# Patient Record
Sex: FEMALE | Race: WHITE | Employment: FULL TIME | URBAN - NONMETROPOLITAN AREA
[De-identification: names, ages, dates, MRNs, and addresses within clinical notes are randomized per-mention and may not be internally consistent; named-entity substitution may affect disease eponyms.]

---

## 2017-02-08 DIAGNOSIS — E11.69 TYPE 2 DIABETES MELLITUS WITH OTHER SPECIFIED COMPLICATION (HCC): ICD-10-CM

## 2017-02-08 LAB
ALBUMIN SERPL-MCNC: 3.6 G/DL (ref 3.5–5.2)
ALP BLD-CCNC: 55 U/L (ref 35–104)
ALT SERPL-CCNC: 11 U/L (ref 5–33)
ANION GAP SERPL CALCULATED.3IONS-SCNC: 14 MMOL/L (ref 7–19)
AST SERPL-CCNC: 14 U/L (ref 5–32)
BILIRUB SERPL-MCNC: <0.2 MG/DL (ref 0.2–1.2)
BUN BLDV-MCNC: 9 MG/DL (ref 6–20)
CALCIUM SERPL-MCNC: 8.9 MG/DL (ref 8.6–10)
CHLORIDE BLD-SCNC: 101 MMOL/L (ref 98–111)
CHOLESTEROL, TOTAL: 118 MG/DL (ref 160–199)
CO2: 26 MMOL/L (ref 22–29)
CREAT SERPL-MCNC: 0.7 MG/DL (ref 0.5–0.9)
GFR NON-AFRICAN AMERICAN: >60
GLOBULIN: 3.2 G/DL
GLUCOSE BLD-MCNC: 111 MG/DL (ref 74–109)
HBA1C MFR BLD: 5.9 %
HCT VFR BLD CALC: 39.9 % (ref 37–47)
HDLC SERPL-MCNC: 45 MG/DL (ref 65–121)
HEMOGLOBIN: 12.6 G/DL (ref 12–16)
LDL CHOLESTEROL CALCULATED: 58 MG/DL
MCH RBC QN AUTO: 26.6 PG (ref 27–31)
MCHC RBC AUTO-ENTMCNC: 31.6 G/DL (ref 33–37)
MCV RBC AUTO: 84.2 FL (ref 81–99)
MICROALBUMIN UR-MCNC: <1.2 MG/DL (ref 0–19)
PDW BLD-RTO: 13.1 % (ref 11.5–14.5)
PLATELET # BLD: 324 K/UL (ref 130–400)
PMV BLD AUTO: 9.9 FL (ref 7.4–10.4)
POTASSIUM SERPL-SCNC: 4.4 MMOL/L (ref 3.5–5)
RBC # BLD: 4.74 M/UL (ref 4.2–5.4)
SODIUM BLD-SCNC: 141 MMOL/L (ref 136–145)
TOTAL PROTEIN: 6.8 G/DL (ref 6.6–8.7)
TRIGL SERPL-MCNC: 77 MG/DL (ref 150–199)
WBC # BLD: 6.7 K/UL (ref 4.8–10.8)

## 2017-02-21 ENCOUNTER — OFFICE VISIT (OUTPATIENT)
Dept: FAMILY MEDICINE CLINIC | Age: 52
End: 2017-02-21
Payer: COMMERCIAL

## 2017-02-21 VITALS
TEMPERATURE: 98.9 F | BODY MASS INDEX: 33.86 KG/M2 | WEIGHT: 184 LBS | HEART RATE: 78 BPM | SYSTOLIC BLOOD PRESSURE: 126 MMHG | RESPIRATION RATE: 16 BRPM | HEIGHT: 62 IN | DIASTOLIC BLOOD PRESSURE: 72 MMHG | OXYGEN SATURATION: 98 %

## 2017-02-21 DIAGNOSIS — E66.9 OBESITY (BMI 30.0-34.9): ICD-10-CM

## 2017-02-21 DIAGNOSIS — E11.69 TYPE 2 DIABETES MELLITUS WITH OTHER SPECIFIED COMPLICATION (HCC): Primary | ICD-10-CM

## 2017-02-21 DIAGNOSIS — Z12.11 COLON CANCER SCREENING: ICD-10-CM

## 2017-02-21 PROCEDURE — 99214 OFFICE O/P EST MOD 30 MIN: CPT | Performed by: FAMILY MEDICINE

## 2017-02-21 RX ORDER — PHENTERMINE HYDROCHLORIDE 37.5 MG/1
37.5 TABLET ORAL
Qty: 30 TABLET | Refills: 0 | Status: SHIPPED | OUTPATIENT
Start: 2017-02-21 | End: 2017-03-20 | Stop reason: SDUPTHER

## 2017-02-21 ASSESSMENT — ENCOUNTER SYMPTOMS
RESPIRATORY NEGATIVE: 1
ALLERGIC/IMMUNOLOGIC NEGATIVE: 1
EYES NEGATIVE: 1
GASTROINTESTINAL NEGATIVE: 1

## 2017-03-06 ENCOUNTER — OFFICE VISIT (OUTPATIENT)
Dept: URGENT CARE | Age: 52
End: 2017-03-06
Payer: COMMERCIAL

## 2017-03-06 VITALS
TEMPERATURE: 98.9 F | RESPIRATION RATE: 20 BRPM | OXYGEN SATURATION: 97 % | DIASTOLIC BLOOD PRESSURE: 76 MMHG | WEIGHT: 176 LBS | SYSTOLIC BLOOD PRESSURE: 115 MMHG | BODY MASS INDEX: 32.39 KG/M2 | HEIGHT: 62 IN | HEART RATE: 72 BPM

## 2017-03-06 DIAGNOSIS — J04.0 LARYNGITIS: ICD-10-CM

## 2017-03-06 DIAGNOSIS — J06.9 VIRAL UPPER RESPIRATORY TRACT INFECTION: Primary | ICD-10-CM

## 2017-03-06 DIAGNOSIS — J02.9 SORE THROAT: ICD-10-CM

## 2017-03-06 LAB — S PYO AG THROAT QL: NORMAL

## 2017-03-06 PROCEDURE — 99213 OFFICE O/P EST LOW 20 MIN: CPT | Performed by: NURSE PRACTITIONER

## 2017-03-06 PROCEDURE — 96372 THER/PROPH/DIAG INJ SC/IM: CPT | Performed by: NURSE PRACTITIONER

## 2017-03-06 PROCEDURE — 87880 STREP A ASSAY W/OPTIC: CPT | Performed by: NURSE PRACTITIONER

## 2017-03-06 RX ORDER — DEXAMETHASONE SODIUM PHOSPHATE 100 MG/10ML
10 INJECTION INTRAMUSCULAR; INTRAVENOUS ONCE
Status: COMPLETED | OUTPATIENT
Start: 2017-03-06 | End: 2017-03-06

## 2017-03-06 RX ORDER — DEXTROMETHORPHAN HYDROBROMIDE AND PROMETHAZINE HYDROCHLORIDE 15; 6.25 MG/5ML; MG/5ML
SYRUP ORAL
Qty: 120 ML | Refills: 0 | Status: SHIPPED | OUTPATIENT
Start: 2017-03-06 | End: 2017-03-13

## 2017-03-06 RX ORDER — BENZONATATE 100 MG/1
100 CAPSULE ORAL 3 TIMES DAILY PRN
Qty: 21 CAPSULE | Refills: 0 | Status: SHIPPED | OUTPATIENT
Start: 2017-03-06 | End: 2017-03-13

## 2017-03-06 RX ADMIN — DEXAMETHASONE SODIUM PHOSPHATE 10 MG: 100 INJECTION INTRAMUSCULAR; INTRAVENOUS at 11:58

## 2017-03-06 ASSESSMENT — ENCOUNTER SYMPTOMS
COUGH: 1
VOICE CHANGE: 1
SINUS PRESSURE: 1
SORE THROAT: 1
GASTROINTESTINAL NEGATIVE: 1

## 2017-03-10 DIAGNOSIS — Z12.11 COLON CANCER SCREENING: ICD-10-CM

## 2017-03-10 DIAGNOSIS — R19.5 POSITIVE FIT (FECAL IMMUNOCHEMICAL TEST): Primary | ICD-10-CM

## 2017-03-10 LAB
CONTROL: NORMAL
HEMOCCULT STL QL: ABNORMAL

## 2017-03-10 PROCEDURE — 82274 ASSAY TEST FOR BLOOD FECAL: CPT | Performed by: FAMILY MEDICINE

## 2017-03-20 ENCOUNTER — OFFICE VISIT (OUTPATIENT)
Dept: FAMILY MEDICINE CLINIC | Age: 52
End: 2017-03-20
Payer: COMMERCIAL

## 2017-03-20 VITALS
HEIGHT: 62 IN | DIASTOLIC BLOOD PRESSURE: 82 MMHG | SYSTOLIC BLOOD PRESSURE: 122 MMHG | BODY MASS INDEX: 32.39 KG/M2 | TEMPERATURE: 98 F | OXYGEN SATURATION: 97 % | HEART RATE: 72 BPM | WEIGHT: 176 LBS | RESPIRATION RATE: 16 BRPM

## 2017-03-20 DIAGNOSIS — E66.9 OBESITY (BMI 30.0-34.9): Primary | ICD-10-CM

## 2017-03-20 PROCEDURE — 99213 OFFICE O/P EST LOW 20 MIN: CPT | Performed by: FAMILY MEDICINE

## 2017-03-20 RX ORDER — PHENTERMINE HYDROCHLORIDE 37.5 MG/1
37.5 TABLET ORAL
Qty: 30 TABLET | Refills: 0 | Status: SHIPPED | OUTPATIENT
Start: 2017-03-20 | End: 2017-04-21 | Stop reason: SDUPTHER

## 2017-03-20 ASSESSMENT — ENCOUNTER SYMPTOMS
EYES NEGATIVE: 1
RESPIRATORY NEGATIVE: 1
ALLERGIC/IMMUNOLOGIC NEGATIVE: 1
GASTROINTESTINAL NEGATIVE: 1

## 2017-04-04 ENCOUNTER — OFFICE VISIT (OUTPATIENT)
Dept: GASTROENTEROLOGY | Age: 52
End: 2017-04-04
Payer: COMMERCIAL

## 2017-04-04 VITALS
SYSTOLIC BLOOD PRESSURE: 120 MMHG | BODY MASS INDEX: 32.17 KG/M2 | WEIGHT: 174.8 LBS | DIASTOLIC BLOOD PRESSURE: 84 MMHG | HEART RATE: 114 BPM | HEIGHT: 62 IN | OXYGEN SATURATION: 98 %

## 2017-04-04 DIAGNOSIS — Z83.71 FAMILY HISTORY OF POLYPS IN THE COLON: ICD-10-CM

## 2017-04-04 DIAGNOSIS — R19.5 HEME POSITIVE STOOL: Primary | ICD-10-CM

## 2017-04-04 PROBLEM — Z83.719 FAMILY HISTORY OF POLYPS IN THE COLON: Status: ACTIVE | Noted: 2017-04-04

## 2017-04-04 PROCEDURE — 99214 OFFICE O/P EST MOD 30 MIN: CPT | Performed by: NURSE PRACTITIONER

## 2017-04-04 ASSESSMENT — ENCOUNTER SYMPTOMS
ABDOMINAL DISTENTION: 0
NAUSEA: 0
PHOTOPHOBIA: 0
BACK PAIN: 0
COUGH: 0
CONSTIPATION: 0
SORE THROAT: 0
WHEEZING: 0
ANAL BLEEDING: 0
VOMITING: 0
DIARRHEA: 0
CHOKING: 0
RECTAL PAIN: 0
BLOOD IN STOOL: 0

## 2017-04-10 ENCOUNTER — ANESTHESIA EVENT (OUTPATIENT)
Dept: OPERATING ROOM | Age: 52
End: 2017-04-10

## 2017-04-17 ENCOUNTER — HOSPITAL ENCOUNTER (OUTPATIENT)
Age: 52
Setting detail: SPECIMEN
Discharge: HOME | End: 2017-04-17
Payer: COMMERCIAL

## 2017-04-17 ENCOUNTER — HOSPITAL ENCOUNTER (OUTPATIENT)
Age: 52
Setting detail: OUTPATIENT SURGERY
Discharge: HOME | End: 2017-04-17
Attending: INTERNAL MEDICINE | Admitting: INTERNAL MEDICINE
Payer: COMMERCIAL

## 2017-04-17 ENCOUNTER — ANESTHESIA (OUTPATIENT)
Dept: OPERATING ROOM | Age: 52
End: 2017-04-17
Payer: COMMERCIAL

## 2017-04-17 VITALS
TEMPERATURE: 98.1 F | SYSTOLIC BLOOD PRESSURE: 120 MMHG | WEIGHT: 172 LBS | DIASTOLIC BLOOD PRESSURE: 74 MMHG | RESPIRATION RATE: 18 BRPM | HEART RATE: 74 BPM | HEIGHT: 62 IN | BODY MASS INDEX: 31.65 KG/M2 | OXYGEN SATURATION: 96 %

## 2017-04-17 VITALS — DIASTOLIC BLOOD PRESSURE: 78 MMHG | SYSTOLIC BLOOD PRESSURE: 131 MMHG | OXYGEN SATURATION: 99 %

## 2017-04-17 PROCEDURE — 45380 COLONOSCOPY AND BIOPSY: CPT | Performed by: INTERNAL MEDICINE

## 2017-04-17 PROCEDURE — 00810 PR ANESTH,INTESTINE,SCOPE,LOW: CPT | Performed by: NURSE ANESTHETIST, CERTIFIED REGISTERED

## 2017-04-17 PROCEDURE — G8907 PT DOC NO EVENTS ON DISCHARG: HCPCS

## 2017-04-17 PROCEDURE — G8918 PT W/O PREOP ORDER IV AB PRO: HCPCS

## 2017-04-17 PROCEDURE — 88305 TISSUE EXAM BY PATHOLOGIST: CPT

## 2017-04-17 PROCEDURE — 45380 COLONOSCOPY AND BIOPSY: CPT

## 2017-04-17 RX ORDER — HYDRALAZINE HYDROCHLORIDE 20 MG/ML
5 INJECTION INTRAMUSCULAR; INTRAVENOUS EVERY 10 MIN PRN
Status: DISCONTINUED | OUTPATIENT
Start: 2017-04-17 | End: 2017-04-17 | Stop reason: HOSPADM

## 2017-04-17 RX ORDER — SODIUM CHLORIDE 9 MG/ML
INJECTION, SOLUTION INTRAVENOUS CONTINUOUS
Status: DISCONTINUED | OUTPATIENT
Start: 2017-04-17 | End: 2017-04-17 | Stop reason: HOSPADM

## 2017-04-17 RX ORDER — PROPOFOL 10 MG/ML
INJECTION, EMULSION INTRAVENOUS PRN
Status: DISCONTINUED | OUTPATIENT
Start: 2017-04-17 | End: 2017-04-17 | Stop reason: SDUPTHER

## 2017-04-17 RX ORDER — PROMETHAZINE HYDROCHLORIDE 25 MG/ML
12.5 INJECTION, SOLUTION INTRAMUSCULAR; INTRAVENOUS
Status: DISCONTINUED | OUTPATIENT
Start: 2017-04-17 | End: 2017-04-17 | Stop reason: HOSPADM

## 2017-04-17 RX ORDER — MEPERIDINE HYDROCHLORIDE 25 MG/ML
12.5 INJECTION INTRAMUSCULAR; INTRAVENOUS; SUBCUTANEOUS EVERY 5 MIN PRN
Status: DISCONTINUED | OUTPATIENT
Start: 2017-04-17 | End: 2017-04-17 | Stop reason: HOSPADM

## 2017-04-17 RX ORDER — ONDANSETRON 2 MG/ML
4 INJECTION INTRAMUSCULAR; INTRAVENOUS
Status: DISCONTINUED | OUTPATIENT
Start: 2017-04-17 | End: 2017-04-17 | Stop reason: HOSPADM

## 2017-04-17 RX ORDER — DIPHENHYDRAMINE HYDROCHLORIDE 50 MG/ML
12.5 INJECTION INTRAMUSCULAR; INTRAVENOUS
Status: DISCONTINUED | OUTPATIENT
Start: 2017-04-17 | End: 2017-04-17 | Stop reason: HOSPADM

## 2017-04-17 RX ADMIN — PROPOFOL 200 MG: 10 INJECTION, EMULSION INTRAVENOUS at 11:29

## 2017-04-17 RX ADMIN — SODIUM CHLORIDE: 9 INJECTION, SOLUTION INTRAVENOUS at 10:49

## 2017-04-21 ENCOUNTER — OFFICE VISIT (OUTPATIENT)
Dept: FAMILY MEDICINE CLINIC | Age: 52
End: 2017-04-21
Payer: COMMERCIAL

## 2017-04-21 VITALS
BODY MASS INDEX: 31.09 KG/M2 | TEMPERATURE: 98 F | DIASTOLIC BLOOD PRESSURE: 82 MMHG | HEART RATE: 88 BPM | SYSTOLIC BLOOD PRESSURE: 122 MMHG | OXYGEN SATURATION: 98 % | WEIGHT: 170 LBS

## 2017-04-21 DIAGNOSIS — E66.9 OBESITY (BMI 30.0-34.9): ICD-10-CM

## 2017-04-21 PROCEDURE — 99213 OFFICE O/P EST LOW 20 MIN: CPT | Performed by: FAMILY MEDICINE

## 2017-04-21 RX ORDER — PHENTERMINE HYDROCHLORIDE 37.5 MG/1
37.5 TABLET ORAL
Qty: 30 TABLET | Refills: 0 | Status: SHIPPED | OUTPATIENT
Start: 2017-04-21 | End: 2017-05-31 | Stop reason: SDUPTHER

## 2017-04-21 ASSESSMENT — ENCOUNTER SYMPTOMS
RESPIRATORY NEGATIVE: 1
NAUSEA: 0
ALLERGIC/IMMUNOLOGIC NEGATIVE: 1
GASTROINTESTINAL NEGATIVE: 1
VOMITING: 0
EYES NEGATIVE: 1

## 2017-05-31 ENCOUNTER — OFFICE VISIT (OUTPATIENT)
Dept: FAMILY MEDICINE CLINIC | Age: 52
End: 2017-05-31
Payer: COMMERCIAL

## 2017-05-31 VITALS
WEIGHT: 162 LBS | DIASTOLIC BLOOD PRESSURE: 78 MMHG | OXYGEN SATURATION: 98 % | BODY MASS INDEX: 29.63 KG/M2 | SYSTOLIC BLOOD PRESSURE: 118 MMHG | TEMPERATURE: 98.5 F | HEART RATE: 77 BPM

## 2017-05-31 DIAGNOSIS — E66.9 OBESITY (BMI 30.0-34.9): ICD-10-CM

## 2017-05-31 PROCEDURE — 99213 OFFICE O/P EST LOW 20 MIN: CPT | Performed by: FAMILY MEDICINE

## 2017-05-31 RX ORDER — PHENTERMINE HYDROCHLORIDE 37.5 MG/1
37.5 TABLET ORAL
Qty: 30 TABLET | Refills: 0 | Status: SHIPPED | OUTPATIENT
Start: 2017-05-31 | End: 2017-06-30 | Stop reason: SDUPTHER

## 2017-05-31 ASSESSMENT — ENCOUNTER SYMPTOMS
EYES NEGATIVE: 1
RESPIRATORY NEGATIVE: 1
GASTROINTESTINAL NEGATIVE: 1
VOMITING: 0
NAUSEA: 0
ALLERGIC/IMMUNOLOGIC NEGATIVE: 1

## 2017-06-30 ENCOUNTER — OFFICE VISIT (OUTPATIENT)
Dept: FAMILY MEDICINE CLINIC | Age: 52
End: 2017-06-30
Payer: COMMERCIAL

## 2017-06-30 VITALS
SYSTOLIC BLOOD PRESSURE: 122 MMHG | TEMPERATURE: 97 F | HEART RATE: 76 BPM | WEIGHT: 158 LBS | DIASTOLIC BLOOD PRESSURE: 80 MMHG | BODY MASS INDEX: 28.9 KG/M2 | OXYGEN SATURATION: 93 %

## 2017-06-30 DIAGNOSIS — E66.09 OBESITY DUE TO EXCESS CALORIES, UNSPECIFIED OBESITY SEVERITY: Primary | ICD-10-CM

## 2017-06-30 PROCEDURE — 99213 OFFICE O/P EST LOW 20 MIN: CPT | Performed by: FAMILY MEDICINE

## 2017-06-30 RX ORDER — PHENTERMINE HYDROCHLORIDE 37.5 MG/1
37.5 TABLET ORAL
Qty: 30 TABLET | Refills: 0 | Status: SHIPPED | OUTPATIENT
Start: 2017-06-30 | End: 2017-07-30

## 2017-06-30 ASSESSMENT — ENCOUNTER SYMPTOMS
ABDOMINAL PAIN: 0
NAUSEA: 0
SHORTNESS OF BREATH: 0
ALLERGIC/IMMUNOLOGIC NEGATIVE: 1
RESPIRATORY NEGATIVE: 1
GASTROINTESTINAL NEGATIVE: 1
VOMITING: 0
EYES NEGATIVE: 1

## 2017-10-23 DIAGNOSIS — E78.5 HYPERLIPIDEMIA, UNSPECIFIED HYPERLIPIDEMIA TYPE: Primary | ICD-10-CM

## 2017-10-23 DIAGNOSIS — I10 ESSENTIAL HYPERTENSION: ICD-10-CM

## 2017-10-23 DIAGNOSIS — E03.9 HYPOTHYROIDISM, UNSPECIFIED TYPE: ICD-10-CM

## 2017-10-23 DIAGNOSIS — E11.69 TYPE 2 DIABETES MELLITUS WITH OTHER SPECIFIED COMPLICATION, WITHOUT LONG-TERM CURRENT USE OF INSULIN (HCC): ICD-10-CM

## 2017-10-23 DIAGNOSIS — E78.5 HYPERLIPIDEMIA, UNSPECIFIED HYPERLIPIDEMIA TYPE: ICD-10-CM

## 2017-10-23 LAB
ALBUMIN SERPL-MCNC: 4.1 G/DL (ref 3.5–5.2)
ALP BLD-CCNC: 51 U/L (ref 35–104)
ALT SERPL-CCNC: 25 U/L (ref 5–33)
ANION GAP SERPL CALCULATED.3IONS-SCNC: 14 MMOL/L (ref 7–19)
AST SERPL-CCNC: 22 U/L (ref 5–32)
BILIRUB SERPL-MCNC: 0.4 MG/DL (ref 0.2–1.2)
BILIRUBIN URINE: NEGATIVE
BLOOD, URINE: NEGATIVE
BUN BLDV-MCNC: 15 MG/DL (ref 6–20)
CALCIUM SERPL-MCNC: 9.2 MG/DL (ref 8.6–10)
CHLORIDE BLD-SCNC: 99 MMOL/L (ref 98–111)
CHOLESTEROL, TOTAL: 166 MG/DL (ref 160–199)
CLARITY: CLEAR
CO2: 30 MMOL/L (ref 22–29)
COLOR: NORMAL
CREAT SERPL-MCNC: 0.9 MG/DL (ref 0.5–0.9)
GFR NON-AFRICAN AMERICAN: >60
GLUCOSE BLD-MCNC: 93 MG/DL (ref 74–109)
GLUCOSE URINE: NEGATIVE MG/DL
HBA1C MFR BLD: 5.6 %
HCT VFR BLD CALC: 42.9 % (ref 37–47)
HDLC SERPL-MCNC: 47 MG/DL (ref 65–121)
HEMOGLOBIN: 13.3 G/DL (ref 12–16)
KETONES, URINE: NEGATIVE MG/DL
LDL CHOLESTEROL CALCULATED: 100 MG/DL
LEUKOCYTE ESTERASE, URINE: NEGATIVE
MCH RBC QN AUTO: 26.8 PG (ref 27–31)
MCHC RBC AUTO-ENTMCNC: 31 G/DL (ref 33–37)
MCV RBC AUTO: 86.3 FL (ref 81–99)
NITRITE, URINE: NEGATIVE
PDW BLD-RTO: 12.5 % (ref 11.5–14.5)
PH UA: 6
PLATELET # BLD: 255 K/UL (ref 130–400)
PMV BLD AUTO: 10 FL (ref 9.4–12.3)
POTASSIUM SERPL-SCNC: 3.9 MMOL/L (ref 3.5–5)
PROTEIN UA: NEGATIVE MG/DL
RBC # BLD: 4.97 M/UL (ref 4.2–5.4)
SODIUM BLD-SCNC: 143 MMOL/L (ref 136–145)
SPECIFIC GRAVITY UA: 1.01
TOTAL PROTEIN: 7.5 G/DL (ref 6.6–8.7)
TRIGL SERPL-MCNC: 94 MG/DL (ref 0–149)
TSH SERPL DL<=0.05 MIU/L-ACNC: 9.02 UIU/ML (ref 0.27–4.2)
UROBILINOGEN, URINE: 0.2 E.U./DL
WBC # BLD: 5.7 K/UL (ref 4.8–10.8)

## 2017-10-24 ENCOUNTER — TELEPHONE (OUTPATIENT)
Dept: FAMILY MEDICINE CLINIC | Age: 52
End: 2017-10-24

## 2017-10-24 LAB
CREATININE URINE: 38.6 MG/DL (ref 4.2–622)
MICROALBUMIN UR-MCNC: <1.2 MG/DL (ref 0–19)
MICROALBUMIN/CREAT UR-RTO: NORMAL MG/G

## 2017-10-24 RX ORDER — LEVOTHYROXINE SODIUM 0.12 MG/1
125 TABLET ORAL DAILY
Qty: 30 TABLET | Refills: 3 | Status: SHIPPED | OUTPATIENT
Start: 2017-10-24 | End: 2017-10-31 | Stop reason: SDUPTHER

## 2017-10-31 ENCOUNTER — OFFICE VISIT (OUTPATIENT)
Dept: FAMILY MEDICINE CLINIC | Age: 52
End: 2017-10-31
Payer: COMMERCIAL

## 2017-10-31 VITALS
TEMPERATURE: 98.2 F | SYSTOLIC BLOOD PRESSURE: 118 MMHG | OXYGEN SATURATION: 98 % | BODY MASS INDEX: 29.63 KG/M2 | HEIGHT: 62 IN | HEART RATE: 88 BPM | RESPIRATION RATE: 16 BRPM | DIASTOLIC BLOOD PRESSURE: 82 MMHG | WEIGHT: 161 LBS

## 2017-10-31 DIAGNOSIS — E78.5 HYPERLIPIDEMIA, UNSPECIFIED HYPERLIPIDEMIA TYPE: ICD-10-CM

## 2017-10-31 DIAGNOSIS — E03.9 HYPOTHYROIDISM, UNSPECIFIED TYPE: ICD-10-CM

## 2017-10-31 DIAGNOSIS — I10 ESSENTIAL HYPERTENSION: ICD-10-CM

## 2017-10-31 DIAGNOSIS — E11.69 TYPE 2 DIABETES MELLITUS WITH OTHER SPECIFIED COMPLICATION, WITHOUT LONG-TERM CURRENT USE OF INSULIN (HCC): Primary | ICD-10-CM

## 2017-10-31 PROCEDURE — 99214 OFFICE O/P EST MOD 30 MIN: CPT | Performed by: FAMILY MEDICINE

## 2017-10-31 RX ORDER — ATORVASTATIN CALCIUM 20 MG/1
TABLET, FILM COATED ORAL
Qty: 90 TABLET | Refills: 3 | Status: SHIPPED | OUTPATIENT
Start: 2017-10-31 | End: 2018-08-07 | Stop reason: SDUPTHER

## 2017-10-31 RX ORDER — LOSARTAN POTASSIUM AND HYDROCHLOROTHIAZIDE 12.5; 5 MG/1; MG/1
TABLET ORAL
Qty: 90 TABLET | Refills: 3 | Status: SHIPPED | OUTPATIENT
Start: 2017-10-31 | End: 2018-08-07 | Stop reason: SDUPTHER

## 2017-10-31 RX ORDER — LEVOTHYROXINE SODIUM 0.12 MG/1
125 TABLET ORAL DAILY
Qty: 90 TABLET | Refills: 3 | Status: SHIPPED | OUTPATIENT
Start: 2017-10-31 | End: 2018-08-07 | Stop reason: SDUPTHER

## 2017-10-31 ASSESSMENT — ENCOUNTER SYMPTOMS
ALLERGIC/IMMUNOLOGIC NEGATIVE: 1
EYES NEGATIVE: 1
RESPIRATORY NEGATIVE: 1
GASTROINTESTINAL NEGATIVE: 1

## 2017-10-31 NOTE — PROGRESS NOTES
Mouth/Throat: Oropharynx is clear and moist.   Eyes: Conjunctivae and EOM are normal. Pupils are equal, round, and reactive to light. Neck: Normal range of motion. Neck supple. Cardiovascular: Normal rate, regular rhythm, normal heart sounds and intact distal pulses. Pulmonary/Chest: Effort normal and breath sounds normal.   Abdominal: Soft. Bowel sounds are normal.   Musculoskeletal: Normal range of motion. Neurological: She is alert and oriented to person, place, and time. She has normal reflexes. Skin: Skin is warm and dry. Psychiatric: She has a normal mood and affect. Her behavior is normal. Judgment and thought content normal.   Vitals reviewed. Assessment:       1. Type 2 diabetes mellitus with other specified complication, without long-term current use of insulin (HCC) Well control  SITagliptin (JANUVIA) 100 MG tablet    CBC    Comprehensive Metabolic Panel    Lipid Panel    Hemoglobin A1C    MICROALBUMIN, RANDOM URINE (W/O CREATININE)    Urinalysis   2. Essential hypertension Well control  losartan-hydrochlorothiazide (HYZAAR) 50-12.5 MG per tablet    metoprolol tartrate (LOPRESSOR) 25 MG tablet   3. Hyperlipidemia, unspecified hyperlipidemia type Well control  atorvastatin (LIPITOR) 20 MG tablet   4. Hypothyroidism, unspecified type Not control  levothyroxine (SYNTHROID) 125 MCG tablet             Plan: 1. Continue medication. Blood work in 6 months. Encouraged diet and exercise and medication compliant. 2. Continue medication. 3. Continue medication. 4. Increase Synthroid to next dose. We'll do blood work next time she comes.  Follow-up 6 months

## 2018-08-03 DIAGNOSIS — E11.69 TYPE 2 DIABETES MELLITUS WITH OTHER SPECIFIED COMPLICATION, WITHOUT LONG-TERM CURRENT USE OF INSULIN (HCC): ICD-10-CM

## 2018-08-03 LAB
ALBUMIN SERPL-MCNC: 4.3 G/DL (ref 3.5–5.2)
ALP BLD-CCNC: 50 U/L (ref 35–104)
ALT SERPL-CCNC: 27 U/L (ref 5–33)
ANION GAP SERPL CALCULATED.3IONS-SCNC: 12 MMOL/L (ref 7–19)
AST SERPL-CCNC: 23 U/L (ref 5–32)
BILIRUB SERPL-MCNC: 0.6 MG/DL (ref 0.2–1.2)
BILIRUBIN URINE: NEGATIVE
BLOOD, URINE: NEGATIVE
BUN BLDV-MCNC: 9 MG/DL (ref 6–20)
CALCIUM SERPL-MCNC: 9.4 MG/DL (ref 8.6–10)
CHLORIDE BLD-SCNC: 98 MMOL/L (ref 98–111)
CHOLESTEROL, TOTAL: 129 MG/DL (ref 160–199)
CLARITY: CLEAR
CO2: 29 MMOL/L (ref 22–29)
COLOR: YELLOW
CREAT SERPL-MCNC: 0.7 MG/DL (ref 0.5–0.9)
GFR NON-AFRICAN AMERICAN: >60
GLUCOSE BLD-MCNC: 98 MG/DL (ref 74–109)
GLUCOSE URINE: NEGATIVE MG/DL
HBA1C MFR BLD: 5.9 % (ref 4–6)
HCT VFR BLD CALC: 40.3 % (ref 37–47)
HDLC SERPL-MCNC: 54 MG/DL (ref 65–121)
HEMOGLOBIN: 12.7 G/DL (ref 12–16)
KETONES, URINE: NEGATIVE MG/DL
LDL CHOLESTEROL CALCULATED: 67 MG/DL
LEUKOCYTE ESTERASE, URINE: NEGATIVE
MCH RBC QN AUTO: 26.9 PG (ref 27–31)
MCHC RBC AUTO-ENTMCNC: 31.5 G/DL (ref 33–37)
MCV RBC AUTO: 85.4 FL (ref 81–99)
MICROALBUMIN UR-MCNC: <1.2 MG/DL (ref 0–19)
NITRITE, URINE: NEGATIVE
PDW BLD-RTO: 12.6 % (ref 11.5–14.5)
PH UA: 7.5
PLATELET # BLD: 243 K/UL (ref 130–400)
PMV BLD AUTO: 10.1 FL (ref 9.4–12.3)
POTASSIUM SERPL-SCNC: 3.9 MMOL/L (ref 3.5–5)
PROTEIN UA: NEGATIVE MG/DL
RBC # BLD: 4.72 M/UL (ref 4.2–5.4)
SODIUM BLD-SCNC: 139 MMOL/L (ref 136–145)
SPECIFIC GRAVITY UA: 1.02
TOTAL PROTEIN: 7.6 G/DL (ref 6.6–8.7)
TRIGL SERPL-MCNC: 42 MG/DL (ref 0–149)
UROBILINOGEN, URINE: 0.2 E.U./DL
WBC # BLD: 5.8 K/UL (ref 4.8–10.8)

## 2018-08-07 ENCOUNTER — OFFICE VISIT (OUTPATIENT)
Dept: FAMILY MEDICINE CLINIC | Age: 53
End: 2018-08-07
Payer: COMMERCIAL

## 2018-08-07 VITALS
HEART RATE: 92 BPM | WEIGHT: 178 LBS | OXYGEN SATURATION: 96 % | DIASTOLIC BLOOD PRESSURE: 84 MMHG | BODY MASS INDEX: 32.76 KG/M2 | TEMPERATURE: 98.2 F | SYSTOLIC BLOOD PRESSURE: 138 MMHG | HEIGHT: 62 IN | RESPIRATION RATE: 16 BRPM

## 2018-08-07 DIAGNOSIS — E78.5 HYPERLIPIDEMIA, UNSPECIFIED HYPERLIPIDEMIA TYPE: ICD-10-CM

## 2018-08-07 DIAGNOSIS — Z12.31 SCREENING MAMMOGRAM, ENCOUNTER FOR: ICD-10-CM

## 2018-08-07 DIAGNOSIS — R42 VERTIGO: ICD-10-CM

## 2018-08-07 DIAGNOSIS — I10 ESSENTIAL HYPERTENSION: ICD-10-CM

## 2018-08-07 DIAGNOSIS — E03.9 HYPOTHYROIDISM, UNSPECIFIED TYPE: ICD-10-CM

## 2018-08-07 DIAGNOSIS — E11.69 TYPE 2 DIABETES MELLITUS WITH OTHER SPECIFIED COMPLICATION, WITHOUT LONG-TERM CURRENT USE OF INSULIN (HCC): Primary | ICD-10-CM

## 2018-08-07 PROCEDURE — 99214 OFFICE O/P EST MOD 30 MIN: CPT | Performed by: FAMILY MEDICINE

## 2018-08-07 RX ORDER — ATORVASTATIN CALCIUM 20 MG/1
TABLET, FILM COATED ORAL
Qty: 90 TABLET | Refills: 3 | Status: SHIPPED | OUTPATIENT
Start: 2018-08-07 | End: 2019-06-05 | Stop reason: SDUPTHER

## 2018-08-07 RX ORDER — LEVOTHYROXINE SODIUM 0.12 MG/1
125 TABLET ORAL DAILY
Qty: 90 TABLET | Refills: 3 | Status: SHIPPED | OUTPATIENT
Start: 2018-08-07 | End: 2019-05-30

## 2018-08-07 RX ORDER — LOSARTAN POTASSIUM AND HYDROCHLOROTHIAZIDE 12.5; 5 MG/1; MG/1
TABLET ORAL
Qty: 90 TABLET | Refills: 3 | Status: SHIPPED | OUTPATIENT
Start: 2018-08-07 | End: 2019-06-05 | Stop reason: SDUPTHER

## 2018-08-07 RX ORDER — MECLIZINE HYDROCHLORIDE 25 MG/1
25 TABLET ORAL 3 TIMES DAILY PRN
Qty: 20 TABLET | Refills: 1 | Status: SHIPPED | OUTPATIENT
Start: 2018-08-07 | End: 2018-08-17

## 2018-08-07 ASSESSMENT — PATIENT HEALTH QUESTIONNAIRE - PHQ9
2. FEELING DOWN, DEPRESSED OR HOPELESS: 1
SUM OF ALL RESPONSES TO PHQ QUESTIONS 1-9: 1
SUM OF ALL RESPONSES TO PHQ9 QUESTIONS 1 & 2: 1
1. LITTLE INTEREST OR PLEASURE IN DOING THINGS: 0
SUM OF ALL RESPONSES TO PHQ QUESTIONS 1-9: 1

## 2018-08-07 ASSESSMENT — ENCOUNTER SYMPTOMS
GASTROINTESTINAL NEGATIVE: 1
RESPIRATORY NEGATIVE: 1
EYES NEGATIVE: 1
ALLERGIC/IMMUNOLOGIC NEGATIVE: 1

## 2018-08-07 NOTE — PROGRESS NOTES
SUBJECTIVE:    Patient ID: Ramiro Mccullough is a 45 y.o. female. HPI:   Patient here for follow-up of multiple medical problems. Diabetes  Patient is diabetic. She takes Januvia for this problem. She had gained some weight back. Her A1c is well-controlled. Denies hypoglycemia. She is compliant with medication. She's been under more stress as she had gained some weight. She tries to stay active. Hypertension  Patient take hypertension medications. Her blood pressure is well-controlled. Denies medication side effects. She is compliant with medication. She stays active. She had gained some weight. Hyperlipidemia  Patient have hyperlipidemia with diabetes and hypertension. She take statin drugs. Her cholesterol is well-controlled. Denies medication side effects. She is compliant with medicine. Cholesterol is well control. Hypothyroidism  Patient takes Synthroid. She is compliant with medication. She is due for blood work. Denies symptoms of hypothyroidism. Requests a refill of medications today. Vertigo  Patient have history of recurrent vertigo. She takes Dramamine over-the-counter for this problem. She have a layered this problem couple days ago. She been having these on off for the last 10+ years.   Health maintenance  Patient is due for mammogram.         Past Medical History:   Diagnosis Date    Benign essential tremor     Diabetes mellitus type II     HTN (hypertension)     Hyperlipidemia     Hypothyroidism     Syncope     Type II or unspecified type diabetes mellitus without mention of complication, not stated as uncontrolled       Current Outpatient Prescriptions   Medication Sig Dispense Refill    levothyroxine (SYNTHROID) 125 MCG tablet Take 1 tablet by mouth Daily 90 tablet 3    losartan-hydrochlorothiazide (HYZAAR) 50-12.5 MG per tablet TAKE 1 TABLET DAILY 90 tablet 3    atorvastatin (LIPITOR) 20 MG tablet TAKE 1 TABLET DAILY 90 tablet 3    metoprolol tartrate (LOPRESSOR) 25 MG tablet TAKE 1 TABLET TWICE A  tablet 3    SITagliptin (JANUVIA) 100 MG tablet Take 1 tablet by mouth daily 90 tablet 3    meclizine (ANTIVERT) 25 MG tablet Take 1 tablet by mouth 3 times daily as needed for Dizziness 20 tablet 1    promethazine (PHENERGAN) 12.5 MG tablet Take 1 tablet by mouth every 4 hours as needed for Nausea 60 tablet 0    Ginkgo Biloba (GNP GINGKO BILOBA EXTRACT PO) Take 120 mg by mouth daily.  vitamin B-12 (CYANOCOBALAMIN) 500 MCG tablet Take 500 mcg by mouth daily.  aspirin 81 MG EC tablet Take 81 mg by mouth daily. No current facility-administered medications for this visit. Allergies   Allergen Reactions    Codeine     Darvocet [Propoxyphene N-Acetaminophen]     Inderal [Propranolol Hcl]     Morphine Rash       Review of Systems   Constitutional: Negative. HENT: Negative. Eyes: Negative. Respiratory: Negative. Cardiovascular: Negative. Gastrointestinal: Negative. Endocrine: Negative. Genitourinary: Negative. Musculoskeletal: Negative. Skin: Negative. Allergic/Immunologic: Negative. Neurological: Positive for dizziness. Hematological: Negative. Psychiatric/Behavioral: Negative. OBJECTIVE:    Physical Exam   Constitutional: She is oriented to person, place, and time. She appears well-developed and well-nourished. HENT:   Head: Normocephalic and atraumatic. Right Ear: External ear normal.   Left Ear: External ear normal.   Nose: Nose normal.   Mouth/Throat: Oropharynx is clear and moist.   Eyes: Conjunctivae and EOM are normal. Pupils are equal, round, and reactive to light. Neck: Normal range of motion. Neck supple. Cardiovascular: Normal rate, regular rhythm, normal heart sounds and intact distal pulses. Pulmonary/Chest: Effort normal and breath sounds normal.   Abdominal: Soft. Bowel sounds are normal.   Musculoskeletal: Normal range of motion.    Neurological: She is alert and oriented to person, place, and

## 2018-08-07 NOTE — PATIENT INSTRUCTIONS
Patient Education        Epley Maneuver at Home for Vertigo: Exercises  Your Care Instructions  Vertigo is a spinning or whirling sensation when you move your head. Your doctor may have moved you in different positions to help your vertigo get better faster. This is called the Epley maneuver. Your doctor also may have asked you to do these exercises at home. Do the exercises as often as your doctor recommends. If your vertigo is getting worse, your doctor may have you change the exercise or stop it. Step 1  Step 1    1. Sit on the edge of a bed or sofa. Step 2    1. Turn your head 45 degrees in the direction your doctor told you to. This should be toward the ear that causes the most vertigo for you. In this picture, the woman is turning toward her left ear. Step 3    1. Tilt yourself backward until you are lying on your back. Your head should still be at a 45-degree turn. Your head should be about midway between looking straight ahead and looking out to your side. Hold for 30 seconds. If you have vertigo, stay in this position until it stops. Step 4    1. Turn your head 90 degrees toward the ear that has the least vertigo. In this picture, the woman is turning to the right because she has vertigo on her left side. The point of your chin should be raised and over your shoulder. Hold for 30 seconds. Step 5    1. Roll onto the side with the least vertigo. You should now be looking at the floor. Hold for 30 seconds. Follow-up care is a key part of your treatment and safety. Be sure to make and go to all appointments, and call your doctor if you are having problems. It's also a good idea to know your test results and keep a list of the medicines you take. Where can you learn more? Go to https://chpejobeb.[email or web address] org and sign in to your [employer] account. Enter Y937 in the Roambi box to learn more about \"Epley Maneuver at Home for Vertigo: Exercises. \"     If you do not have an you are looking to your right, you will roll onto your right side.) The side that causes the worst symptoms should be facing up. You'll stay in this position for another 30 seconds or until your symptoms stop. Step 4    2. The doctor or physical therapist will then help you to sit back up. Your legs will hang off the table on the same side that you were facing. Follow-up care is a key part of your treatment and safety. Be sure to make and go to all appointments, and call your doctor if you are having problems. It's also a good idea to know your test results and keep a list of the medicines you take. Where can you learn more? Go to https://TripbodpeContixeweb.[email or web address] org and sign in to your [employer] account. Enter V467 in the [employer] box to learn more about \"Epley Maneuver for Vertigo: Exercises. \"     If you do not have an account, please click on the \"Sign Up Now\" link. Current as of: May 12, 2017  Content Version: 11.6  © 9360-4281 [employer]. Care instructions adapted under license by [facility] ([facility]). If you have questions about a medical condition or this instruction, always ask your healthcare professional. [place] [address] any warranty or liability for your use of this information. Patient Education        [place] Exercises for Vertigo: Care Instructions  Your Care Instructions  Simple exercises can help you regain your balance when you have vertigo. If you have Ménière's disease, benign paroxysmal positional vertigo (BPPV), or another inner ear problem, you may have vertigo off and on. Do these exercises first thing in the morning and before you go to bed. You might get dizzy when you first start them. If this happens, try to do them for at least 5 minutes. Do a group of exercises at a time, starting at the top of the list. It may take several weeks before you can do all the exercises without feeling dizzy.   Follow-up care is a key part of your treatment and safety. Be sure to make and go to all appointments, and call your doctor if you are having problems. It's also a good idea to know your test results and keep a list of the medicines you take. How can you care for yourself at home? Exercise 1  While sitting on the side of the bed and holding your head still:  · Look up as far as you can. · Look down as far as you can. · Look from side to side as far as you can. · Stretch your arm straight out in front of you. Focus on your index finger. Continue to focus on your finger while you bring it to your nose. Exercise 2  While sitting on the side of the bed:  · Bring your head as far back as you can. · Bring your head forward to touch your chin to your chest.  · Turn your head from side to side. · Do these exercises first with your eyes open. Then try with your eyes closed. Exercise 3  While sitting on the side of the bed:  · Shrug your shoulders straight upward, then relax them. · Bend over and try to touch the ground with your fingers. Then go back to a sitting position. · Toss a small ball from one hand to the other. Throw the ball higher than your eyes so you have to look up. Exercise 4  While standing (with someone close by if you feel uncomfortable):  · Repeat Exercise 1.  · Repeat Exercise 2.  · Pass a ball between your legs and above your head. · Sit down and then stand up. Repeat. Turn around in a [place] a different way each time you stand. · With someone close by to help you, try the above exercises with your eyes closed. Exercise 5  In a room that is cleared of obstacles:  · Walk to a corner of the room, turn to your right, and walk back to the starting point. Now, repeat and turn left. · Walk up and down a slope. Now try stairs. · While holding on to someone's arm, try these exercises with your eyes closed. When should you call for help?   Watch closely for changes in your health, and be sure to contact your doctor if:    · You do

## 2018-08-07 NOTE — LETTER
[address] [id number]  Phone: [phone]  Fax: [phone]    Zafar Deutsch MD        August 7, 2018     Patient: Harry Olivares   YOB: 1965   Date of Visit: 8/7/2018       To Whom it May Concern:    Abdiel Edmonds was seen in my clinic on 8/7/2018. She may return to work on 8/8/18. Please excuse her for 8/6/18 and 8/7/18. .    If you have any questions or concerns, please don't hesitate to call.     Sincerely,         Zafar Deutsch MD

## 2018-08-13 DIAGNOSIS — Z12.31 BREAST CANCER SCREENING BY MAMMOGRAM: Primary | ICD-10-CM

## 2018-10-24 ENCOUNTER — OFFICE VISIT (OUTPATIENT)
Dept: FAMILY MEDICINE CLINIC | Age: 53
End: 2018-10-24
Payer: COMMERCIAL

## 2018-10-24 VITALS
BODY MASS INDEX: 34.2 KG/M2 | TEMPERATURE: 98.5 F | DIASTOLIC BLOOD PRESSURE: 70 MMHG | SYSTOLIC BLOOD PRESSURE: 122 MMHG | HEART RATE: 70 BPM | WEIGHT: 187 LBS | OXYGEN SATURATION: 98 %

## 2018-10-24 DIAGNOSIS — F41.8 DEPRESSION WITH ANXIETY: Primary | ICD-10-CM

## 2018-10-24 DIAGNOSIS — Z23 NEED FOR INFLUENZA VACCINATION: ICD-10-CM

## 2018-10-24 PROCEDURE — 99213 OFFICE O/P EST LOW 20 MIN: CPT | Performed by: FAMILY MEDICINE

## 2018-10-24 PROCEDURE — 90682 RIV4 VACC RECOMBINANT DNA IM: CPT | Performed by: FAMILY MEDICINE

## 2018-10-24 PROCEDURE — 90471 IMMUNIZATION ADMIN: CPT | Performed by: FAMILY MEDICINE

## 2018-10-24 RX ORDER — ESCITALOPRAM OXALATE 5 MG/1
5 TABLET ORAL DAILY
Qty: 30 TABLET | Refills: 5 | Status: SHIPPED | OUTPATIENT
Start: 2018-10-24 | End: 2019-02-06 | Stop reason: SDUPTHER

## 2018-10-24 ASSESSMENT — ENCOUNTER SYMPTOMS
EYES NEGATIVE: 1
RESPIRATORY NEGATIVE: 1
GASTROINTESTINAL NEGATIVE: 1
ALLERGIC/IMMUNOLOGIC NEGATIVE: 1

## 2019-02-06 DIAGNOSIS — F41.8 DEPRESSION WITH ANXIETY: ICD-10-CM

## 2019-02-06 RX ORDER — ESCITALOPRAM OXALATE 5 MG/1
5 TABLET ORAL DAILY
Qty: 90 TABLET | Refills: 1 | Status: SHIPPED | OUTPATIENT
Start: 2019-02-06 | End: 2019-06-05 | Stop reason: SDUPTHER

## 2019-05-23 DIAGNOSIS — E03.9 HYPOTHYROIDISM, UNSPECIFIED TYPE: ICD-10-CM

## 2019-05-23 DIAGNOSIS — E11.69 TYPE 2 DIABETES MELLITUS WITH OTHER SPECIFIED COMPLICATION, WITHOUT LONG-TERM CURRENT USE OF INSULIN (HCC): ICD-10-CM

## 2019-05-23 LAB
ALBUMIN SERPL-MCNC: 4.2 G/DL (ref 3.5–5.2)
ALP BLD-CCNC: 55 U/L (ref 35–104)
ALT SERPL-CCNC: 21 U/L (ref 5–33)
ANION GAP SERPL CALCULATED.3IONS-SCNC: 16 MMOL/L (ref 7–19)
AST SERPL-CCNC: 22 U/L (ref 5–32)
BACTERIA: NEGATIVE /HPF
BILIRUB SERPL-MCNC: 0.3 MG/DL (ref 0.2–1.2)
BILIRUBIN URINE: NEGATIVE
BLOOD, URINE: NEGATIVE
BUN BLDV-MCNC: 13 MG/DL (ref 6–20)
CALCIUM SERPL-MCNC: 9.3 MG/DL (ref 8.6–10)
CHLORIDE BLD-SCNC: 104 MMOL/L (ref 98–111)
CHOLESTEROL, TOTAL: 121 MG/DL (ref 160–199)
CLARITY: ABNORMAL
CO2: 26 MMOL/L (ref 22–29)
COLOR: YELLOW
CREAT SERPL-MCNC: 0.9 MG/DL (ref 0.5–0.9)
EPITHELIAL CELLS, UA: 3 /HPF (ref 0–5)
GFR NON-AFRICAN AMERICAN: >60
GLUCOSE BLD-MCNC: 102 MG/DL (ref 74–109)
GLUCOSE URINE: NEGATIVE MG/DL
HBA1C MFR BLD: 6 % (ref 4–6)
HCT VFR BLD CALC: 42.9 % (ref 37–47)
HDLC SERPL-MCNC: 38 MG/DL (ref 65–121)
HEMOGLOBIN: 13.1 G/DL (ref 12–16)
HYALINE CASTS: 4 /HPF (ref 0–8)
KETONES, URINE: NEGATIVE MG/DL
LDL CHOLESTEROL CALCULATED: 69 MG/DL
LEUKOCYTE ESTERASE, URINE: ABNORMAL
MCH RBC QN AUTO: 26.1 PG (ref 27–31)
MCHC RBC AUTO-ENTMCNC: 30.5 G/DL (ref 33–37)
MCV RBC AUTO: 85.5 FL (ref 81–99)
MICROALBUMIN UR-MCNC: 1.8 MG/DL (ref 0–19)
NITRITE, URINE: NEGATIVE
PDW BLD-RTO: 12.9 % (ref 11.5–14.5)
PH UA: 5.5 (ref 5–8)
PLATELET # BLD: 222 K/UL (ref 130–400)
PMV BLD AUTO: 10.8 FL (ref 9.4–12.3)
POTASSIUM SERPL-SCNC: 4 MMOL/L (ref 3.5–5)
PROTEIN UA: ABNORMAL MG/DL
RBC # BLD: 5.02 M/UL (ref 4.2–5.4)
RBC UA: 3 /HPF (ref 0–4)
SODIUM BLD-SCNC: 146 MMOL/L (ref 136–145)
SPECIFIC GRAVITY UA: 1.03 (ref 1–1.03)
TOTAL PROTEIN: 7.3 G/DL (ref 6.6–8.7)
TRIGL SERPL-MCNC: 71 MG/DL (ref 0–149)
TSH SERPL DL<=0.05 MIU/L-ACNC: 4.52 UIU/ML (ref 0.27–4.2)
UROBILINOGEN, URINE: 0.2 E.U./DL
WBC # BLD: 5.6 K/UL (ref 4.8–10.8)
WBC UA: 15 /HPF (ref 0–5)

## 2019-05-30 DIAGNOSIS — E03.9 HYPOTHYROIDISM, UNSPECIFIED TYPE: ICD-10-CM

## 2019-05-30 RX ORDER — LEVOTHYROXINE SODIUM 137 UG/1
137 TABLET ORAL DAILY
Qty: 90 TABLET | Refills: 1 | Status: SHIPPED | OUTPATIENT
Start: 2019-05-30 | End: 2019-06-05 | Stop reason: SDUPTHER

## 2019-06-05 ENCOUNTER — OFFICE VISIT (OUTPATIENT)
Dept: FAMILY MEDICINE CLINIC | Age: 54
End: 2019-06-05
Payer: COMMERCIAL

## 2019-06-05 VITALS
WEIGHT: 184 LBS | DIASTOLIC BLOOD PRESSURE: 72 MMHG | HEART RATE: 65 BPM | SYSTOLIC BLOOD PRESSURE: 128 MMHG | OXYGEN SATURATION: 98 % | BODY MASS INDEX: 33.65 KG/M2 | TEMPERATURE: 97.2 F

## 2019-06-05 DIAGNOSIS — I10 ESSENTIAL HYPERTENSION: ICD-10-CM

## 2019-06-05 DIAGNOSIS — E78.5 HYPERLIPIDEMIA, UNSPECIFIED HYPERLIPIDEMIA TYPE: ICD-10-CM

## 2019-06-05 DIAGNOSIS — E03.9 HYPOTHYROIDISM, UNSPECIFIED TYPE: ICD-10-CM

## 2019-06-05 DIAGNOSIS — E11.69 TYPE 2 DIABETES MELLITUS WITH OTHER SPECIFIED COMPLICATION, WITHOUT LONG-TERM CURRENT USE OF INSULIN (HCC): Primary | ICD-10-CM

## 2019-06-05 DIAGNOSIS — F41.8 DEPRESSION WITH ANXIETY: ICD-10-CM

## 2019-06-05 PROCEDURE — 99214 OFFICE O/P EST MOD 30 MIN: CPT | Performed by: FAMILY MEDICINE

## 2019-06-05 RX ORDER — LOSARTAN POTASSIUM AND HYDROCHLOROTHIAZIDE 12.5; 5 MG/1; MG/1
TABLET ORAL
Qty: 90 TABLET | Refills: 3 | Status: SHIPPED | OUTPATIENT
Start: 2019-06-05 | End: 2020-04-06 | Stop reason: SDUPTHER

## 2019-06-05 RX ORDER — ATORVASTATIN CALCIUM 20 MG/1
TABLET, FILM COATED ORAL
Qty: 90 TABLET | Refills: 3 | Status: SHIPPED | OUTPATIENT
Start: 2019-06-05 | End: 2020-08-13 | Stop reason: SDUPTHER

## 2019-06-05 RX ORDER — LEVOTHYROXINE SODIUM 137 UG/1
137 TABLET ORAL DAILY
Qty: 90 TABLET | Refills: 3 | Status: SHIPPED
Start: 2019-06-05 | End: 2020-09-01 | Stop reason: DRUGHIGH

## 2019-06-05 RX ORDER — ESCITALOPRAM OXALATE 5 MG/1
5 TABLET ORAL DAILY
Qty: 90 TABLET | Refills: 3 | Status: SHIPPED | OUTPATIENT
Start: 2019-06-05 | End: 2020-08-13 | Stop reason: SDUPTHER

## 2019-06-05 ASSESSMENT — PATIENT HEALTH QUESTIONNAIRE - PHQ9
2. FEELING DOWN, DEPRESSED OR HOPELESS: 0
SUM OF ALL RESPONSES TO PHQ9 QUESTIONS 1 & 2: 0
SUM OF ALL RESPONSES TO PHQ QUESTIONS 1-9: 0
SUM OF ALL RESPONSES TO PHQ QUESTIONS 1-9: 0
1. LITTLE INTEREST OR PLEASURE IN DOING THINGS: 0

## 2019-06-05 ASSESSMENT — ENCOUNTER SYMPTOMS
ALLERGIC/IMMUNOLOGIC NEGATIVE: 1
EYES NEGATIVE: 1
GASTROINTESTINAL NEGATIVE: 1
RESPIRATORY NEGATIVE: 1

## 2019-06-05 NOTE — PROGRESS NOTES
SUBJECTIVE:    Patient ID: Avinash Stinson is a 52 y. o.female. HPI:   Patient here for follow-up of multiple medical problems  Patient is a 24-year-old white female. She is diabetic. She's on oral medications. She is compliant with medication. Diabetes is well control. Denies hypoglycemia. She is compliant with medication and diet. A1c is well-controlled. She also have hypertension takes blood pressure medicine. Blood pressure is well-controlled. Denies medication side effects. She have hyperlipidemia in combination with hypertension and diabetes. She take statin therapy. LDL less than 70. Denies medication side effects. She have depression history and takes antidepressants. She is doing well. He's able to keep same level of function. She requests all her medications to be sent to mail order. She also have hypothyroidism. Her Synthroid was recently adjusted. Denies symptoms of hyperthyroidism. Past Medical History:   Diagnosis Date    Benign essential tremor     Diabetes mellitus type II     HTN (hypertension)     Hyperlipidemia     Hypothyroidism     Syncope     Type II or unspecified type diabetes mellitus without mention of complication, not stated as uncontrolled       Current Outpatient Medications   Medication Sig Dispense Refill    levothyroxine (SYNTHROID) 137 MCG tablet Take 1 tablet by mouth Daily 90 tablet 3    escitalopram (LEXAPRO) 5 MG tablet Take 1 tablet by mouth daily 90 tablet 3    losartan-hydrochlorothiazide (HYZAAR) 50-12.5 MG per tablet TAKE 1 TABLET DAILY 90 tablet 3    atorvastatin (LIPITOR) 20 MG tablet TAKE 1 TABLET DAILY 90 tablet 3    metoprolol tartrate (LOPRESSOR) 25 MG tablet TAKE 1 TABLET TWICE A  tablet 3    SITagliptin (JANUVIA) 100 MG tablet Take 1 tablet by mouth daily 90 tablet 3    vitamin B-12 (CYANOCOBALAMIN) 500 MCG tablet Take 500 mcg by mouth daily.  aspirin 81 MG EC tablet Take 81 mg by mouth daily.          No current facility-administered medications for this visit. Allergies   Allergen Reactions    Codeine     Darvocet [Propoxyphene N-Acetaminophen]     Inderal [Propranolol Hcl]     Morphine Rash       Review of Systems   Constitutional: Negative. HENT: Negative. Eyes: Negative. Respiratory: Negative. Cardiovascular: Negative. Gastrointestinal: Negative. Endocrine: Negative. Genitourinary: Negative. Musculoskeletal: Negative. Skin: Negative. Allergic/Immunologic: Negative. Neurological: Negative. Hematological: Negative. Psychiatric/Behavioral: Negative. OBJECTIVE:    Physical Exam   Constitutional: She is oriented to person, place, and time. Vital signs are normal. She appears well-developed and well-nourished. She is active. HENT:   Head: Normocephalic and atraumatic. Eyes: Pupils are equal, round, and reactive to light. Conjunctivae and EOM are normal.   Neck: Normal range of motion and full passive range of motion without pain. Neck supple. Cardiovascular: Normal rate, regular rhythm, S1 normal, S2 normal, normal heart sounds and normal pulses. Pulmonary/Chest: Effort normal and breath sounds normal.   Abdominal: Soft. Normal appearance and bowel sounds are normal.   Musculoskeletal: Normal range of motion. Neurological: She is alert and oriented to person, place, and time. She has normal strength and normal reflexes. Skin: Skin is warm, dry and intact. Psychiatric: She has a normal mood and affect. Her speech is normal and behavior is normal. Judgment and thought content normal. Cognition and memory are normal.      /72 (Site: Left Upper Arm, Position: Sitting, Cuff Size: Medium Adult)   Pulse 65   Temp 97.2 °F (36.2 °C) (Temporal)   Wt 184 lb (83.5 kg)   SpO2 98%   BMI 33.65 kg/m²      ASSESSMENT:     Diagnosis Orders   1.  Type 2 diabetes mellitus with other specified complication, without long-term current use of insulin (HCC) -well control  CBC    Comprehensive Metabolic Panel    Hemoglobin A1C    Lipid Panel    MICROALBUMIN, RANDOM URINE (W/O CREATININE)    Urinalysis    SITagliptin (JANUVIA) 100 MG tablet   2. Essential hypertension -well control  losartan-hydrochlorothiazide (HYZAAR) 50-12.5 MG per tablet    metoprolol tartrate (LOPRESSOR) 25 MG tablet   3. Hyperlipidemia, unspecified hyperlipidemia type -well control  atorvastatin (LIPITOR) 20 MG tablet   4. Hypothyroidism, unspecified type -well control  TSH without Reflex    levothyroxine (SYNTHROID) 137 MCG tablet   5. Depression with anxiety -stable  escitalopram (LEXAPRO) 5 MG tablet        PLAN:    1. Blood work discussed with patient. Encouraged compliant with medication and diet. Continue same regimen. 2. Continue blood pressure medication  3. Continue statin therapy. 4. Continue medications. Recheck TSH in 6 months. 5. Continue medication.   Follow-up 6 months with fasting blood work 1st.

## 2020-01-21 ENCOUNTER — TRANSCRIBE ORDERS (OUTPATIENT)
Dept: ADMINISTRATIVE | Facility: HOSPITAL | Age: 55
End: 2020-01-21

## 2020-01-21 DIAGNOSIS — M96.0 PSEUDARTHROSIS AFTER JOINT FUSION: Primary | ICD-10-CM

## 2020-01-29 ENCOUNTER — HOSPITAL ENCOUNTER (OUTPATIENT)
Dept: CT IMAGING | Facility: HOSPITAL | Age: 55
Discharge: HOME | End: 2020-01-29
Admitting: NURSE PRACTITIONER

## 2020-01-29 DIAGNOSIS — M96.0 PSEUDARTHROSIS AFTER JOINT FUSION: ICD-10-CM

## 2020-01-29 PROCEDURE — 73700 CT LOWER EXTREMITY W/O DYE: CPT

## 2020-04-06 RX ORDER — LOSARTAN POTASSIUM AND HYDROCHLOROTHIAZIDE 12.5; 5 MG/1; MG/1
TABLET ORAL
Qty: 90 TABLET | Refills: 0 | Status: SHIPPED | OUTPATIENT
Start: 2020-04-06 | End: 2020-07-14

## 2020-07-14 RX ORDER — LOSARTAN POTASSIUM AND HYDROCHLOROTHIAZIDE 12.5; 5 MG/1; MG/1
TABLET ORAL
Qty: 30 TABLET | Refills: 5 | Status: SHIPPED | OUTPATIENT
Start: 2020-07-14 | End: 2020-08-13 | Stop reason: SDUPTHER

## 2020-07-29 DIAGNOSIS — E03.9 HYPOTHYROIDISM, UNSPECIFIED TYPE: ICD-10-CM

## 2020-07-29 DIAGNOSIS — E78.5 HYPERLIPIDEMIA, UNSPECIFIED HYPERLIPIDEMIA TYPE: ICD-10-CM

## 2020-07-29 DIAGNOSIS — I10 ESSENTIAL HYPERTENSION: ICD-10-CM

## 2020-07-29 DIAGNOSIS — E11.69 TYPE 2 DIABETES MELLITUS WITH OTHER SPECIFIED COMPLICATION, WITHOUT LONG-TERM CURRENT USE OF INSULIN (HCC): ICD-10-CM

## 2020-07-29 LAB
ALBUMIN SERPL-MCNC: 4.2 G/DL (ref 3.5–5.2)
ALP BLD-CCNC: 63 U/L (ref 35–104)
ALT SERPL-CCNC: 26 U/L (ref 5–33)
ANION GAP SERPL CALCULATED.3IONS-SCNC: 14 MMOL/L (ref 7–19)
AST SERPL-CCNC: 23 U/L (ref 5–32)
BILIRUB SERPL-MCNC: 0.6 MG/DL (ref 0.2–1.2)
BUN BLDV-MCNC: 7 MG/DL (ref 6–20)
CALCIUM SERPL-MCNC: 9.5 MG/DL (ref 8.6–10)
CHLORIDE BLD-SCNC: 102 MMOL/L (ref 98–111)
CHOLESTEROL, TOTAL: 125 MG/DL (ref 160–199)
CO2: 27 MMOL/L (ref 22–29)
CREAT SERPL-MCNC: 0.9 MG/DL (ref 0.5–0.9)
CREATININE URINE: 190.9 MG/DL (ref 4.2–622)
GFR AFRICAN AMERICAN: >59
GFR NON-AFRICAN AMERICAN: >60
GLUCOSE BLD-MCNC: 90 MG/DL (ref 74–109)
HBA1C MFR BLD: 6.3 % (ref 4–6)
HCT VFR BLD CALC: 43.9 % (ref 37–47)
HDLC SERPL-MCNC: 40 MG/DL (ref 65–121)
HEMOGLOBIN: 13.5 G/DL (ref 12–16)
LDL CHOLESTEROL CALCULATED: 63 MG/DL
MCH RBC QN AUTO: 25.8 PG (ref 27–31)
MCHC RBC AUTO-ENTMCNC: 30.8 G/DL (ref 33–37)
MCV RBC AUTO: 83.9 FL (ref 81–99)
MICROALBUMIN UR-MCNC: 2.7 MG/DL (ref 0–19)
MICROALBUMIN/CREAT UR-RTO: 14.1 MG/G
PDW BLD-RTO: 12.9 % (ref 11.5–14.5)
PLATELET # BLD: 304 K/UL (ref 130–400)
PMV BLD AUTO: 10.3 FL (ref 9.4–12.3)
POTASSIUM SERPL-SCNC: 4.2 MMOL/L (ref 3.5–5)
RBC # BLD: 5.23 M/UL (ref 4.2–5.4)
SODIUM BLD-SCNC: 143 MMOL/L (ref 136–145)
TOTAL PROTEIN: 7.3 G/DL (ref 6.6–8.7)
TRIGL SERPL-MCNC: 112 MG/DL (ref 0–149)
TSH SERPL DL<=0.05 MIU/L-ACNC: 0.56 UIU/ML (ref 0.27–4.2)
WBC # BLD: 6.6 K/UL (ref 4.8–10.8)

## 2020-08-13 ENCOUNTER — OFFICE VISIT (OUTPATIENT)
Dept: FAMILY MEDICINE CLINIC | Age: 55
End: 2020-08-13
Payer: COMMERCIAL

## 2020-08-13 VITALS
OXYGEN SATURATION: 98 % | TEMPERATURE: 97.2 F | DIASTOLIC BLOOD PRESSURE: 74 MMHG | HEIGHT: 62 IN | WEIGHT: 189.6 LBS | BODY MASS INDEX: 34.89 KG/M2 | SYSTOLIC BLOOD PRESSURE: 130 MMHG | HEART RATE: 106 BPM

## 2020-08-13 PROCEDURE — 99214 OFFICE O/P EST MOD 30 MIN: CPT | Performed by: FAMILY MEDICINE

## 2020-08-13 RX ORDER — ATORVASTATIN CALCIUM 20 MG/1
TABLET, FILM COATED ORAL
Qty: 90 TABLET | Refills: 3 | Status: SHIPPED | OUTPATIENT
Start: 2020-08-13 | End: 2021-08-16

## 2020-08-13 RX ORDER — ESCITALOPRAM OXALATE 5 MG/1
5 TABLET ORAL DAILY
Qty: 90 TABLET | Refills: 3 | Status: SHIPPED | OUTPATIENT
Start: 2020-08-13 | End: 2021-08-16

## 2020-08-13 RX ORDER — LEVOTHYROXINE SODIUM 0.15 MG/1
150 TABLET ORAL DAILY
Qty: 90 TABLET | Refills: 3 | Status: SHIPPED | OUTPATIENT
Start: 2020-08-13 | End: 2021-02-10 | Stop reason: ALTCHOICE

## 2020-08-13 RX ORDER — LEVOTHYROXINE SODIUM 137 UG/1
137 TABLET ORAL DAILY
Qty: 90 TABLET | Refills: 3 | Status: CANCELLED | OUTPATIENT
Start: 2020-08-13

## 2020-08-13 RX ORDER — LOSARTAN POTASSIUM AND HYDROCHLOROTHIAZIDE 12.5; 5 MG/1; MG/1
TABLET ORAL
Qty: 30 TABLET | Refills: 5 | Status: SHIPPED | OUTPATIENT
Start: 2020-08-13 | End: 2021-08-16

## 2020-08-13 ASSESSMENT — ENCOUNTER SYMPTOMS
EYES NEGATIVE: 1
ALLERGIC/IMMUNOLOGIC NEGATIVE: 1
RESPIRATORY NEGATIVE: 1
GASTROINTESTINAL NEGATIVE: 1

## 2020-08-13 NOTE — PROGRESS NOTES
SUBJECTIVE:    Patient ID: Dianne Edouard is a 62 y. o.female. HPI:   Patient here for follow-up of multiple medical problems. Patient is a 78-year-old white female. She is diabetic. She is compliant with medication. A1c is well controlled. With been stable. No complications. She also have hypertension takes blood pressure medicine. Blood pressures well controlled. No proteinuria. She have hyperlipidemia and takes Lipitor. LDL less than 70. Denies medication side effect. She have hypothyroidism. Synthroid was changed to 150. She has been seen ENT. Request a refill on Synthroid. She also due for shingles vaccine. She have depression and takes Lexapro. Medication is effective. Denies medication side effects symptoms are well controlled. Past Medical History:   Diagnosis Date    Benign essential tremor     Diabetes mellitus type II     HTN (hypertension)     Hyperlipidemia     Hypothyroidism     Syncope     Type II or unspecified type diabetes mellitus without mention of complication, not stated as uncontrolled       Current Outpatient Medications   Medication Sig Dispense Refill    losartan-hydroCHLOROthiazide (HYZAAR) 50-12.5 MG per tablet TAKE 1 TABLET BY MOUTH EVERY DAY 30 tablet 5    escitalopram (LEXAPRO) 5 MG tablet Take 1 tablet by mouth daily 90 tablet 3    atorvastatin (LIPITOR) 20 MG tablet TAKE 1 TABLET DAILY 90 tablet 3    metoprolol tartrate (LOPRESSOR) 25 MG tablet TAKE 1 TABLET TWICE A  tablet 3    SITagliptin (JANUVIA) 100 MG tablet Take 1 tablet by mouth daily 90 tablet 3    levothyroxine (SYNTHROID) 150 MCG tablet Take 1 tablet by mouth Daily 90 tablet 3    levothyroxine (SYNTHROID) 137 MCG tablet Take 1 tablet by mouth Daily 90 tablet 3    vitamin B-12 (CYANOCOBALAMIN) 500 MCG tablet Take 500 mcg by mouth daily.  aspirin 81 MG EC tablet Take 81 mg by mouth daily. No current facility-administered medications for this visit.        Allergies normal. No murmur. Pulmonary:      Effort: Pulmonary effort is normal.      Breath sounds: Normal breath sounds. Abdominal:      General: Bowel sounds are normal. There is no distension or abdominal bruit. Palpations: Abdomen is soft. There is no mass. Hernia: No hernia is present. Musculoskeletal: Normal range of motion. Right lower leg: No edema. Left lower leg: No edema. Lymphadenopathy:      Cervical: No cervical adenopathy. Right cervical: No superficial cervical adenopathy. Left cervical: No superficial cervical adenopathy. Upper Body:      Right upper body: No supraclavicular adenopathy. Left upper body: No supraclavicular adenopathy. Skin:     General: Skin is warm and dry. Coloration: Skin is not pale. Findings: No lesion or rash. Nails: There is no clubbing. Neurological:      Mental Status: She is alert and oriented to person, place, and time. Motor: No weakness or tremor. Coordination: Coordination normal.      Deep Tendon Reflexes: Reflexes are normal and symmetric. Psychiatric:         Attention and Perception: Attention normal.         Mood and Affect: Mood normal.         Speech: Speech normal.         Behavior: Behavior normal. Behavior is cooperative. Thought Content: Thought content normal.         Cognition and Memory: Cognition and memory normal.         Judgment: Judgment normal.        /74 (Site: Right Upper Arm, Position: Sitting, Cuff Size: Medium Adult)   Pulse 106   Temp 97.2 °F (36.2 °C) (Temporal)   Ht 5' 2\" (1.575 m)   Wt 189 lb 9.6 oz (86 kg)   SpO2 98%   BMI 34.68 kg/m²      ASSESSMENT:     Diagnosis Orders   1.  Type 2 diabetes mellitus with other specified complication, without long-term current use of insulin ([facility])-well-controlled SITagliptin (JANUVIA) 100 MG tablet    CBC    Comprehensive Metabolic Panel    Hemoglobin A1C    Urinalysis    MICROALBUMIN, RANDOM URINE (W/O CREATININE) Lipid Panel   2. Essential hypertension-well-controlled losartan-hydroCHLOROthiazide (HYZAAR) 50-12.5 MG per tablet    metoprolol tartrate (LOPRESSOR) 25 MG tablet   3. Hyperlipidemia, unspecified hyperlipidemia type-on therapy atorvastatin (LIPITOR) 20 MG tablet   4. Hypothyroidism, unspecified type-well-controlled levothyroxine (SYNTHROID) 150 MCG tablet    TSH without Reflex   5. Depression with anxiety-well-controlled escitalopram (LEXAPRO) 5 MG tablet   6. Need for shingles vaccine  [facility])        PLAN:    1. Monofilament test.  Blood were discussed with patient. Repeat blood work in 3 months. Refill medications. Encourage diet and exercise. 2.  Continue medication  3. Continue medication  4. Continue medications  5. Continue medications  6.   Shingles vaccine  Follow-up 3 months with fasting blood work

## 2020-08-14 PROCEDURE — 90750 HZV VACC RECOMBINANT IM: CPT | Performed by: FAMILY MEDICINE

## 2020-08-14 PROCEDURE — 90471 IMMUNIZATION ADMIN: CPT | Performed by: FAMILY MEDICINE

## 2020-09-01 ENCOUNTER — OFFICE VISIT (OUTPATIENT)
Dept: FAMILY MEDICINE CLINIC | Age: 55
End: 2020-09-01
Payer: COMMERCIAL

## 2020-09-01 VITALS
RESPIRATION RATE: 18 BRPM | HEART RATE: 101 BPM | BODY MASS INDEX: 32.01 KG/M2 | OXYGEN SATURATION: 98 % | WEIGHT: 175 LBS | TEMPERATURE: 97.3 F

## 2020-09-01 LAB — S PYO AG THROAT QL: NORMAL

## 2020-09-01 PROCEDURE — 99213 OFFICE O/P EST LOW 20 MIN: CPT | Performed by: FAMILY MEDICINE

## 2020-09-01 PROCEDURE — 87880 STREP A ASSAY W/OPTIC: CPT | Performed by: FAMILY MEDICINE

## 2020-09-01 RX ORDER — ONDANSETRON 4 MG/1
4 TABLET, FILM COATED ORAL 3 TIMES DAILY PRN
Qty: 30 TABLET | Refills: 0 | Status: SHIPPED | OUTPATIENT
Start: 2020-09-01

## 2020-09-01 ASSESSMENT — ENCOUNTER SYMPTOMS
SORE THROAT: 1
CONSTIPATION: 0
EYE PAIN: 0
NAUSEA: 1
EYE DISCHARGE: 0
RHINORRHEA: 0
VOMITING: 0
DIARRHEA: 1
COUGH: 0
ABDOMINAL PAIN: 0
SHORTNESS OF BREATH: 0

## 2020-09-01 NOTE — LETTER
[facility]  [address] [address]  Phone: [phone]  Fax: [phone]    Elba Varela MD        September 1, 2020     Patient: Melisa Del Angel   YOB: 1965   Date of Visit: 9/1/2020       To Whom it May Concern:    Law Chacko was seen in my clinic on 9/1/2020. She may return to work on 9/15/20 or symptom improvement for 72 hours with negative COVID-19 screen. If you have any questions or concerns, please don't hesitate to call.     Sincerely,         Elba Varela MD

## 2020-09-01 NOTE — PROGRESS NOTES
Brendon Downing is a 62 y.o. female who presents today for   Chief Complaint   Patient presents with    Diarrhea     Patient presents c/o diarrhea that started this morning    Nausea    Chills    Headache     started last night    Fatigue    Letter for School/Work     Patient works at [address] [employer] and needs a work note.  Other     Patient lives alone; boyfriend visits but hasn't been there in 1 week. Chief Complaint: Sick    HPI  Patient reports last night she started having a headache and developed some nausea and diarrhea and has been having a lot of fatigue. She states that she has not had a recorded fever but last night had chills and was cold but did not check her temperature. She states that she has had a sore throat but has not had any other specific symptoms at this time. She denies any aggravating or relieving factors for symptoms though did get a little bit of relief with use of Advil. She denies any specific known sick contacts. Review of Systems   Constitutional: Positive for activity change, chills and fatigue. Negative for appetite change and fever. HENT: Positive for sore throat. Negative for congestion and rhinorrhea. Eyes: Negative for pain and discharge. Respiratory: Negative for cough and shortness of breath. Cardiovascular: Negative for chest pain and palpitations. Gastrointestinal: Positive for diarrhea and nausea. Negative for abdominal pain, constipation and vomiting. Endocrine: Negative for cold intolerance and heat intolerance. Genitourinary: Negative for dysuria and hematuria. Musculoskeletal: Positive for myalgias. Negative for gait problem and neck pain. Skin: Negative for rash and wound. Neurological: Positive for headaches.        Past Medical History:   Diagnosis Date    Benign essential tremor     Diabetes mellitus type II     HTN (hypertension)     Hyperlipidemia     Hypothyroidism     Syncope     Type II or unspecified type diabetes mellitus without mention of complication, not stated as uncontrolled        Current Outpatient Medications   Medication Sig Dispense Refill    ondansetron (ZOFRAN) 4 MG tablet Take 1 tablet by mouth 3 times daily as needed for Nausea or Vomiting 30 tablet 0    losartan-hydroCHLOROthiazide (HYZAAR) 50-12.5 MG per tablet TAKE 1 TABLET BY MOUTH EVERY DAY 30 tablet 5    escitalopram (LEXAPRO) 5 MG tablet Take 1 tablet by mouth daily 90 tablet 3    atorvastatin (LIPITOR) 20 MG tablet TAKE 1 TABLET DAILY 90 tablet 3    metoprolol tartrate (LOPRESSOR) 25 MG tablet TAKE 1 TABLET TWICE A  tablet 3    SITagliptin (JANUVIA) 100 MG tablet Take 1 tablet by mouth daily 90 tablet 3    levothyroxine (SYNTHROID) 150 MCG tablet Take 1 tablet by mouth Daily 90 tablet 3    vitamin B-12 (CYANOCOBALAMIN) 500 MCG tablet Take 500 mcg by mouth daily.  aspirin 81 MG EC tablet Take 81 mg by mouth daily. No current facility-administered medications for this visit.            Allergies   Allergen Reactions    Codeine     Darvocet [Propoxyphene N-Acetaminophen]     Inderal [Propranolol Hcl]     Morphine Rash       Past Surgical History:   Procedure Laterality Date    ARTHRODESIS Left 12/5/2016    LAPIDUS TYPE HALLUX VALGUS REPAIR VERSUS 1ST MTP ARTHRODESIS performed by Kenneth Rodríguez DPM at [facility] OR    FINE NEEDLE ASPIRATION N/A     Thyroid koelch     FOOT SURGERY Right 09/07/2015    Ganglion cyst removal    HAMMER TOE SURGERY Left 12/5/2016    TOE HAMMER REPAIR WITH PINNING 2ND DIGIT LEFT FOOT  performed [facility] at [facility]    HYSTERECTOMY  2005    Total    IN COLSC FLX W/RMVL OF TUMOR POLYP LESION SNARE TQ N/A 4/17/2017    Dr Lezama-diverticulosis, tubular AP (-) dysplasia--5 yr recall    THYROIDECTOMY, PARTIAL Right        Social History     Tobacco Use    Smoking status: Never Smoker    Smokeless tobacco: Never Used   Substance Use Topics    Alcohol use: No    Drug use: No       Family History   Problem Relation Age of Onset    Coronary Art Dis Mother         quadruple bypass    Heart Disease Mother     Heart Attack Father          of    Coronary Art Dis Father          of MI after having a couple of other ones    Esophageal Cancer Father     Diabetes Sister     High Cholesterol Sister     Other Sister         cerebrovascular dz    Cancer Sister         passed away from cancer that started out as breast  cancer and spread    Coronary Art Dis Brother         \"half of heart is dead\"    Diabetes Brother     High Cholesterol Brother     Colon Polyps Brother     Diabetes Maternal Grandmother     High Cholesterol Sister     High Cholesterol Brother     Colon Cancer Neg Hx     Liver Cancer Neg Hx     Liver Disease Neg Hx     Rectal Cancer Neg Hx     Stomach Cancer Neg Hx        Pulse 101   Temp 97.3 °F (36.3 °C) (Temporal)   Resp 18   Wt 175 lb (79.4 kg)   SpO2 98%   BMI 32.01 kg/m²     Physical Exam  Vitals signs and nursing note reviewed. Constitutional:       Appearance: She is well-developed. HENT:      Head: Normocephalic and atraumatic. Right Ear: Hearing, tympanic membrane, ear canal and external ear normal.      Left Ear: Hearing, tympanic membrane, ear canal and external ear normal.      Nose: Congestion and rhinorrhea present. Mouth/Throat:      Mouth: Mucous membranes are moist.      Pharynx: Posterior oropharyngeal erythema present. No oropharyngeal exudate. Eyes:      General: No scleral icterus. Right eye: No discharge. Left eye: No discharge. Conjunctiva/sclera: Conjunctivae normal.      Pupils: Pupils are equal, round, and reactive to light. Neck:      Musculoskeletal: Normal range of motion and neck supple. Trachea: No tracheal deviation. Cardiovascular:      Rate and Rhythm: Normal rate and regular rhythm. Heart sounds: Normal heart sounds. No murmur. No friction rub. No gallop. Pulmonary:      Effort: Pulmonary effort is normal. No respiratory distress. Breath sounds: Normal breath sounds. No wheezing or rales. Abdominal:      General: Bowel sounds are normal. There is no distension. Palpations: Abdomen is soft. Tenderness: There is no abdominal tenderness. Musculoskeletal: Normal range of motion. General: No deformity ( no gross deformities of upper or lower extremities bilaterally). Right lower leg: No edema. Left lower leg: No edema. Lymphadenopathy:      Cervical: No cervical adenopathy. Skin:     General: Skin is warm and dry. Findings: No erythema or rash. Neurological:      Mental Status: She is alert and oriented to person, place, and time. Cranial Nerves: No cranial nerve deficit. Motor: No abnormal muscle tone. Psychiatric:         Behavior: Behavior normal.         Thought Content: Thought content normal.         Assessment:       ICD-10-CM    1. Nausea  R11.0 ondansetron (ZOFRAN) 4 MG tablet     COVID-19 Ambulatory     COVID-19 Ambulatory   2. Sore throat  J02.9 COVID-19 Ambulatory     COVID-19 Ambulatory     POCT rapid strep A   3. Viral illness  B34.9 COVID-19 Ambulatory     COVID-19 Ambulatory   4. Diarrhea, unspecified type  R19.7 COVID-19 Ambulatory     COVID-19 Ambulatory   5. Nonintractable headache, unspecified chronicity pattern, unspecified headache type  R51 COVID-19 Ambulatory     COVID-19 Ambulatory       Plan:  Discussed suggestive diagnosis and with her strep being negative discussed the likelihood of a viral cause of her symptoms and discussed possibility of swabbing for COVID-19 with patient agreeing. Discussed expected course, and proper use of medication, including already available home medications and OTC medications. Discussed the need for home quarantine and duration with a negative and positive COVID-19 screen. Discussed signs and symptoms requiring medical attention.   All questions were answered and patient voiced understanding and agreement with plan as discussed. Orders Placed This Encounter   Procedures    COVID-19 Ambulatory     Standing Status:   Future     Number of Occurrences:   1     Standing Expiration Date:   9/1/2021     Scheduling Instructions:      Saline media preferred given current shortage of viral transport media but both acceptable     Order Specific Question:   Is this test for diagnosis or screening? Answer:   Diagnosis of ill patient     Order Specific Question:   Symptomatic for COVID-19 as defined by CDC? Answer:   Yes     Order Specific Question:   Date of Symptom Onset     Answer:   8/31/2020     Order Specific Question:   Hospitalized for COVID-19? Answer:   No     Order Specific Question:   Admitted to ICU for COVID-19? Answer:   No     Order Specific Question:   Employed in healthcare setting? Answer:   No     Order Specific Question:   Resident in a congregate (group) care setting? Answer:   No     Order Specific Question:   Pregnant? Answer:   No     Order Specific Question:   Previously tested for COVID-19? Answer:   No    POCT rapid strep A     Orders Placed This Encounter   Medications    ondansetron (ZOFRAN) 4 MG tablet     Sig: Take 1 tablet by mouth 3 times daily as needed for Nausea or Vomiting     Dispense:  30 tablet     Refill:  0     Medications Discontinued During This Encounter   Medication Reason    levothyroxine (SYNTHROID) 137 MCG tablet DOSE ADJUSTMENT     Patient Instructions   Patient given educational handouts and has had all questions answered. Patient voices understanding and agrees to plans along with risks and benefits of plan. Patient is instructed to continue prior meds,diet, and exercise plans as instructed. Patient agrees to follow up as instructed and sooner if needed. Patient agrees to go to ER if condition becomes emergent.       Return if symptoms worsen or fail to improve, for next scheduled

## 2020-09-01 NOTE — PATIENT INSTRUCTIONS
Patient Education        Coronavirus ([id number]): Care Instructions  Overview  The coronavirus disease (COVID-19) is caused by a virus. Symptoms may include a fever, a cough, and shortness of breath. It mainly spreads person-to-person through droplets from coughing and sneezing. The virus also can spread when people are in close contact with someone who is infected. Most people have mild symptoms and can take care of themselves at home. If their symptoms get worse, they may need care in a hospital. There is no medicine to fight the virus. It's important to not spread the virus to others. If you have COVID-19, wear a face cover anytime you are around other people. You need to isolate yourself while you are sick. Your doctor or local public health official will tell you when you no longer need to be isolated. Leave your home only if you need to get medical care. Follow-up care is a key part of your treatment and safety. Be sure to make and go to all appointments, and call your doctor if you are having problems. It's also a good idea to know your test results and keep a list of the medicines you take. How can you care for yourself at home? · Get extra rest. It can help you feel better. · Drink plenty of fluids. This helps replace fluids lost from fever. Fluids also help ease a scratchy throat. Water, soup, fruit juice, and hot tea with lemon are good choices. · Take acetaminophen (such as Tylenol) to reduce a fever. It may also help with muscle aches. Read and follow all instructions on the label. · Sponge your body with lukewarm water to help with fever. Don't use cold water or ice. · Use petroleum jelly on sore skin. This can help if the skin around your nose and lips becomes sore from rubbing a lot with tissues. Tips for isolation  · Wear a cloth face cover when you are around other people. It can help stop the spread of the virus when you cough or sneeze. · Limit contact with people in your home.  If possible, stay in a separate bedroom and use a separate bathroom. · If you have to leave home, avoid crowds and try to stay at least 6 feet away from other people. · Avoid contact with pets and other animals. · Cover your mouth and nose with a tissue when you cough or sneeze. Then throw it in the trash right away. · Wash your hands often, especially after you cough or sneeze. Use soap and water, and scrub for at least 20 seconds. If soap and water aren't available, use an alcohol-based hand . · Don't share personal household items. These include bedding, towels, cups and glasses, and eating utensils. · [address] in the warmest water allowed for the fabric type, and dry it completely. It's okay to wash other people's laundry with yours. · Clean and disinfect your home every day. Use household  and disinfectant wipes or sprays. Take special care to clean things that you grab with your hands. These include doorknobs, remote controls, phones, and handles on your refrigerator and microwave. And don't forget countertops, tabletops, bathrooms, and computer keyboards. When should you call for help? [id number] anytime you think you may need emergency care. For example, call if you have life-threatening symptoms, such as:  · You have severe trouble breathing. (You can't talk at all.)  · You have constant chest pain or pressure. · You are severely dizzy or lightheaded. · You are confused or can't think clearly. · Your face and lips have a blue color. · You pass out (lose consciousness) or are very hard to wake up. Call your doctor now or seek immediate medical care if:  · You have moderate trouble breathing. (You can't speak a full sentence.)  · You are coughing up blood (more than about 1 teaspoon). · You have signs of low blood pressure. These include feeling lightheaded; being too weak to stand; and having cold, pale, clammy skin.   Watch closely for changes in your health, and be sure to contact your doctor if:  · Your symptoms get worse. · You are not getting better as expected. Call before you go to the doctor's office. Follow their instructions. And wear a cloth face cover. Current as of: May 8, 2020               Content Version: 12.5  © 2006-2020 [employer]. Care instructions adapted under license by [facility] ([facility]). If you have questions about a medical condition or this instruction, always ask your healthcare professional. [address] any warranty or liability for your use of this information.

## 2020-09-04 LAB — SARS-COV-2, NAA: NOT DETECTED

## 2020-09-08 ENCOUNTER — TELEPHONE (OUTPATIENT)
Dept: FAMILY MEDICINE CLINIC | Age: 55
End: 2020-09-08

## 2020-09-08 NOTE — TELEPHONE ENCOUNTER
Pt called and request covid test results and return to work letter be faxed to her work at [phone] which was done.

## 2020-09-08 NOTE — LETTER
[facility] AT [facility]  [address] [address]  Phone: [phone]  Fax: [phone]    Carmen Zimmerman MD        September 8, 2020     Patient: Mary Reis   YOB: 1965   Date of Visit: 9/8/2020       To Whom it May Concern:    Steve Sexton was seen in my clinic on 09/01/2020. She may return to work on 09/08/2020. If you have any questions or concerns, please don't hesitate to call.     Sincerely,         Carmen Zimmerman MD

## 2021-02-05 DIAGNOSIS — E11.69 TYPE 2 DIABETES MELLITUS WITH OTHER SPECIFIED COMPLICATION, WITHOUT LONG-TERM CURRENT USE OF INSULIN (HCC): ICD-10-CM

## 2021-02-05 DIAGNOSIS — E03.9 HYPOTHYROIDISM, UNSPECIFIED TYPE: ICD-10-CM

## 2021-02-05 LAB
ALBUMIN SERPL-MCNC: 4 G/DL (ref 3.5–5.2)
ALP BLD-CCNC: 60 U/L (ref 35–104)
ALT SERPL-CCNC: 26 U/L (ref 5–33)
ANION GAP SERPL CALCULATED.3IONS-SCNC: 13 MMOL/L (ref 7–19)
AST SERPL-CCNC: 23 U/L (ref 5–32)
BACTERIA: NEGATIVE /HPF
BILIRUB SERPL-MCNC: 0.4 MG/DL (ref 0.2–1.2)
BILIRUBIN URINE: NEGATIVE
BLOOD, URINE: NEGATIVE
BUN BLDV-MCNC: 10 MG/DL (ref 6–20)
CALCIUM SERPL-MCNC: 8.8 MG/DL (ref 8.6–10)
CHLORIDE BLD-SCNC: 102 MMOL/L (ref 98–111)
CHOLESTEROL, TOTAL: 141 MG/DL (ref 160–199)
CLARITY: ABNORMAL
CO2: 25 MMOL/L (ref 22–29)
COLOR: YELLOW
CREAT SERPL-MCNC: 0.8 MG/DL (ref 0.5–0.9)
CRYSTALS, UA: ABNORMAL /HPF
EPITHELIAL CELLS, UA: 5 /HPF (ref 0–5)
GFR AFRICAN AMERICAN: >59
GFR NON-AFRICAN AMERICAN: >60
GLUCOSE BLD-MCNC: 139 MG/DL (ref 74–109)
GLUCOSE URINE: NEGATIVE MG/DL
HBA1C MFR BLD: 6.2 % (ref 4–6)
HCT VFR BLD CALC: 43.4 % (ref 37–47)
HDLC SERPL-MCNC: 43 MG/DL (ref 65–121)
HEMOGLOBIN: 13.2 G/DL (ref 12–16)
HYALINE CASTS: 1 /HPF (ref 0–8)
KETONES, URINE: NEGATIVE MG/DL
LDL CHOLESTEROL CALCULATED: 79 MG/DL
LEUKOCYTE ESTERASE, URINE: ABNORMAL
MCH RBC QN AUTO: 26.5 PG (ref 27–31)
MCHC RBC AUTO-ENTMCNC: 30.4 G/DL (ref 33–37)
MCV RBC AUTO: 87 FL (ref 81–99)
MICROALBUMIN UR-MCNC: 1.3 MG/DL (ref 0–19)
NITRITE, URINE: NEGATIVE
PDW BLD-RTO: 12.9 % (ref 11.5–14.5)
PH UA: 6 (ref 5–8)
PLATELET # BLD: 274 K/UL (ref 130–400)
PMV BLD AUTO: 10.2 FL (ref 9.4–12.3)
POTASSIUM SERPL-SCNC: 4.4 MMOL/L (ref 3.5–5)
PROTEIN UA: ABNORMAL MG/DL
RBC # BLD: 4.99 M/UL (ref 4.2–5.4)
RBC UA: 1 /HPF (ref 0–4)
SODIUM BLD-SCNC: 140 MMOL/L (ref 136–145)
SPECIFIC GRAVITY UA: 1.03 (ref 1–1.03)
TOTAL PROTEIN: 7.4 G/DL (ref 6.6–8.7)
TRIGL SERPL-MCNC: 94 MG/DL (ref 0–149)
TSH SERPL DL<=0.05 MIU/L-ACNC: 7.06 UIU/ML (ref 0.27–4.2)
UROBILINOGEN, URINE: 1 E.U./DL
WBC # BLD: 6.2 K/UL (ref 4.8–10.8)
WBC UA: 8 /HPF (ref 0–5)

## 2021-02-10 ENCOUNTER — OFFICE VISIT (OUTPATIENT)
Dept: FAMILY MEDICINE CLINIC | Age: 56
End: 2021-02-10
Payer: COMMERCIAL

## 2021-02-10 VITALS
WEIGHT: 200.6 LBS | SYSTOLIC BLOOD PRESSURE: 124 MMHG | BODY MASS INDEX: 36.69 KG/M2 | HEART RATE: 92 BPM | OXYGEN SATURATION: 98 % | DIASTOLIC BLOOD PRESSURE: 72 MMHG

## 2021-02-10 DIAGNOSIS — E03.9 HYPOTHYROIDISM, UNSPECIFIED TYPE: ICD-10-CM

## 2021-02-10 DIAGNOSIS — E78.5 HYPERLIPIDEMIA, UNSPECIFIED HYPERLIPIDEMIA TYPE: ICD-10-CM

## 2021-02-10 DIAGNOSIS — Z12.31 SCREENING MAMMOGRAM, ENCOUNTER FOR: ICD-10-CM

## 2021-02-10 DIAGNOSIS — E11.69 TYPE 2 DIABETES MELLITUS WITH OTHER SPECIFIED COMPLICATION, WITHOUT LONG-TERM CURRENT USE OF INSULIN (HCC): Primary | ICD-10-CM

## 2021-02-10 DIAGNOSIS — I10 ESSENTIAL HYPERTENSION: ICD-10-CM

## 2021-02-10 DIAGNOSIS — Z23 NEED FOR SHINGLES VACCINE: ICD-10-CM

## 2021-02-10 PROCEDURE — 99214 OFFICE O/P EST MOD 30 MIN: CPT | Performed by: FAMILY MEDICINE

## 2021-02-10 RX ORDER — LEVOTHYROXINE SODIUM 175 UG/1
175 TABLET ORAL DAILY
Qty: 90 TABLET | Refills: 3 | Status: SHIPPED | OUTPATIENT
Start: 2021-02-10 | End: 2021-10-20

## 2021-02-10 RX ORDER — PEN NEEDLE, DIABETIC 31 GX5/16"
1 NEEDLE, DISPOSABLE MISCELLANEOUS DAILY
Qty: 100 EACH | Refills: 3 | Status: SHIPPED | OUTPATIENT
Start: 2021-02-10

## 2021-02-10 RX ORDER — DULAGLUTIDE 0.75 MG/.5ML
0.75 INJECTION, SOLUTION SUBCUTANEOUS WEEKLY
Qty: 4 PEN | Refills: 0 | Status: SHIPPED | OUTPATIENT
Start: 2021-02-10 | End: 2021-03-08

## 2021-02-10 ASSESSMENT — PATIENT HEALTH QUESTIONNAIRE - PHQ9
SUM OF ALL RESPONSES TO PHQ QUESTIONS 1-9: 0
SUM OF ALL RESPONSES TO PHQ QUESTIONS 1-9: 0

## 2021-02-10 ASSESSMENT — ENCOUNTER SYMPTOMS
EYES NEGATIVE: 1
GASTROINTESTINAL NEGATIVE: 1
RESPIRATORY NEGATIVE: 1
ALLERGIC/IMMUNOLOGIC NEGATIVE: 1

## 2021-02-10 NOTE — PROGRESS NOTES
SUBJECTIVE:    Patient ID: Serina Lubin is a 60 y. o.female. HPI:   Patient here for follow-up of multiple medical problems. Patient is a 49-year-old white female. She has diabetes. She is on Januvia. A1c is well controlled but recent blood work had shown increased blood sugars. Blood sugars are running in the 200s to 300s. Denies any thing different. She cannot tolerate Metformin. She cannot tolerate any medication that make her urinate more. She have some weight gain. She also have hypertension. Takes blood pressure medicine. Blood pressures well controlled. She is on Lipitor for cholesterol management. Denies medication side effect. She also have hypothyroidism. TSH is elevated. Denies symptoms of hypothyroidism. She had an episode where her arms got really heavy for a couple of minutes. Episode was bilateral.  She has been under more stress lately. Denies any chest pains or shortness of breath.   Health maintenance  Patient is due for mammography and shingles vaccine       Past Medical History:   Diagnosis Date    Benign essential tremor     Diabetes mellitus type II     HTN (hypertension)     Hyperlipidemia     Hypothyroidism     Syncope     Type II or unspecified type diabetes mellitus without mention of complication, not stated as uncontrolled       Current Outpatient Medications   Medication Sig Dispense Refill    levothyroxine (SYNTHROID) 175 MCG tablet Take 1 tablet by mouth Daily 90 tablet 3    Dulaglutide (TRULICITY) 2.99 BT/0.5ZM SOPN Inject 0.75 mg into the skin once a week 4 pen 0    Dulaglutide 1.5 MG/0.5ML SOPN Inject 1.5 mg into the skin once a week 1 pen 5    Insulin Pen Needle (B-D UF III MINI PEN NEEDLES) 31G X 5 MM MISC 1 each by Does not apply route daily 100 each 3    ondansetron (ZOFRAN) 4 MG tablet Take 1 tablet by mouth 3 times daily as needed for Nausea or Vomiting 30 tablet 0  losartan-hydroCHLOROthiazide (HYZAAR) 50-12.5 MG per tablet TAKE 1 TABLET BY MOUTH EVERY DAY 30 tablet 5    escitalopram (LEXAPRO) 5 MG tablet Take 1 tablet by mouth daily 90 tablet 3    atorvastatin (LIPITOR) 20 MG tablet TAKE 1 TABLET DAILY 90 tablet 3    metoprolol tartrate (LOPRESSOR) 25 MG tablet TAKE 1 TABLET TWICE A  tablet 3    vitamin B-12 (CYANOCOBALAMIN) 500 MCG tablet Take 500 mcg by mouth daily.  aspirin 81 MG EC tablet Take 81 mg by mouth daily. No current facility-administered medications for this visit. Allergies   Allergen Reactions    Metformin And Related Nausea And Vomiting    Codeine     Darvocet [Propoxyphene N-Acetaminophen]     Inderal [Propranolol Hcl]     Morphine Rash       Review of Systems   Constitutional: Negative. HENT: Negative. Eyes: Negative. Respiratory: Negative. Cardiovascular: Negative. Gastrointestinal: Negative. Endocrine: Negative. Genitourinary: Negative. Musculoskeletal: Negative. Skin: Negative. Allergic/Immunologic: Negative. Neurological: Negative. Hematological: Negative. Psychiatric/Behavioral: Negative. OBJECTIVE:    Physical Exam  Constitutional:       Appearance: Normal appearance. She is well-developed and well-groomed. HENT:      Head: Normocephalic and atraumatic. Right Ear: Tympanic membrane, ear canal and external ear normal. There is no impacted cerumen. Left Ear: Tympanic membrane, ear canal and external ear normal. There is no impacted cerumen. Nose: Nose normal.      Mouth/Throat:      Lips: Pink. Mouth: Mucous membranes are moist.      Dentition: Normal dentition. Pharynx: Oropharynx is clear. Uvula midline. Eyes:      General: Lids are normal.         Right eye: No discharge. Left eye: No discharge. Extraocular Movements: Extraocular movements intact.       Conjunctiva/sclera: Conjunctivae normal. Right eye: Right conjunctiva is not injected. Left eye: Left conjunctiva is not injected. Pupils: Pupils are equal, round, and reactive to light. Neck:      Musculoskeletal: Full passive range of motion without pain, normal range of motion and neck supple. Thyroid: No thyromegaly. Vascular: No carotid bruit or JVD. Cardiovascular:      Rate and Rhythm: Normal rate and regular rhythm. Pulses: Normal pulses. Carotid pulses are 2+ on the right side and 2+ on the left side. Radial pulses are 2+ on the right side and 2+ on the left side. Heart sounds: Normal heart sounds, S1 normal and S2 normal. No murmur. Pulmonary:      Effort: Pulmonary effort is normal.      Breath sounds: Normal breath sounds. Abdominal:      General: Bowel sounds are normal. There is no distension or abdominal bruit. Palpations: Abdomen is soft. There is no mass. Hernia: No hernia is present. Musculoskeletal: Normal range of motion. Right lower leg: No edema. Left lower leg: No edema. Lymphadenopathy:      Cervical: No cervical adenopathy. Right cervical: No superficial cervical adenopathy. Left cervical: No superficial cervical adenopathy. Upper Body:      Right upper body: No supraclavicular adenopathy. Left upper body: No supraclavicular adenopathy. Skin:     General: Skin is warm and dry. Coloration: Skin is not pale. Findings: No lesion or rash. Nails: There is no clubbing. Neurological:      Mental Status: She is alert and oriented to person, place, and time. Motor: No weakness or tremor. Coordination: Coordination normal.      Deep Tendon Reflexes: Reflexes are normal and symmetric. Psychiatric:         Attention and Perception: Attention normal.         Mood and Affect: Mood normal.         Speech: Speech normal.         Behavior: Behavior normal. Behavior is cooperative. Thought Content: Thought content normal.         Cognition and Memory: Cognition and memory normal.         Judgment: Judgment normal.        /72 (Site: Left Upper Arm, Position: Sitting, Cuff Size: Medium Adult)   Pulse 92   Wt 200 lb 9.6 oz (91 kg)   SpO2 98%   BMI 36.69 kg/m²      ASSESSMENT:     Diagnosis Orders   1. Type 2 diabetes mellitus with other specified complication, without long-term current use of insulin (HCC)good control but appears to be having more problems with blood sugars lately. Dulaglutide (TRULICITY) 8.30 KU/5.4VT SOPN    Dulaglutide 1.5 MG/0.5ML SOPN    Insulin Pen Needle (B-D UF III MINI PEN NEEDLES) 31G X 5 MM MISC   2. Essential hypertensionwell controlled    3. Hyperlipidemia, unspecified hyperlipidemia typeon therapy    4. Hypothyroidism, unspecified typeno control levothyroxine (SYNTHROID) 175 MCG tablet   5. Screening mammogram, encounter for  [facility] DIGITAL SCREENING AUGMENTED BILATERAL   6. Need for shingles vaccine  Zoster recombinant [facility])        PLAN:    1. Discontinue Januvia. We will add a GLP-1 to see if help with weight loss. Recheck blood work in 3 months. Monofilament test.  2.  Continue medication  3. Continue medication  4. Increase Synthroid to next dose and recheck in 3 months. 5.  Mammography at [place]  6. Shingles vaccine.   Follow-up 3 months

## 2021-02-22 PROCEDURE — 90750 HZV VACC RECOMBINANT IM: CPT | Performed by: FAMILY MEDICINE

## 2021-02-22 PROCEDURE — 90471 IMMUNIZATION ADMIN: CPT | Performed by: FAMILY MEDICINE

## 2021-03-07 DIAGNOSIS — E11.69 TYPE 2 DIABETES MELLITUS WITH OTHER SPECIFIED COMPLICATION, WITHOUT LONG-TERM CURRENT USE OF INSULIN (HCC): ICD-10-CM

## 2021-03-08 RX ORDER — DULAGLUTIDE 0.75 MG/.5ML
0.75 INJECTION, SOLUTION SUBCUTANEOUS WEEKLY
Qty: 2 PEN | Refills: 5 | Status: SHIPPED | OUTPATIENT
Start: 2021-03-08 | End: 2021-05-12 | Stop reason: SDUPTHER

## 2021-03-19 ENCOUNTER — TELEPHONE (OUTPATIENT)
Dept: FAMILY MEDICINE CLINIC | Age: 56
End: 2021-03-19

## 2021-03-19 DIAGNOSIS — F41.8 DEPRESSION WITH ANXIETY: Primary | ICD-10-CM

## 2021-03-24 ENCOUNTER — TELEPHONE (OUTPATIENT)
Dept: FAMILY MEDICINE CLINIC | Age: 56
End: 2021-03-24

## 2021-03-24 NOTE — TELEPHONE ENCOUNTER
Anne, I see this has come back to me. Please see the notes on 3/19/21 and the referral that is entered.

## 2021-03-25 ENCOUNTER — OFFICE VISIT (OUTPATIENT)
Dept: FAMILY MEDICINE CLINIC | Age: 56
End: 2021-03-25
Payer: COMMERCIAL

## 2021-03-25 VITALS
WEIGHT: 190.4 LBS | HEART RATE: 114 BPM | OXYGEN SATURATION: 96 % | SYSTOLIC BLOOD PRESSURE: 124 MMHG | BODY MASS INDEX: 34.82 KG/M2 | DIASTOLIC BLOOD PRESSURE: 68 MMHG

## 2021-03-25 DIAGNOSIS — R42 VERTIGO: Primary | ICD-10-CM

## 2021-03-25 PROCEDURE — 99213 OFFICE O/P EST LOW 20 MIN: CPT | Performed by: FAMILY MEDICINE

## 2021-03-25 RX ORDER — MECLIZINE HYDROCHLORIDE 25 MG/1
25 TABLET ORAL 3 TIMES DAILY PRN
Qty: 30 TABLET | Refills: 0 | Status: SHIPPED | OUTPATIENT
Start: 2021-03-25 | End: 2021-04-04

## 2021-03-25 ASSESSMENT — ENCOUNTER SYMPTOMS
GASTROINTESTINAL NEGATIVE: 1
ALLERGIC/IMMUNOLOGIC NEGATIVE: 1
RESPIRATORY NEGATIVE: 1
EYES NEGATIVE: 1

## 2021-03-25 NOTE — PROGRESS NOTES
SUBJECTIVE:    Patient ID: Foreign Kaur is a 60 y. o.female. HPI:   Patient here for evaluation of vertigo  Patient exam 47-year-old white female. She have recurrent episodes of vertigo that are severe. Vertigo seems to be more frequent when she have increased stress. Her mother is in hospice now. She had missed a couple days of work secondary to vertigo and would like to have some [facility] papers filled out. She has been taken Dramamine. Dramamine is helpful but sedating. Past Medical History:   Diagnosis Date    Benign essential tremor     Diabetes mellitus type II     HTN (hypertension)     Hyperlipidemia     Hypothyroidism     Syncope     Type II or unspecified type diabetes mellitus without mention of complication, not stated as uncontrolled       Current Outpatient Medications   Medication Sig Dispense Refill    meclizine (ANTIVERT) 25 MG tablet Take 1 tablet by mouth 3 times daily as needed for Dizziness 30 tablet 0    TRULICITY 2.17 OF/4.3MZ SOPN INJECT 0.75 MG INTO THE SKIN ONCE A WEEK 2 pen 5    levothyroxine (SYNTHROID) 175 MCG tablet Take 1 tablet by mouth Daily 90 tablet 3    Dulaglutide 1.5 MG/0.5ML SOPN Inject 1.5 mg into the skin once a week 1 pen 5    Insulin Pen Needle (B-D UF III MINI PEN NEEDLES) 31G X 5 MM MISC 1 each by Does not apply route daily 100 each 3    ondansetron (ZOFRAN) 4 MG tablet Take 1 tablet by mouth 3 times daily as needed for Nausea or Vomiting 30 tablet 0    losartan-hydroCHLOROthiazide (HYZAAR) 50-12.5 MG per tablet TAKE 1 TABLET BY MOUTH EVERY DAY 30 tablet 5    escitalopram (LEXAPRO) 5 MG tablet Take 1 tablet by mouth daily 90 tablet 3    atorvastatin (LIPITOR) 20 MG tablet TAKE 1 TABLET DAILY 90 tablet 3    metoprolol tartrate (LOPRESSOR) 25 MG tablet TAKE 1 TABLET TWICE A  tablet 3    vitamin B-12 (CYANOCOBALAMIN) 500 MCG tablet Take 500 mcg by mouth daily.  aspirin 81 MG EC tablet Take 81 mg by mouth daily.          No current facility-administered medications for this visit. Allergies   Allergen Reactions    Metformin And Related Nausea And Vomiting    Codeine     Darvocet [Propoxyphene N-Acetaminophen]     Inderal [Propranolol Hcl]     Morphine Rash       Review of Systems   Constitutional: Negative. HENT: Negative. Eyes: Negative. Respiratory: Negative. Cardiovascular: Negative. Gastrointestinal: Negative. Endocrine: Negative. Genitourinary: Negative. Musculoskeletal: Negative. Skin: Negative. Allergic/Immunologic: Negative. Neurological: Positive for dizziness. Hematological: Negative. Psychiatric/Behavioral: Negative. OBJECTIVE:    Physical Exam  Constitutional:       Appearance: Normal appearance. She is well-developed and well-groomed. HENT:      Head: Normocephalic and atraumatic. Right Ear: Tympanic membrane, ear canal and external ear normal. There is no impacted cerumen. Left Ear: Tympanic membrane, ear canal and external ear normal. There is no impacted cerumen. Nose: Nose normal.      Mouth/Throat:      Lips: Pink. Mouth: Mucous membranes are moist.      Dentition: Normal dentition. Pharynx: Oropharynx is clear. Uvula midline. Eyes:      General: Lids are normal.         Right eye: No discharge. Left eye: No discharge. Extraocular Movements: Extraocular movements intact. Conjunctiva/sclera: Conjunctivae normal.      Right eye: Right conjunctiva is not injected. Left eye: Left conjunctiva is not injected. Pupils: Pupils are equal, round, and reactive to light. Neck:      Musculoskeletal: Full passive range of motion without pain, normal range of motion and neck supple. Thyroid: No thyromegaly. Vascular: No carotid bruit or JVD. Cardiovascular:      Rate and Rhythm: Normal rate and regular rhythm. Pulses: Normal pulses. Carotid pulses are 2+ on the right side and 2+ on the left side. Radial pulses are 2+ on the right side and 2+ on the left side. Heart sounds: Normal heart sounds, S1 normal and S2 normal. No murmur. Pulmonary:      Effort: Pulmonary effort is normal.      Breath sounds: Normal breath sounds. Abdominal:      General: Bowel sounds are normal. There is no distension or abdominal bruit. Palpations: Abdomen is soft. There is no mass. Hernia: No hernia is present. Musculoskeletal: Normal range of motion. Right lower leg: No edema. Left lower leg: No edema. Lymphadenopathy:      Cervical: No cervical adenopathy. Right cervical: No superficial cervical adenopathy. Left cervical: No superficial cervical adenopathy. Upper Body:      Right upper body: No supraclavicular adenopathy. Left upper body: No supraclavicular adenopathy. Skin:     General: Skin is warm and dry. Coloration: Skin is not pale. Findings: No lesion or rash. Nails: There is no clubbing. Neurological:      Mental Status: She is alert and oriented to person, place, and time. Motor: No weakness or tremor. Coordination: Coordination normal.      Deep Tendon Reflexes: Reflexes are normal and symmetric. Psychiatric:         Attention and Perception: Attention normal.         Mood and Affect: Mood normal.         Speech: Speech normal.         Behavior: Behavior normal. Behavior is cooperative. Thought Content: Thought content normal.         Cognition and Memory: Cognition and memory normal.         Judgment: Judgment normal.        /68 (Site: Left Upper Arm, Position: Sitting, Cuff Size: Medium Adult)   Pulse 114   Wt 190 lb 6.4 oz (86.4 kg)   SpO2 96%   BMI 34.82 kg/m²      ASSESSMENT:     Diagnosis Orders   1. Vertigorecurrent meclizine (ANTIVERT) 25 MG tablet        PLAN:    1. FMLA paper filled out today. We will prescribe meclizine to use as needed. Follow-up in 1 to 3 months.

## 2021-05-12 DIAGNOSIS — E11.69 TYPE 2 DIABETES MELLITUS WITH OTHER SPECIFIED COMPLICATION, WITHOUT LONG-TERM CURRENT USE OF INSULIN (HCC): ICD-10-CM

## 2021-05-12 RX ORDER — DULAGLUTIDE 0.75 MG/.5ML
0.75 INJECTION, SOLUTION SUBCUTANEOUS WEEKLY
Qty: 12 PEN | Refills: 3 | Status: SHIPPED | OUTPATIENT
Start: 2021-05-12 | End: 2021-05-18 | Stop reason: DRUGHIGH

## 2021-05-18 DIAGNOSIS — E11.69 TYPE 2 DIABETES MELLITUS WITH OTHER SPECIFIED COMPLICATION, WITHOUT LONG-TERM CURRENT USE OF INSULIN (HCC): ICD-10-CM

## 2021-07-14 ENCOUNTER — OFFICE VISIT (OUTPATIENT)
Dept: PSYCHIATRY | Age: 56
End: 2021-07-14
Payer: COMMERCIAL

## 2021-07-14 DIAGNOSIS — F41.9 ANXIETY: Primary | ICD-10-CM

## 2021-07-14 DIAGNOSIS — F32.A DEPRESSIVE DISORDER: ICD-10-CM

## 2021-07-14 DIAGNOSIS — F43.21 COMPLICATED GRIEF: ICD-10-CM

## 2021-07-14 PROCEDURE — 90791 PSYCH DIAGNOSTIC EVALUATION: CPT | Performed by: SOCIAL WORKER

## 2021-07-14 NOTE — PROGRESS NOTES
Behavioral Health Consultation  Medardo Jessica, PEPE   2021  10:30      Time spent with Patient: 60 minutes  This is patient's first  [facility] appointment. Reason for Consult:    Chief Complaint   Patient presents with    Stress    Other    Anxiety    Depression     Referring Provider: ROMIE Mckenzie,  [address]      Feedback given to PCP. S:  Loss/Grief - 6 people .  first, then brother 2 years later. Mother , other siblings. Relationship - of 4 years - ghosted      Patient presents with concerns about Stress, grief, sleep issues. Sx have been present for 6 years ago since  passed away  (weeks, months, years). Sx are aggravated by work [employer] overwhelmed sometimes . Patient finds relief from \"nothing really\" . Goals for treatment include \"to be able to function\"  Not be overwhelmed. Patient works as a maintenance . Daily routine is work related schedule - varies dependent upon how many hours on a day pt is working. Patient describes poor sleep pattern. \"mind so wound up\"     Sleep issues are partially due to her work schedule pt says When working \"7 12 hour shifts scared I wouldn't wake up\" so pt would stay awake and go to work with little to no sleep.    Still working at this time - that's all I do       O:  MSE:    Appearance   alert, cooperative, smiling  Appetite normal  Sleep disturbance Yes  Fatigue Yes  Loss of pleasure Yes  Impulsive behavior No  Speech    spontaneous, normal rate and normal volume  Mood    Euthymic in presentation   Affect    normal affect  Thought Content    intrusive thoughts  Thought Process    coherent and circumstantial  Associations    logical connections  Insight    Good  Judgment    Intact  Orientation    oriented to person, place, time, and general circumstances  Memory    recent and remote memory intact  Attention/Concentration    intact  Morbid ideation No  Suicide Assessment    no suicidal ideation      History:    Medications:   Current Outpatient Medications   Medication Sig Dispense Refill    Dulaglutide 1.5 MG/0.5ML SOPN Inject 1.5 mg into the skin once a week 12 pen 3    levothyroxine (SYNTHROID) 175 MCG tablet Take 1 tablet by mouth Daily 90 tablet 3    Insulin Pen Needle (B-D UF III MINI PEN NEEDLES) 31G X 5 MM MISC 1 each by Does not apply route daily 100 each 3    ondansetron (ZOFRAN) 4 MG tablet Take 1 tablet by mouth 3 times daily as needed for Nausea or Vomiting 30 tablet 0    losartan-hydroCHLOROthiazide (HYZAAR) 50-12.5 MG per tablet TAKE 1 TABLET BY MOUTH EVERY DAY 30 tablet 5    escitalopram (LEXAPRO) 5 MG tablet Take 1 tablet by mouth daily 90 tablet 3    atorvastatin (LIPITOR) 20 MG tablet TAKE 1 TABLET DAILY 90 tablet 3    metoprolol tartrate (LOPRESSOR) 25 MG tablet TAKE 1 TABLET TWICE A  tablet 3    vitamin B-12 (CYANOCOBALAMIN) 500 MCG tablet Take 500 mcg by mouth daily.  aspirin 81 MG EC tablet Take 81 mg by mouth daily. No current facility-administered medications for this visit. Social History:   Social History     Socioeconomic History    Marital status:      Spouse name: Not on file    Number of children: Not on file    Years of education: Not on file    Highest education level: Not on file   Occupational History    Not on file   Tobacco Use    Smoking status: Never Smoker    Smokeless tobacco: Never Used   Substance and Sexual Activity    Alcohol use: No    Drug use: No    Sexual activity: Not on file   Other Topics Concern    Not on file   Social History Narrative    Not on file     Social Determinants of Health     Financial Resource Strain:     Difficulty of Paying Living Expenses:    Food Insecurity:     Worried About Running Out of Food in the Last Year:     [address] in the Last Year:    Transportation Needs:     Lack of Transportation (Medical):      Lack of Transportation (Non-Medical):    Physical Activity:     Days of Exercise per Week:     Minutes of Exercise per Session:    Stress:     Feeling of Stress :    Social Connections:     Frequency of Communication with Friends and Family:     Frequency of Social Gatherings with Friends and Family:     Attends Judaism Services:     Active Member of Clubs or Organizations:     Attends Club or Organization Meetings:     Marital Status:    Intimate Partner Violence:     Fear of Current or Ex-Partner:     Emotionally Abused:     Physically Abused:     Sexually Abused:        TOBACCO:   reports that she has never smoked. She has never used smokeless tobacco.  ETOH:   reports no history of alcohol use. Family History:   Family History   Problem Relation Age of Onset    Coronary Art Dis Mother         quadruple bypass    Heart Disease Mother     Heart Attack Father          of   Marcin Rudder Coronary Art Dis Father          of MI after having a couple of other ones    Esophageal Cancer Father     Diabetes Sister     High Cholesterol Sister     Other Sister         cerebrovascular dz    Cancer Sister         passed away from cancer that started out as breast  cancer and spread    Coronary Art Dis Brother         \"half of heart is dead\"    Diabetes Brother     High Cholesterol Brother     Colon Polyps Brother     Diabetes Maternal Grandmother     High Cholesterol Sister     High Cholesterol Brother     Colon Cancer Neg Hx     Liver Cancer Neg Hx     Liver Disease Neg Hx     Rectal Cancer Neg Hx     Stomach Cancer Neg Hx          A:  Pt appears to be experiencing complicated grief, with depressed and anxious mood. She has experienced significant death losses of many people in her family including her .   [facility] provided pt with psycho education on the benefits of utilizing [facility] services to address presenting problems in order to work toward improvements in patient's overall health and wellness as well as her quality of life. Sleep problems are pretty significant, so is anxious distress, grief and depression. [facility] provided pt with some psycho education on sleep hygiene today, stress management and ways to reduce negative thoughts about self and self concept. We are going to discuss sleep issues further at next appointment if they are continuing to be a problem. Pt is not at risk of harm to herself or others. Depression is linked to grief. We may identify that pt would benefit from being seen by specialty outpatient counseling for all identified problems discussed in session if pt is open to this. Diagnosis:    Depressive disorder Not Otherwise Specified  Generalized anxiety disorder  Additional conditions which may be the focus of clinical attention:  Bereavement, Complicated Grief       Diagnosis Date    Benign essential tremor     Diabetes mellitus type II     HTN (hypertension)     Hyperlipidemia     Hypothyroidism     Syncope     Type II or unspecified type diabetes mellitus without mention of complication, not stated as uncontrolled      Problems with primary support group, Problems related to the social environment, Occupational problems and Other psychosocial and environmental problems      Plan:  Pt interventions:  Established rapport, Conducted functional assessment, [place]-setting to identify pt's primary goals for [facility] visit / overall health, Supportive techniques, Emphasized self-care as important for managing overall health and Provided Psychoeducation re: Sleep Hygiene/Insomnia problems in session and will discuss further at next appointment.     Stress management psycho education and Optimism strategies     Pt Behavioral Change Plan:  See pt instructions

## 2021-07-14 NOTE — PATIENT INSTRUCTIONS
STRESS MANAGEMENT STRATEGIES    1. Recognize Stress:  Learning to recognize when your body is reacting to stress and identifying our stressors are the first steps in managing stress. 2. Take a Break:  A change of pace, no matter how short, gives us a new outlook on old problems. Take a vacation 20 minutes a day  enjoy a change from the daily routine. 3. Learn to Relax:  Under stress, the muscles in our bodies stay tight. One of the most effective ways to combat tensions is deep muscle relaxation. Other techniques that produce muscle and mental relaxation are yoga, prayer, and deep breathing. 4. Be Nutritionally Aware:  Good nutrition is vital to optimum health, and is especially critical when we are under unusual stress, or going through a major life change. 5. Exercise Regularly:  Just like nutrition, exercise is imperative for maintaining good fitness. Whatever you enjoy  swimming, walking, jogging, aerobic exercise  will help you let off steam and work out stress. 6. Plan your Work:  Tension and anxiety really build up when our work seems endless. Plan your work to use time and energy more efficiently. Take one thing at a time. 7. Talk it Over: This may be the most important thing you can do for yourself if you cant get a handle on things. Find a good listener. Just as a pressure relief valve allows steam to flow out of a pressure cooker and keeps it from blowing up, so talking allows stress to flow out of the body and keeps us from blowing up. 8. Accept What You Cannot Change:  If the problem is beyond your control at this time, try your best to accept it until you can change it. It beats spinning your wheels and getting nowhere. 9. Evaluate Your Perceptions:  What we think is sometimes what we feel. If we constantly think unrealistic or alarming thoughts about ourselves or other folks, then our stress level is increased.      10. Relax Unrealistic Standards:  When we set unrealistic standards for ourselves, we usually can never reach them. If we do, we burn out quickly. Set reasonable goals and standards. 11. Reward Yourself:  Find ways to reward yourself when youve completed a minor or major task. We cannot always depend on others to recognize us, so we must develop our own reward system. 12. Become Assertive: Take steps to solve problems instead of feeling helpless. Distinguishing assertiveness (respecting others rights and your rights) from aggressiveness and passivity can do much to resolve internal stress. 13. Rediscover Humor:  Learn to laugh at yourself and your situation! 14. Increase Pleasurable Activities:  Take time to participate in fun, pleasurable, activities on a regular basis. The Best Possible Self  The Best Possible Self (BPS) exercise can be used to increase optimism. The BPS requires people to envision themselves in an imaginary future in which everything has turned out in the most optimal way. Over the past years, writing about and imagining a BPS has repeatedly been demonstrated to increase peoples mood and well-being Katharine Almieda, 2001; Debbi Hinojosa al., 2010; Bozena Fitch, 2006). Messi Chong et al. (2010) provided evidence that writing about and imagining a BPS can also increase optimism in terms of expecting favorable outcomes. This effect was independent of the effect on mood that was simultaneously increased by the manipulation. Goal  The BPS exercise can be used to increase optimism in terms of expecting favorable outcomes (see for instance Jackson et al., 2011). Advice  While in most cases the exercise is used in a written form, it is also possible to ask clients to make drawings of their best possible self. The most powerful way to use the exercise is by instructing clients to visualize their best possible self on a daily basis. Tool Description  1.  Set a timer or stopwatch for 10 minutes, during this time you are to think about your best possible future self and to write it down on paper. 2. Imagine your life the way you always imagined it would be like, your best possible self. Picture that you have performed to the best of your abilities and you had achieved the things you wanted to in life. 3. While writing, dont worry about grammar or punctuation just focus on writing all your thoughts and emotions in an expressive way. You may want to have several sheets of paper for this exercise. 4. Reflection: after completing the initial exercise, you must reflect on your feelings and answer. Think about the following questions: What effects did this exercise have?  Does this exercise affect you more emotionally or does it affect your current self-image?  Did it motivate or inspire you?  Does it make you want to make changes?  How did this exercise affect you overall?

## 2021-07-28 ENCOUNTER — OFFICE VISIT (OUTPATIENT)
Dept: PSYCHIATRY | Age: 56
End: 2021-07-28
Payer: COMMERCIAL

## 2021-07-28 DIAGNOSIS — F43.21 COMPLICATED GRIEF: ICD-10-CM

## 2021-07-28 DIAGNOSIS — F32.A DEPRESSIVE DISORDER: ICD-10-CM

## 2021-07-28 DIAGNOSIS — F41.9 ANXIETY: Primary | ICD-10-CM

## 2021-07-28 PROCEDURE — 90837 PSYTX W PT 60 MINUTES: CPT | Performed by: SOCIAL WORKER

## 2021-07-28 NOTE — PROGRESS NOTES
Behavioral Health Consultation  Halle Peng, LCSW   07/28/2021  13:58      Time spent with Patient: 67 minutes  This is patient's second  [facility] appointment. Reason for Consult:    Chief Complaint   Patient presents with    Anxiety    Stress    Other     Referring Provider: Sawyer Reyna MD  [address], [place] [id number]      Feedback given to PCP. S:  Discussed occurrences since last session. Reassured pt   Pt shared that her SO texted her recently. \"I didn't know what to do. \"  Discussed at length. Discussed what she wants in a relationship right now in life. Described some concerns related to stress/worry  Wakes up dizzy -hx of dizzyness. Arms unable to use when in [place] due to getting panicky.      O:  MSE:    Appearance    alert, cooperative, smiling  Appetite normal  Sleep disturbance Yes  Fatigue No  Loss of pleasure No  Impulsive behavior No  Speech    spontaneous, normal rate and normal volume  Mood    Euthymic in presentation  Affect    normal affect  Thought Content    intact  Thought Process    coherent and circumstantial  Associations    logical connections  Insight    Good  Judgment    Intact  Orientation    oriented to person, place, time, and general circumstances  Memory    recent and remote memory intact  Attention/Concentration    intact  Morbid ideation No  Suicide Assessment    no suicidal ideation      History:    Medications:   Current Outpatient Medications   Medication Sig Dispense Refill    Dulaglutide 1.5 MG/0.5ML SOPN Inject 1.5 mg into the skin once a week 12 pen 3    levothyroxine (SYNTHROID) 175 MCG tablet Take 1 tablet by mouth Daily 90 tablet 3    Insulin Pen Needle (B-D UF III MINI PEN NEEDLES) 31G X 5 MM MISC 1 each by Does not apply route daily 100 each 3    ondansetron (ZOFRAN) 4 MG tablet Take 1 tablet by mouth 3 times daily as needed for Nausea or Vomiting 30 tablet 0    losartan-hydroCHLOROthiazide (HYZAAR) 50-12.5 MG per tablet TAKE 1 TABLET BY MOUTH EVERY DAY 30 tablet 5    escitalopram (LEXAPRO) 5 MG tablet Take 1 tablet by mouth daily 90 tablet 3    atorvastatin (LIPITOR) 20 MG tablet TAKE 1 TABLET DAILY 90 tablet 3    metoprolol tartrate (LOPRESSOR) 25 MG tablet TAKE 1 TABLET TWICE A  tablet 3    vitamin B-12 (CYANOCOBALAMIN) 500 MCG tablet Take 500 mcg by mouth daily.  aspirin 81 MG EC tablet Take 81 mg by mouth daily. No current facility-administered medications for this visit. Social History:   Social History     Socioeconomic History    Marital status:      Spouse name: Not on file    Number of children: Not on file    Years of education: Not on file    Highest education level: Not on file   Occupational History    Not on file   Tobacco Use    Smoking status: Never Smoker    Smokeless tobacco: Never Used   Substance and Sexual Activity    Alcohol use: No    Drug use: No    Sexual activity: Not on file   Other Topics Concern    Not on file   Social History Narrative    Not on file     Social Determinants of Health     Financial Resource Strain:     Difficulty of Paying Living Expenses:    Food Insecurity:     Worried About Running Out of Food in the Last Year:     [address] in the Last Year:    Transportation Needs:     Lack of Transportation (Medical):      Lack of Transportation (Non-Medical):    Physical Activity:     Days of Exercise per Week:     Minutes of Exercise per Session:    Stress:     Feeling of Stress :    Social Connections:     Frequency of Communication with Friends and Family:     Frequency of Social Gatherings with Friends and Family:     Attends Evangelical Services:     Active Member of Clubs or Organizations:     Attends Club or Organization Meetings:     Marital Status:    Intimate Partner Violence:     Fear of Current or Ex-Partner:     Emotionally Abused:     Physically Abused:     Sexually Abused:        TOBACCO:   reports that she has never smoked. She has never used smokeless tobacco.  ETOH:   reports no history of alcohol use. Family History:   Family History   Problem Relation Age of Onset    Coronary Art Dis Mother         quadruple bypass    Heart Disease Mother     Heart Attack Father          of   Jannell Outhouse Coronary Art Dis Father          of MI after having a couple of other ones    Esophageal Cancer Father     Diabetes Sister     High Cholesterol Sister     Other Sister         cerebrovascular dz    Cancer Sister         passed away from cancer that started out as breast  cancer and spread    Coronary Art Dis Brother         \"half of heart is dead\"    Diabetes Brother     High Cholesterol Brother     Colon Polyps Brother     Diabetes Maternal Grandmother     High Cholesterol Sister     High Cholesterol Brother     Colon Cancer Neg Hx     Liver Cancer Neg Hx     Liver Disease Neg Hx     Rectal Cancer Neg Hx     Stomach Cancer Neg Hx          A:  Pt presents with mild distress intolerance with stress and anxious distress that is possibly reactive to her relationship with her SO who disappeared and recently without discussing with her. She has recently been told by his mother that he said he was going to have a mental break down if he didn't leave. Pt described hx of SO's presentation with her included a considerable amount of alcohol consumption, and he has been  several times and blames problems on each of the women he was  to. Pt processed this in session with [facility]. She would like to be in a healthy relationship. She works a lot of hours, and has a supportive family. She is not at risk of harm to herself or others. [facility] provided pt with psycho education on quality of life, stress management, and Self Care. She is not at risk of harm to herself or others. Provided psycho education on  healthy relationships and healthy communication skills as well as grief tips.   Pt believes she continues to grieve the losses of all of the family members and  who  \"I don't think I've had time to really process it all\".      Diagnosis:    Depressive disorder Not Otherwise Specified  Generalized anxiety disorder  Additional conditions which may be the focus of clinical attention:  Complicated grief      Diagnosis Date    Benign essential tremor     Diabetes mellitus type II     HTN (hypertension)     Hyperlipidemia     Hypothyroidism     Syncope     Type II or unspecified type diabetes mellitus without mention of complication, not stated as uncontrolled      Problems with primary support group and Other psychosocial and environmental problems      Plan:  Pt interventions:  Provided handout on  Grief Tips, Provided education, Discussed self-care (sleep, nutrition, rewarding activities, social support, exercise), Established rapport and Supportive techniques      Pt Behavioral Change Plan:  See pt instructions

## 2021-07-28 NOTE — PATIENT INSTRUCTIONS
1.  Return in 2 Weeks as scheduled. 2.  Call Mathwe Albert if you need to come in earlier or reschedule at [phone]       Grief Tips  Help for Those Who Mourn    The following are many ideas to help people who are mourning a loved ones death. Different kinds of losses dictate different responses, so not all of these ideas will suit everyone. Likewise, no two people grieve alike  what works for one may not work for another. Treat this list for what it is; a gathering of assorted suggestions that various people have tried with success. Perhaps what helped them will help you. The emphasis here is on specific, practical ideas. 1. Talk regularly with a friend. Talking with another about what you think and feel is one of the best things you can do for yourself. It helps relieve some of the pressure you may feel, it can give you a sense of perspective, and it keeps you in touch with others. Look for someone whos a good listener and who has a caring soul. Then speak whats on your mind and in your heart. If this feels one-sided let that be okay for this period of your life. Chances are the other person will find meaning in what theyre doing, and time will come when youll have the chance to be a good listener for someone else. Youll be a better listener then, if youre a good talker now. 2. Walk. Go for walks outside every day if you can. Dont overdo it, but walk briskly enough that it feels invigorating. Sometimes try walking slowly enough so you can look carefully at what you see. Observe what nature has to offer you, what it can teach you. Enjoy as much as you are able to of the sights and sounds that come your way. If you like, walk with another person. 3. Carry or wear a linking object. Carry something in your pocket or purse that reminds you of the one who   a keepsake they gave you perhaps, or small object they once carried or used or a memento you select for just this purpose. You might wear a piece of their jewelry in the same way. Whenever you want, reach for gaze upon this object and remember what it signifies. 4. Visit the grave. Not all people prefer to do this. But if it feels right to you, then do so. Dont let others convince you this is a morbid thing to do. Spend whatever time feels right there. Stand or sit in the quietness and do what comes naturally: be silent or talk, breathe deeply or cry, recollect or pray. You may wish to add your distinctive touch to the gravesite  straighten it a bit, or add little signs of your love. 5. Create a memory book. Compile photographs, which document your loved ones life. Arrange them into some sort of order so they tell a story. Add other elements if you want: diplomas, newspaper clippings, awards, accomplishments, and reminders of significant events. Put all this in a special binder and keep it for other people to look at if they wish. Go through it on your own if you desire. Reminisce as you do so. 6. Recall your dreams. Your dreams often have important things to say about your feelings and about your relationship with the one who . Your dreams may be scary or sad, especially early on. They may seem weird or crazy to you. You may find that your loved one appears in your dreams. Accept your dreams for what they are and see what you can learn from them. No one knows that better than you. 7. Tell people what helps you and what doesnt. People around you may not understand what you need. So tell them. If hearing your loved ones name spoken aloud by others feels good, say so. If you need more time alone, or assistance with chores youre unable to complete, or an occasional hug, be honest.  People cant read your mind, so youll have to speak it. 8. Write things down.   Most people who are grieving become more forgetful than usual.  So help yourself remember what you want by keeping track of it on paper or with whatever system works best for you. This may include writing down things you want to preserve about the person who has . 9. Ask for a copy of the [facility]. If the  liturgy or memorial service holds special meaning for you because of what was spoken or read, ask for the words. Whoever participated in that ritual will feel gratified that what they prepared was appreciated. Turn to these words whenever you want. Some people find these thoughts provide even more help weeks and months after the service. 10. Remember the serenity prayer. This prayer is attributed to theologian Shari Dyson, but its actually an ancient  [facility] [address] It has brought comfort and support to many that have suffered various kinds of afflictions. 11. Create a memory area at home. In a space that feels appropriate, arrange a small table that honors the person: a framed photograph or two, perhaps a prized possession or award or something they created or something they loved. This might be placed on a small table, a mantel or a desk. Some people like to use a grouping of candles, representing not just the person who  but others who have  as well. In that case a variety of candles can be arranged each representing a unique life. 12. Drink water. Grieving people can easily become dehydrated. Crying can naturally lead to that. And with your normal routines turned upside down, you may simply not drink as much or as regularly as you did before this death. Make this a way you care for yourself. 13. Use your hands. Sometimes theres value in doing repetitive things with your hands, something you dont have to think about very much because it becomes second nature. Knitting and crocheting are like that. So are carving, woodworking, polishing, solving jigsaw puzzles, painting, braiding, shoveling, washing and countless other activities.     15. Give yourself respites from your grief.  Just because youre grieving doesnt mean you must always be feeling sad or     forlorn. Theres value in sometimes consciously deciding that youll think about something else for awhile, or that youll do something youve always enjoyed doing. Sometimes this happens naturally and its only later you realize that your grief has taken a back seat. Let it, this is not an indication you love that person any less or that youre forgetting them. Its a sign that youre human and you need relief from the unrelenting pressure. It can also be a healthy sign youre healing. 15. See a grief counselor. If youre concerned about how youre feeling and how well youre adapting make an appointment   with a counselor who specializes in grief. Often youll learn what you need both about grief and about yourself as a griever in only a few sessions. Ask questions of the counselor before you sign on. What specific training does he or she have? What accreditation? A person who is a family therapist or a psychologist doesnt necessarily understand the unique issues of someone in grief. 12. Begin your day with your loved one. If your grief is young, youll probably wake up thinking of that person anyway. So why not decide that youll include her or him from the start? Focus this time in a positive way. Bring to your mind fulfilling memories. Recall lessons that this person taught you, gifts he or she gave you. Think about how you can spend your day in ways that would be keeping in with your loved ones best self and with your best self. Then carry that best self with you through your day. 16. Invite some one to be your telephone viola. If your grief and sadness hit you especially hard at times and you have no   one nearby to turn to, ask someone you trust to be your telephone viola. Ask their permission for you to call them whenever you feel youre at loose ends, day or night.   Then put their number beside your phone and call them if you need them. Dont abuse the privilege, of course. And covenant that some day it will be payback time  someday youll make yourself available to help someone else in the same way youve been helped. That will help you accept the care youre receiving. 18. Avoid certain people if you must.  No one likes to be unfriendly or cold. But if there are people in your life who make it  very difficult for you to do your grieving then do what you can to stay out of their way. Some people may lecture you or belittle you. 23. Donate their possessions meaningfully. Whether you give your loved ones personal possessions to someone you know   or to a stranger, find ways to pass these things along so that others might benefit from them. Family members of friends might like to receive keepsakes. They or others might deserve tools, utensils, books or sporting equipment. [employer] organizations can put clothes to good use. Some wish to do this quickly following the death, while others wish to wait awhile. 21. Donate in the others name. Honor the [employer] and spirit by giving a gift or gifts to a cause the other would   appreciate. A favorite brenda? A local ? A building project? Extend that persons influence even further. 21. Create or commission a memory quilt. Sew or invite others to sew with you, or hire someone to sew for you. However you get it completed, put together a wall hanging or a bedroom quilt that remembers the important life events of the one who . Take your time doing this. Make it what it is, a labor of love. 22. Take a yoga class. People of almost any age can do yoga. More than conditioning your body, it helps you relax and focus your mind. It can be woven into a practice of mediation. Its a gentle art for that time in your life when you deserve gentleness all around you. 23. Connect on the Internet.   If youre computer savvy, search the Internet. Joselyn Baker find many resources for people in grief,   as well as the opportunity to chat with fellow grievers. You can link up with others without leaving your home. Joselyn Baker also find more to expand your horizons as a person who is beginning to grow. 24. Speak to a cleargyperson. If youre searching for answers to the larger questions about life and death, Catholic and spirituality, consider talking with a representative of your axel, or even anothers axel. Consider becoming a spiritual friend with another and making your time of grieving a time of personal exploring. Read of how others have responded to a loved ones death. You may feel that your own grief is all you can handle. But if youd like to look at the ways others have done it, try:  Beyond Grief:  A Guide for Recovering from the Death of a Loved One by Jerry 40 by Tang Walls and Ana Chadwick  The Grief Recovery Handbook: The Action Program for Moving Beyond Death, Divorce, & Other Losses by Concha Ba   A Grief Observed by Derrek Potter  by Devin Cox When Good-Bye Is Forever by Yoko Ramsey  Men and Grief by Connie Oropeza or [address] for a Son. There are many others. Check with a . 22. Learn about your loved one from others. Listen to the stories others have to tell about the one, who , stories youre familiar with and those youve never heard before. Spend time with their friends, schoolmates or colleagues. Invite them into your home. Solicit the writings of others. Preserve whatever you find out. Celebrate your time together. 26. Take a day off. When the mood is just right, take a one-day vacation. Do whatever you want, or dont do whatever you want. Travel somewhere or stay inside by yourself. Be very active or dont do anything at all.   Just make it your day, whatever that means for you.      27. Invite someone to give you feedback. Select someone you trust, preferably someone familiar with the working of grief, to give you his or her reaction when you ask for it. If you want to check out how clearly youre thinking, how accurately youre remembering, how effectively youre coping, go to that person. Pose your questions, then listen to their responses. What you choose to do with that information will be up to you. 28. Vent your anger rather than hold it in. You may feel awkward being angry when youre grieving, but anger is a common reaction. The expression holds true: anger is best out floating rather than in bloating. Even if you feel a bit ashamed as you do it, find ways to get it out of your system. Steele, even if its in an empty house. Cry. Hit something soft. Throw eggs at something hard. Vacuum up a storm. Resist the temptation to be proper. 29. Give thanks every day. Whatever has happened to you, you still have things to be thankful for. Perhaps its your memories, your remaining family, your support, your work; you own health  all sorts of things. Draw your attention to those parts of life that are worth appreciating, then appreciate them. 30. Monitor signs of dependency. While its normal to become more dependent upon others for awhile immediately after a death, it will not be helpful to continue in that role long-term. Watch for signs that youre prolonging your need for assistance. Congratulate yourself when you do things for yourself.

## 2021-08-16 DIAGNOSIS — F41.8 DEPRESSION WITH ANXIETY: ICD-10-CM

## 2021-08-16 DIAGNOSIS — E78.5 HYPERLIPIDEMIA, UNSPECIFIED HYPERLIPIDEMIA TYPE: ICD-10-CM

## 2021-08-16 DIAGNOSIS — I10 ESSENTIAL HYPERTENSION: ICD-10-CM

## 2021-08-16 RX ORDER — ATORVASTATIN CALCIUM 20 MG/1
TABLET, FILM COATED ORAL
Qty: 90 TABLET | Refills: 3 | Status: SHIPPED | OUTPATIENT
Start: 2021-08-16

## 2021-08-16 RX ORDER — LOSARTAN POTASSIUM AND HYDROCHLOROTHIAZIDE 12.5; 5 MG/1; MG/1
TABLET ORAL
Qty: 90 TABLET | Refills: 3 | Status: SHIPPED | OUTPATIENT
Start: 2021-08-16

## 2021-08-16 RX ORDER — ESCITALOPRAM OXALATE 5 MG/1
TABLET ORAL
Qty: 90 TABLET | Refills: 3 | Status: SHIPPED | OUTPATIENT
Start: 2021-08-16

## 2021-10-15 ENCOUNTER — OFFICE VISIT (OUTPATIENT)
Dept: FAMILY MEDICINE CLINIC | Age: 56
End: 2021-10-15
Payer: COMMERCIAL

## 2021-10-15 VITALS
SYSTOLIC BLOOD PRESSURE: 128 MMHG | WEIGHT: 201 LBS | OXYGEN SATURATION: 98 % | TEMPERATURE: 97.1 F | HEART RATE: 100 BPM | DIASTOLIC BLOOD PRESSURE: 70 MMHG | HEIGHT: 62 IN | BODY MASS INDEX: 36.99 KG/M2

## 2021-10-15 DIAGNOSIS — Z11.4 ENCOUNTER FOR SCREENING FOR HIV: ICD-10-CM

## 2021-10-15 DIAGNOSIS — Z11.59 NEED FOR HEPATITIS C SCREENING TEST: ICD-10-CM

## 2021-10-15 DIAGNOSIS — E03.9 HYPOTHYROIDISM, UNSPECIFIED TYPE: ICD-10-CM

## 2021-10-15 DIAGNOSIS — E11.69 TYPE 2 DIABETES MELLITUS WITH OTHER SPECIFIED COMPLICATION, WITHOUT LONG-TERM CURRENT USE OF INSULIN (HCC): ICD-10-CM

## 2021-10-15 DIAGNOSIS — M25.511 ACUTE PAIN OF RIGHT SHOULDER: Primary | ICD-10-CM

## 2021-10-15 DIAGNOSIS — I10 ESSENTIAL HYPERTENSION: ICD-10-CM

## 2021-10-15 LAB
ALBUMIN SERPL-MCNC: 4.3 G/DL (ref 3.5–5.2)
ALP BLD-CCNC: 63 U/L (ref 35–104)
ALT SERPL-CCNC: 31 U/L (ref 5–33)
ANION GAP SERPL CALCULATED.3IONS-SCNC: 10 MMOL/L (ref 7–19)
AST SERPL-CCNC: 25 U/L (ref 5–32)
BILIRUB SERPL-MCNC: 0.4 MG/DL (ref 0.2–1.2)
BILIRUBIN URINE: NEGATIVE
BLOOD, URINE: NEGATIVE
BUN BLDV-MCNC: 13 MG/DL (ref 6–20)
CALCIUM SERPL-MCNC: 9.3 MG/DL (ref 8.6–10)
CHLORIDE BLD-SCNC: 100 MMOL/L (ref 98–111)
CHOLESTEROL, TOTAL: 163 MG/DL (ref 160–199)
CLARITY: ABNORMAL
CO2: 28 MMOL/L (ref 22–29)
COLOR: YELLOW
CREAT SERPL-MCNC: 0.8 MG/DL (ref 0.5–0.9)
GFR AFRICAN AMERICAN: >59
GFR NON-AFRICAN AMERICAN: >60
GLUCOSE BLD-MCNC: 121 MG/DL (ref 74–109)
GLUCOSE URINE: NEGATIVE MG/DL
HBA1C MFR BLD: 6.2 % (ref 4–6)
HCT VFR BLD CALC: 43.2 % (ref 37–47)
HDLC SERPL-MCNC: 41 MG/DL (ref 65–121)
HEMOGLOBIN: 13 G/DL (ref 12–16)
HEPATITIS C ANTIBODY INTERPRETATION: NORMAL
HIV-1 P24 AG: NORMAL
KETONES, URINE: ABNORMAL MG/DL
LDL CHOLESTEROL CALCULATED: 95 MG/DL
LEUKOCYTE ESTERASE, URINE: NEGATIVE
MCH RBC QN AUTO: 26.6 PG (ref 27–31)
MCHC RBC AUTO-ENTMCNC: 30.1 G/DL (ref 33–37)
MCV RBC AUTO: 88.3 FL (ref 81–99)
MICROALBUMIN UR-MCNC: 1.4 MG/DL (ref 0–19)
NITRITE, URINE: NEGATIVE
PDW BLD-RTO: 14 % (ref 11.5–14.5)
PH UA: 6.5 (ref 5–8)
PLATELET # BLD: 274 K/UL (ref 130–400)
PMV BLD AUTO: 10.3 FL (ref 9.4–12.3)
POTASSIUM SERPL-SCNC: 4.1 MMOL/L (ref 3.5–5)
PROTEIN UA: ABNORMAL MG/DL
RAPID HIV 1&2: NORMAL
RBC # BLD: 4.89 M/UL (ref 4.2–5.4)
SODIUM BLD-SCNC: 138 MMOL/L (ref 136–145)
SPECIFIC GRAVITY UA: 1.03 (ref 1–1.03)
TOTAL PROTEIN: 7.6 G/DL (ref 6.6–8.7)
TRIGL SERPL-MCNC: 134 MG/DL (ref 0–149)
TSH SERPL DL<=0.05 MIU/L-ACNC: 24.04 UIU/ML (ref 0.27–4.2)
UROBILINOGEN, URINE: 1 E.U./DL
WBC # BLD: 5.8 K/UL (ref 4.8–10.8)

## 2021-10-15 PROCEDURE — 99214 OFFICE O/P EST MOD 30 MIN: CPT | Performed by: FAMILY MEDICINE

## 2021-10-15 ASSESSMENT — ENCOUNTER SYMPTOMS
GASTROINTESTINAL NEGATIVE: 1
ALLERGIC/IMMUNOLOGIC NEGATIVE: 1
EYES NEGATIVE: 1
RESPIRATORY NEGATIVE: 1

## 2021-10-15 NOTE — PROGRESS NOTES
SUBJECTIVE:    Patient ID: Nahomi Huggins is a 60 y. o.female. HPI:   Patient here for evaluation of shoulder pain  Patient is a 61-year-old white female. She complains of right shoulder pain. Shoulder pain is an anterior part. Seems to be worse with above head activity. She was shoveling gravel. This been going on for a couple of weeks now. She also have diabetes. She is on Trulicity. She is compliant with therapy. She is due for blood work. She also have hypertension take blood pressure medicine. Blood pressures well controlled. She have hyperlipidemia and takes high potency statin therapy. Health maintenance  Patient is due for Covid vaccine but refused. She wants to get influenza vaccine at work. Past Medical History:   Diagnosis Date    Benign essential tremor     Diabetes mellitus type II     HTN (hypertension)     Hyperlipidemia     Hypothyroidism     Syncope     Type II or unspecified type diabetes mellitus without mention of complication, not stated as uncontrolled       Current Outpatient Medications   Medication Sig Dispense Refill    metoprolol tartrate (LOPRESSOR) 25 MG tablet TAKE 1 TABLET TWICE A  tablet 3    atorvastatin (LIPITOR) 20 MG tablet TAKE 1 TABLET DAILY 90 tablet 3    losartan-hydroCHLOROthiazide (HYZAAR) 50-12.5 MG per tablet TAKE 1 TABLET DAILY 90 tablet 3    escitalopram (LEXAPRO) 5 MG tablet TAKE 1 TABLET DAILY 90 tablet 3    Dulaglutide 1.5 MG/0.5ML SOPN Inject 1.5 mg into the skin once a week 12 pen 3    levothyroxine (SYNTHROID) 175 MCG tablet Take 1 tablet by mouth Daily 90 tablet 3    Insulin Pen Needle (B-D UF III MINI PEN NEEDLES) 31G X 5 MM MISC 1 each by Does not apply route daily 100 each 3    ondansetron (ZOFRAN) 4 MG tablet Take 1 tablet by mouth 3 times daily as needed for Nausea or Vomiting 30 tablet 0    vitamin B-12 (CYANOCOBALAMIN) 500 MCG tablet Take 500 mcg by mouth daily.       aspirin 81 MG EC tablet Take 81 mg by mouth daily. No current facility-administered medications for this visit. Allergies   Allergen Reactions    Metformin And Related Nausea And Vomiting    Codeine     Darvocet [Propoxyphene N-Acetaminophen]     Inderal [Propranolol Hcl]     Morphine Rash       Review of Systems   Constitutional: Negative. HENT: Negative. Eyes: Negative. Respiratory: Negative. Cardiovascular: Negative. Gastrointestinal: Negative. Endocrine: Negative. Genitourinary: Negative. Musculoskeletal: Negative. Skin: Negative. Allergic/Immunologic: Negative. Neurological: Negative. Hematological: Negative. Psychiatric/Behavioral: Negative. OBJECTIVE:    Physical Exam  Constitutional:       Appearance: Normal appearance. She is well-developed and well-groomed. HENT:      Head: Normocephalic and atraumatic. Right Ear: Tympanic membrane, ear canal and external ear normal. There is no impacted cerumen. Left Ear: Tympanic membrane, ear canal and external ear normal. There is no impacted cerumen. Nose: Nose normal.      Mouth/Throat:      Lips: Pink. Mouth: Mucous membranes are moist.      Dentition: Normal dentition. Pharynx: Oropharynx is clear. Uvula midline. Eyes:      General: Lids are normal.         Right eye: No discharge. Left eye: No discharge. Extraocular Movements: Extraocular movements intact. Conjunctiva/sclera: Conjunctivae normal.      Right eye: Right conjunctiva is not injected. Left eye: Left conjunctiva is not injected. Pupils: Pupils are equal, round, and reactive to light. Neck:      Thyroid: No thyromegaly. Vascular: No carotid bruit or JVD. Cardiovascular:      Rate and Rhythm: Normal rate and regular rhythm. Pulses: Normal pulses. Carotid pulses are 2+ on the right side and 2+ on the left side. Radial pulses are 2+ on the right side and 2+ on the left side.       Heart sounds: Normal heart sounds, S1 normal and S2 normal. No murmur heard. Pulmonary:      Effort: Pulmonary effort is normal.      Breath sounds: Normal breath sounds. Abdominal:      General: Bowel sounds are normal. There is no distension or abdominal bruit. Palpations: Abdomen is soft. There is no mass. Hernia: No hernia is present. Musculoskeletal:         General: Normal range of motion. Cervical back: Full passive range of motion without pain, normal range of motion and neck supple. Right lower leg: No edema. Left lower leg: No edema. Lymphadenopathy:      Cervical: No cervical adenopathy. Right cervical: No superficial cervical adenopathy. Left cervical: No superficial cervical adenopathy. Upper Body:      Right upper body: No supraclavicular adenopathy. Left upper body: No supraclavicular adenopathy. Skin:     General: Skin is warm and dry. Coloration: Skin is not pale. Findings: No lesion or rash. Nails: There is no clubbing. Neurological:      Mental Status: She is alert and oriented to person, place, and time. Motor: No weakness or tremor. Coordination: Coordination normal.      Deep Tendon Reflexes: Reflexes are normal and symmetric. Psychiatric:         Attention and Perception: Attention normal.         Mood and Affect: Mood normal.         Speech: Speech normal.         Behavior: Behavior normal. Behavior is cooperative. Thought Content: Thought content normal.         Cognition and Memory: Cognition and memory normal.         Judgment: Judgment normal.        /70 (Site: Right Upper Arm, Position: Sitting, Cuff Size: Medium Adult)   Pulse 100   Temp 97.1 °F (36.2 °C) (Temporal)   Ht 5' 2\" (1.575 m)   Wt 201 lb (91.2 kg)   SpO2 98%   BMI 36.76 kg/m²      ASSESSMENT:     Diagnosis Orders   1. Acute pain of right shoulder  Lonny Hodges MD, Orthopaedic Surgery, [place]   2.  Type 2 diabetes mellitus with other specified complication, without long-term current use of insulin (HCC)need blood work    3. Essential hypertensionwell controlled    4. Need for hepatitis C screening test  Hepatitis C Antibody   5. Encounter for screening for HIV  HIV Rapid 1&2        PLAN:    1. Refer to orthopedic surgery. 2.  Obtain blood work. Monofilament test performed today. Continue same treatment plan for now. 3.  Continue medication obtain blood work  4. Blood work  6.   Blood work  Follow-up 6 months or earlier as needed

## 2021-10-20 ENCOUNTER — PATIENT MESSAGE (OUTPATIENT)
Dept: FAMILY MEDICINE CLINIC | Age: 56
End: 2021-10-20

## 2021-10-20 DIAGNOSIS — E03.9 HYPOTHYROIDISM, UNSPECIFIED TYPE: ICD-10-CM

## 2021-10-20 RX ORDER — LEVOTHYROXINE SODIUM 0.2 MG/1
200 TABLET ORAL DAILY
Qty: 30 TABLET | Refills: 5 | Status: SHIPPED | OUTPATIENT
Start: 2021-10-20 | End: 2022-04-05

## 2021-10-20 NOTE — TELEPHONE ENCOUNTER
From: Colin Marroquin  Sent: 10/20/2021 12:21 PM CDT  To: [facility] Clinical Staff  Subject: RE: Lab results    [facility]    ----- Message -----  From: Jurgen Pérez  Sent: 10/20/21 10:51 AM  To: Colin Aakashmonica  Subject: Lab results    Cyndee President,    Your labs have been reviewed by Dr. Mark Sewell and are within normal limits / stable for you with the exception of your thyroid level. He is wanting to adjust your synthroid to the next higher dose which should be 200 mcg. I just need to know what pharmacy you want that sent to. We will recheck it in 6 weeks to make sure the dose is appropriate. Please let me know if you have any questions or concerns.     Judy Oliva

## 2022-04-05 DIAGNOSIS — E03.9 HYPOTHYROIDISM, UNSPECIFIED TYPE: ICD-10-CM

## 2022-04-05 RX ORDER — LEVOTHYROXINE SODIUM 0.2 MG/1
TABLET ORAL
Qty: 90 TABLET | Refills: 1 | Status: SHIPPED | OUTPATIENT
Start: 2022-04-05

## 2022-04-07 ENCOUNTER — OFFICE VISIT (OUTPATIENT)
Dept: INTERNAL MEDICINE | Facility: CLINIC | Age: 57
End: 2022-04-07

## 2022-04-07 VITALS
HEIGHT: 62 IN | HEART RATE: 80 BPM | BODY MASS INDEX: 36.8 KG/M2 | WEIGHT: 200 LBS | DIASTOLIC BLOOD PRESSURE: 80 MMHG | SYSTOLIC BLOOD PRESSURE: 133 MMHG | RESPIRATION RATE: 18 BRPM | OXYGEN SATURATION: 99 % | TEMPERATURE: 97.8 F

## 2022-04-07 DIAGNOSIS — E11.9 TYPE 2 DIABETES MELLITUS WITHOUT COMPLICATION, WITHOUT LONG-TERM CURRENT USE OF INSULIN: ICD-10-CM

## 2022-04-07 DIAGNOSIS — F41.8 DEPRESSION WITH ANXIETY: ICD-10-CM

## 2022-04-07 DIAGNOSIS — I10 ESSENTIAL HYPERTENSION: ICD-10-CM

## 2022-04-07 DIAGNOSIS — E03.9 ACQUIRED HYPOTHYROIDISM: Primary | ICD-10-CM

## 2022-04-07 DIAGNOSIS — E78.5 DYSLIPIDEMIA (HIGH LDL; LOW HDL): ICD-10-CM

## 2022-04-07 DIAGNOSIS — R55 VASOVAGAL SYNCOPE: ICD-10-CM

## 2022-04-07 PROBLEM — Z83.719 FAMILY HISTORY OF POLYPS IN THE COLON: Status: ACTIVE | Noted: 2017-04-04

## 2022-04-07 PROBLEM — R19.5 HEME POSITIVE STOOL: Status: ACTIVE | Noted: 2017-04-04

## 2022-04-07 PROBLEM — Z83.71 FAMILY HISTORY OF POLYPS IN THE COLON: Status: ACTIVE | Noted: 2017-04-04

## 2022-04-07 PROCEDURE — 99204 OFFICE O/P NEW MOD 45 MIN: CPT | Performed by: NURSE PRACTITIONER

## 2022-04-07 RX ORDER — ESCITALOPRAM OXALATE 5 MG/1
5 TABLET ORAL DAILY
Qty: 90 TABLET | Refills: 1 | Status: SHIPPED | OUTPATIENT
Start: 2022-04-07 | End: 2022-05-26 | Stop reason: SDUPTHER

## 2022-04-07 RX ORDER — ATORVASTATIN CALCIUM 20 MG/1
TABLET, FILM COATED ORAL
COMMUNITY
Start: 2019-06-05 | End: 2022-04-07 | Stop reason: SDUPTHER

## 2022-04-07 RX ORDER — ATORVASTATIN CALCIUM 20 MG/1
20 TABLET, FILM COATED ORAL DAILY
Qty: 90 TABLET | Refills: 1 | Status: SHIPPED | OUTPATIENT
Start: 2022-04-07 | End: 2022-10-26

## 2022-04-07 RX ORDER — LEVOTHYROXINE SODIUM 0.2 MG/1
200 TABLET ORAL DAILY
COMMUNITY
Start: 2022-01-03 | End: 2022-04-07 | Stop reason: SDUPTHER

## 2022-04-07 RX ORDER — ESCITALOPRAM OXALATE 5 MG/1
5 TABLET ORAL
COMMUNITY
Start: 2019-06-05 | End: 2022-04-07 | Stop reason: SDUPTHER

## 2022-04-07 RX ORDER — LOSARTAN POTASSIUM AND HYDROCHLOROTHIAZIDE 12.5; 5 MG/1; MG/1
TABLET ORAL
COMMUNITY
Start: 2019-06-05 | End: 2022-04-07 | Stop reason: SDUPTHER

## 2022-04-07 RX ORDER — DULAGLUTIDE 1.5 MG/.5ML
1.5 INJECTION, SOLUTION SUBCUTANEOUS WEEKLY
COMMUNITY
End: 2022-04-07 | Stop reason: SDUPTHER

## 2022-04-07 RX ORDER — LOSARTAN POTASSIUM AND HYDROCHLOROTHIAZIDE 12.5; 5 MG/1; MG/1
1 TABLET ORAL DAILY
Qty: 90 TABLET | Refills: 1 | Status: SHIPPED | OUTPATIENT
Start: 2022-04-07 | End: 2022-10-21

## 2022-04-07 RX ORDER — PYRIDOXINE HCL (VITAMIN B6) 50 MG
500 TABLET ORAL
COMMUNITY
End: 2022-06-24

## 2022-04-07 RX ORDER — DULAGLUTIDE 1.5 MG/.5ML
1.5 INJECTION, SOLUTION SUBCUTANEOUS WEEKLY
Qty: 1.5 ML | Refills: 1 | Status: SHIPPED | OUTPATIENT
Start: 2022-04-07 | End: 2022-06-13

## 2022-04-07 RX ORDER — LEVOTHYROXINE SODIUM 0.2 MG/1
200 TABLET ORAL DAILY
Qty: 90 TABLET | Refills: 1 | Status: SHIPPED | OUTPATIENT
Start: 2022-04-07 | End: 2022-11-14

## 2022-04-07 RX ORDER — LEVOTHYROXINE SODIUM 0.2 MG/1
1 TABLET ORAL DAILY
COMMUNITY
Start: 2022-04-05 | End: 2022-04-07 | Stop reason: SDUPTHER

## 2022-04-07 RX ORDER — ASPIRIN 81 MG/1
81 TABLET ORAL
COMMUNITY

## 2022-04-07 NOTE — PROGRESS NOTES
"        Subjective     Chief Complaint   Patient presents with   • Hypertension   • Hypothyroidism   • Diabetes       History of Present Illness  Patient presents today to establish care with our clinic. She is a prior patient of Dr. Ernandez. She does take her blood pressure at home 120/76. Good control with medication. Blood sugars are averaging about 126 morning. Chronic knee pain. This does inhibit her ability to walk comfortably.  She does complain of trouble sleeping since her  passed about 7 years ago. She also lost her mother last year. She has tried melatonin and other medications, reports this \"knocks me out completely\". She reports fear of fires and leaving alone. There is concern of possible sleep apnea. Prior to her 's death he would wake her up during sleep due to apneic spells. She does wake up with a dry mouth. She does wear dentures.   Additionally she has dizziness. She has tried meclizine and this did not help. Uses Dramamine. Has seen general surgery in [place].     PHQ-9 Depression Screening  Little interest or pleasure in doing things? 1-->several days   Feeling down, depressed, or hopeless? 1-->several days   Trouble falling or staying asleep, or sleeping too much? 3-->nearly every day   Feeling tired or having little energy? 3-->nearly every day   Poor appetite or overeating? 3-->nearly every day   Feeling bad about yourself - or that you are a failure or have let yourself or your family down? 3-->nearly every day   Trouble concentrating on things, such as reading the newspaper or watching television? 3-->nearly every day   Moving or speaking so slowly that other people could have noticed? Or the opposite - being so fidgety or restless that you have been moving around a lot more than usual? 0-->not at all   Thoughts that you would be better off dead, or of hurting yourself in some way? 0-->not at all   PHQ-9 Total Score 17   If you checked off any problems, how difficult have these " problems made it for you to do your work, take care of things at home, or get along with other people? very difficult     Over the last two weeks, how often have you been bothered by the following problems?  Feeling nervous, anxious or on edge: Nearly every day  Not being able to stop or control worrying: Nearly every day  Worrying too much about different things: Nearly every day  Trouble Relaxing: Nearly every day  Being so restless that it is hard to sit still: Nearly every day  Becoming easily annoyed or irritable: Nearly every day  Feeling afraid as if something awful might happen: Nearly every day  KRISTI 7 Total Score: 21  If you checked any problems, how difficult have these problems made it for you to do your work, take care of things at home, or get along with other people: Very difficult      Review of Systems   Respiratory: Negative for chest tightness and shortness of breath.    Cardiovascular: Positive for palpitations. Negative for chest pain.   Gastrointestinal: Negative for abdominal pain, blood in stool, constipation, nausea and vomiting.   Genitourinary: Negative for difficulty urinating and hematuria.   Musculoskeletal: Positive for arthralgias.   Skin: Negative for rash and wound.   Neurological: Positive for dizziness. Negative for syncope and light-headedness.   Psychiatric/Behavioral: Positive for sleep disturbance. The patient is nervous/anxious.    All other systems reviewed and are negative.       Otherwise complete ROS reviewed and negative except as mentioned in the HPI.    Past Medical History:   Past Medical History:   Diagnosis Date   • Diabetes mellitus (HCC)    • Hyperlipidemia    • Hypertension    • Hyperthyroidism    • Tremor    • Vasovagal syncope      Past Surgical History:  Past Surgical History:   Procedure Laterality Date   • THYROIDECTOMY, PARTIAL Right      Social History:  reports that she has never smoked. She has never used smokeless tobacco. She reports previous alcohol  "use.    Family History: family history is not on file.       Allergies:  Allergies   Allergen Reactions   • Metformin And Related Nausea And Vomiting   • Other Hives   • Propranolol Hcl GI Intolerance   • Codeine Rash   • Morphine Rash     Medications:  Prior to Admission medications    Medication Sig Start Date End Date Taking? Authorizing Provider   aspirin 81 MG EC tablet Take 81 mg by mouth.   Yes Paulina Shelby MD   atorvastatin (LIPITOR) 20 MG tablet TAKE 1 TABLET DAILY 6/5/19  Yes Paulina Shelby MD   cyanocobalamin (VITAMIN B-12) 50 MCG tablet tablet Take 500 mcg by mouth.   Yes Paulina Shelby MD   Dulaglutide (Trulicity) 1.5 MG/0.5ML solution pen-injector Inject 1.5 mg under the skin into the appropriate area as directed 1 (One) Time Per Week.   Yes Paulina Shelby MD   escitalopram (LEXAPRO) 5 MG tablet Take 5 mg by mouth. 6/5/19  Yes Paulina Shelby MD   levothyroxine (SYNTHROID, LEVOTHROID) 200 MCG tablet Take 200 mcg by mouth Daily. 1/3/22  Yes Paulina Shelby MD   levothyroxine (SYNTHROID, LEVOTHROID) 200 MCG tablet Take 1 tablet by mouth Daily. 4/5/22  Yes Paulina Shelby MD   losartan-hydrochlorothiazide (HYZAAR) 50-12.5 MG per tablet TAKE 1 TABLET DAILY 6/5/19  Yes Paulina Shelby MD   metoprolol tartrate (LOPRESSOR) 25 MG tablet TAKE 1 TABLET TWICE A DAY 6/5/19  Yes Paulina Shelby MD   SITagliptin (JANUVIA) 100 MG tablet Take 100 mg by mouth. 6/5/19   Paulina Shelby MD       Objective     Vital Signs: /80 (BP Location: Right arm, Patient Position: Sitting, Cuff Size: Adult)   Pulse 80   Temp 97.8 °F (36.6 °C)   Resp 18   Ht 157.5 cm (62\")   Wt 90.7 kg (200 lb)   SpO2 99%   BMI 36.58 kg/m²   Physical Exam  Vitals and nursing note reviewed.   Constitutional:       General: She is not in acute distress.     Appearance: She is well-developed. She is obese. She is not ill-appearing.   HENT:      Head: Normocephalic and " atraumatic.   Eyes:      Pupils: Pupils are equal, round, and reactive to light.   Neck:      Vascular: No JVD.   Cardiovascular:      Rate and Rhythm: Normal rate and regular rhythm.   Pulmonary:      Effort: Pulmonary effort is normal.   Abdominal:      General: Bowel sounds are normal.      Palpations: Abdomen is soft.   Musculoskeletal:         General: No deformity.      Cervical back: Normal range of motion and neck supple.   Lymphadenopathy:      Cervical: No cervical adenopathy.   Skin:     General: Skin is warm and dry.   Neurological:      Mental Status: She is alert and oriented to person, place, and time.   Psychiatric:         Behavior: Behavior normal.         Thought Content: Thought content normal.         Judgment: Judgment normal.         Patient's Body mass index is 36.58 kg/m². indicating that she is morbidly obese (BMI > 40 or > 35 with obesity - related health condition). Obesity-related health conditions include the following: hypertension, diabetes mellitus and dyslipidemias. Obesity is unchanged. BMI is is above average; BMI management plan is completed. We discussed portion control and increasing exercise..      Results Reviewed:  Glucose   Date Value Ref Range Status   10/15/2021 121 (H) 74 - 109 mg/dL Final     BUN   Date Value Ref Range Status   10/15/2021 13 6 - 20 mg/dL Final     Creatinine   Date Value Ref Range Status   10/15/2021 0.8 0.5 - 0.9 mg/dL Final     Sodium   Date Value Ref Range Status   10/15/2021 138 136 - 145 mmol/L Final     Potassium   Date Value Ref Range Status   10/15/2021 4.1 3.5 - 5.0 mmol/L Final     Chloride   Date Value Ref Range Status   10/15/2021 100 98 - 111 mmol/L Final     CO2   Date Value Ref Range Status   10/15/2021 28 22 - 29 mmol/L Final     Calcium   Date Value Ref Range Status   10/15/2021 9.3 8.6 - 10.0 mg/dL Final     ALT (SGPT)   Date Value Ref Range Status   10/15/2021 31 5 - 33 U/L Final     AST (SGOT)   Date Value Ref Range Status    10/15/2021 25 5 - 32 U/L Final     WBC   Date Value Ref Range Status   10/15/2021 5.8 4.8 - 10.8 K/uL Final     Hematocrit   Date Value Ref Range Status   10/15/2021 43.2 37.0 - 47.0 % Final     Platelets   Date Value Ref Range Status   10/15/2021 274 130 - 400 K/uL Final     Triglycerides   Date Value Ref Range Status   10/15/2021 134 0 - 149 mg/dL Final     HDL Cholesterol   Date Value Ref Range Status   10/15/2021 41 (L) 65 - 121 mg/dL Final     Comment:     VALUES>60 MG/DL ARE ASSOCIATED WITH A DECREASED RISK OF  ATHEROSCLEROTIC CARDIOVASCULAR DISEASE     LDL Cholesterol    Date Value Ref Range Status   10/15/2021 95 <100 mg/dL Final     Comment:     <100 MG/DL=OPITIMAL    100-129 MG/DL=DESIRABLE    130-159 MG/DL BORDERLINE=INCREASED RISK OF ATHEROSCLEROTIC  CARDIOVASCULAR DISEASE    > OR = 160 MG/DL=ASSOCIATED WITH AN INCREASE RISK OF  ATHEROSCLEROTIC CARDIOVASCULAR DISEASE     Hemoglobin A1C   Date Value Ref Range Status   10/15/2021 6.2 (H) 4.0 - 6.0 % Final     Comment:     HbA1c levels >6% are an indication of hyperglycemia during the preceding 2  to 3 months or longer.    HbA1c levels may reach 20% or higher in poorly controlled diabetes.  Therapeutic action is suggested at levels above 8%.    Diabetes patients with HbA1c levels below 7% meet the goal of the American  Diabetes Association.    HbA1c levels below the established reference range may indicate recent  episodes of hypoglycemia, the presence of Hb variants, or shortened lifetime  of erythrocytes.          Assessment / Plan     Assessment/Plan:  Diagnoses and all orders for this visit:    1. Acquired hypothyroidism (Primary)  -     TSH  -     T4, free  -     levothyroxine (SYNTHROID, LEVOTHROID) 200 MCG tablet; Take 1 tablet by mouth Daily.  Dispense: 90 tablet; Refill: 1    2. Type 2 diabetes mellitus without complication, without long-term current use of insulin (HCC)  -     Comprehensive Metabolic Panel  -     CBC & Differential  -      Dulaglutide (Trulicity) 1.5 MG/0.5ML solution pen-injector; Inject 1.5 mg under the skin into the appropriate area as directed 1 (One) Time Per Week.  Dispense: 1.5 mL; Refill: 1  -     Hemoglobin A1c    3. Depression with anxiety  -     escitalopram (LEXAPRO) 5 MG tablet; Take 1 tablet by mouth Daily.  Dispense: 90 tablet; Refill: 1    4. Dyslipidemia (high LDL; low HDL)  -     atorvastatin (LIPITOR) 20 MG tablet; Take 1 tablet by mouth Daily.  Dispense: 90 tablet; Refill: 1    5. Essential hypertension  -     losartan-hydrochlorothiazide (HYZAAR) 50-12.5 MG per tablet; Take 1 tablet by mouth Daily.  Dispense: 90 tablet; Refill: 1  -     metoprolol tartrate (LOPRESSOR) 25 MG tablet; Take 1 tablet by mouth 2 (Two) Times a Day.  Dispense: 60 tablet; Refill: 6    6. Vasovagal syncope  -     metoprolol tartrate (LOPRESSOR) 25 MG tablet; Take 1 tablet by mouth 2 (Two) Times a Day.  Dispense: 60 tablet; Refill: 6        [place]:   Sitting and Readin  Watching TV: 0  Sitting, inactive in a public place: 0  As a passenger in a car for an hour without a break: 0  Lying down to rest in the afternoon: 0  Sitting and talking to someone: 0  Sitting quietly after a lunch without alcohol: 0  In a car, while stopped for a few minutes in traffic: 0  Total: 0      [facility] support with depression and anxiety.     Return in about 4 weeks (around 2022). unless patient needs to be seen sooner or acute issues arise.    Code Status: Full    I have discussed the patient results/orders and and plan/recommendation with them at today's visit.      Mary Matson, JAKY   2022

## 2022-04-09 LAB
ALBUMIN SERPL-MCNC: 4.5 G/DL (ref 3.8–4.9)
ALBUMIN/GLOB SERPL: 1.5 {RATIO} (ref 1.2–2.2)
ALP SERPL-CCNC: 61 IU/L (ref 44–121)
ALT SERPL-CCNC: 44 IU/L (ref 0–32)
AST SERPL-CCNC: 36 IU/L (ref 0–40)
BASOPHILS # BLD AUTO: 0.1 X10E3/UL (ref 0–0.2)
BASOPHILS NFR BLD AUTO: 1 %
BILIRUB SERPL-MCNC: 0.5 MG/DL (ref 0–1.2)
BUN SERPL-MCNC: 11 MG/DL (ref 6–24)
BUN/CREAT SERPL: 13 (ref 9–23)
CALCIUM SERPL-MCNC: 9.4 MG/DL (ref 8.7–10.2)
CHLORIDE SERPL-SCNC: 98 MMOL/L (ref 96–106)
CO2 SERPL-SCNC: 22 MMOL/L (ref 20–29)
CREAT SERPL-MCNC: 0.84 MG/DL (ref 0.57–1)
EGFRCR SERPLBLD CKD-EPI 2021: 80 ML/MIN/1.73
EOSINOPHIL # BLD AUTO: 0.2 X10E3/UL (ref 0–0.4)
EOSINOPHIL NFR BLD AUTO: 4 %
ERYTHROCYTE [DISTWIDTH] IN BLOOD BY AUTOMATED COUNT: 13.9 % (ref 11.7–15.4)
GLOBULIN SER CALC-MCNC: 3.1 G/DL (ref 1.5–4.5)
GLUCOSE SERPL-MCNC: 81 MG/DL (ref 65–99)
HBA1C MFR BLD: 6.2 % (ref 4.8–5.6)
HCT VFR BLD AUTO: 43.4 % (ref 34–46.6)
HGB BLD-MCNC: 13.8 G/DL (ref 11.1–15.9)
IMM GRANULOCYTES # BLD AUTO: 0 X10E3/UL (ref 0–0.1)
IMM GRANULOCYTES NFR BLD AUTO: 0 %
LYMPHOCYTES # BLD AUTO: 2.4 X10E3/UL (ref 0.7–3.1)
LYMPHOCYTES NFR BLD AUTO: 39 %
MCH RBC QN AUTO: 26.4 PG (ref 26.6–33)
MCHC RBC AUTO-ENTMCNC: 31.8 G/DL (ref 31.5–35.7)
MCV RBC AUTO: 83 FL (ref 79–97)
MONOCYTES # BLD AUTO: 0.5 X10E3/UL (ref 0.1–0.9)
MONOCYTES NFR BLD AUTO: 9 %
NEUTROPHILS # BLD AUTO: 2.9 X10E3/UL (ref 1.4–7)
NEUTROPHILS NFR BLD AUTO: 47 %
PLATELET # BLD AUTO: 285 X10E3/UL (ref 150–450)
POTASSIUM SERPL-SCNC: 4.4 MMOL/L (ref 3.5–5.2)
PROT SERPL-MCNC: 7.6 G/DL (ref 6–8.5)
RBC # BLD AUTO: 5.22 X10E6/UL (ref 3.77–5.28)
SODIUM SERPL-SCNC: 140 MMOL/L (ref 134–144)
T4 FREE SERPL-MCNC: 2.86 NG/DL (ref 0.82–1.77)
TSH SERPL DL<=0.005 MIU/L-ACNC: 0.13 UIU/ML (ref 0.45–4.5)
WBC # BLD AUTO: 6.1 X10E3/UL (ref 3.4–10.8)

## 2022-04-11 RX ORDER — LEVOTHYROXINE SODIUM 175 UG/1
175 TABLET ORAL DAILY
Qty: 30 TABLET | Refills: 1 | Status: SHIPPED | OUTPATIENT
Start: 2022-04-11 | End: 2022-05-19

## 2022-04-12 ENCOUNTER — PRIOR AUTHORIZATION (OUTPATIENT)
Dept: INTERNAL MEDICINE | Facility: CLINIC | Age: 57
End: 2022-04-12

## 2022-04-12 NOTE — TELEPHONE ENCOUNTER
Approvedtoday  The request has been approved. The authorization is effective for a maximum of 12 fills from 04/12/2022 to 04/11/2023, as long as the member is enrolled in their current health plan. The request was reviewed and approved by a licensed clinical pharmacist. A written notification letter will follow with additional details.

## 2022-04-12 NOTE — TELEPHONE ENCOUNTER
Trulicity 1.5MG/0.5ML pen-injectors      Key: [id number] - PA Case ID: [id number] - Rx #: [id number]    Sent to plan

## 2022-05-16 ENCOUNTER — OFFICE VISIT (OUTPATIENT)
Dept: INTERNAL MEDICINE | Facility: CLINIC | Age: 57
End: 2022-05-16

## 2022-05-16 VITALS
SYSTOLIC BLOOD PRESSURE: 132 MMHG | WEIGHT: 202.4 LBS | TEMPERATURE: 97.8 F | OXYGEN SATURATION: 99 % | HEART RATE: 98 BPM | BODY MASS INDEX: 46.84 KG/M2 | DIASTOLIC BLOOD PRESSURE: 81 MMHG | HEIGHT: 55 IN

## 2022-05-16 DIAGNOSIS — E03.9 ACQUIRED HYPOTHYROIDISM: ICD-10-CM

## 2022-05-16 DIAGNOSIS — Z12.31 SCREENING MAMMOGRAM FOR BREAST CANCER: Primary | ICD-10-CM

## 2022-05-16 DIAGNOSIS — Z00.00 ROUTINE GENERAL MEDICAL EXAMINATION AT A HEALTH CARE FACILITY: ICD-10-CM

## 2022-05-16 DIAGNOSIS — G56.03 BILATERAL CARPAL TUNNEL SYNDROME: ICD-10-CM

## 2022-05-16 PROCEDURE — 99396 PREV VISIT EST AGE 40-64: CPT | Performed by: NURSE PRACTITIONER

## 2022-05-16 RX ORDER — BACLOFEN 10 MG/1
10 TABLET ORAL 2 TIMES DAILY
Qty: 20 TABLET | Refills: 0 | Status: SHIPPED | OUTPATIENT
Start: 2022-05-16 | End: 2022-11-14

## 2022-05-16 NOTE — PROGRESS NOTES
Subjective     Chief Complaint   Patient presents with   • Annual Exam   • Spasms     From right abd/ribcage area to back     • Hand Pain     Bilateral,  goes numb        History of Present Illness  Pt presents for her annual physical. She c/o gris hand pain that is worse on the right. It will go numb sometimes. The pain is worse at night. She does roger quite often and says she used to be able to do it for 3 hours at a time and can barely stand it for 5 minutes now. Pain radiates up the arm. Pain feels like an extreme ache. She had torn half a finger nail off and did not realize it until she saw blood. Denies similar sensation in legs.   She does not check her BP at home. She has not passed out recently.   BS was 126 fasting this AM and this is typical for her. No lows.   Reports a sensation of butterflies in her chest a couple of times a month. This seems to be connected with stress. She quit her job in November due to her health and has not found another one yet.   She gets dizzy spells when she wakes up in the morning. It feels like the room is spinning and she will vomit sometimes. This happens on average 4 times a month. She has tried Meclizine in the past but it did not work and made her sick. Dramamine seems to help. These spells have happened since she was 10 but have worsened over the years.   She is due for her mammogram. She had a colonoscopy at [facility] years ago.     Review of Systems   Constitutional: Positive for fatigue and unexpected weight change.   HENT: Negative.    Eyes: Negative.    Respiratory: Negative for chest tightness, shortness of breath and wheezing.    Cardiovascular: Positive for palpitations (occasionally, 2x monthly). Negative for chest pain.   Gastrointestinal: Negative for abdominal pain, blood in stool, diarrhea and nausea.   Endocrine: Positive for heat intolerance and polydipsia.   Genitourinary: Negative.  Negative for dysuria, frequency and urgency.    Musculoskeletal: Positive for arthralgias.   Skin: Negative.    Allergic/Immunologic: Negative.    Neurological: Positive for dizziness, syncope (vasovagal), weakness and numbness (gris hands). Negative for light-headedness and headaches.   Hematological: Negative.    Psychiatric/Behavioral: Positive for sleep disturbance. The patient is nervous/anxious.       Otherwise complete ROS reviewed and negative except as mentioned in the HPI.    Past Medical History:   Past Medical History:   Diagnosis Date   • Diabetes mellitus (HCC)    • Hyperlipidemia    • Hypertension    • Hyperthyroidism    • Tremor    • Vasovagal syncope      Past Surgical History:  Past Surgical History:   Procedure Laterality Date   • THYROIDECTOMY, PARTIAL Right      Social History:  reports that she has never smoked. She has never used smokeless tobacco. She reports previous alcohol use. She reports that she does not use drugs.    Family History: family history is not on file.      Allergies:  Allergies   Allergen Reactions   • Metformin And Related Nausea And Vomiting   • Other Hives   • Propranolol Hcl GI Intolerance   • Codeine Rash   • Morphine Rash     Medications:  Prior to Admission medications    Medication Sig Start Date End Date Taking? Authorizing Provider   aspirin 81 MG EC tablet Take 81 mg by mouth.   Yes ProviderPaulina MD   atorvastatin (LIPITOR) 20 MG tablet Take 1 tablet by mouth Daily. 4/7/22  Yes Mary Matson APRN   cyanocobalamin (VITAMIN B-12) 50 MCG tablet tablet Take 500 mcg by mouth.   Yes ProviderPaulina MD   Dulaglutide (Trulicity) 1.5 MG/0.5ML solution pen-injector Inject 1.5 mg under the skin into the appropriate area as directed 1 (One) Time Per Week. 4/7/22  Yes Mary Matson APRN   escitalopram (LEXAPRO) 5 MG tablet Take 1 tablet by mouth Daily. 4/7/22  Yes Mary Matson APRN   levothyroxine (Synthroid) 175 MCG tablet Take 1 tablet by mouth Daily. 4/11/22  Yes Dano  "JAKY Draper   levothyroxine (SYNTHROID, LEVOTHROID) 200 MCG tablet Take 1 tablet by mouth Daily. 4/7/22  Yes Mary Matson APRN   losartan-hydrochlorothiazide (HYZAAR) 50-12.5 MG per tablet Take 1 tablet by mouth Daily. 4/7/22  Yes Mary Matson APRN   metoprolol tartrate (LOPRESSOR) 25 MG tablet Take 1 tablet by mouth 2 (Two) Times a Day. 4/7/22  Yes Mary Matson APRN       Objective     Vital Signs: /81 (BP Location: Left arm, Patient Position: Sitting, Cuff Size: Large Adult)   Pulse 98   Temp 97.8 °F (36.6 °C) (Infrared)   Ht 62 cm (24.41\")   Wt 91.8 kg (202 lb 6.4 oz)   SpO2 99%   .84 kg/m²   Physical Exam  Vitals reviewed.   Constitutional:       General: She is not in acute distress.     Appearance: She is well-developed. She is obese.   HENT:      Head: Normocephalic and atraumatic.      Right Ear: Tympanic membrane, ear canal and external ear normal.      Left Ear: Tympanic membrane, ear canal and external ear normal.      Nose: Nose normal.      Mouth/Throat:      Mouth: Mucous membranes are moist.      Pharynx: Oropharynx is clear.   Eyes:      Conjunctiva/sclera: Conjunctivae normal.      Pupils: Pupils are equal, round, and reactive to light.   Neck:      Vascular: No JVD.   Cardiovascular:      Rate and Rhythm: Normal rate and regular rhythm.      Heart sounds: Normal heart sounds.   Pulmonary:      Effort: Pulmonary effort is normal.      Breath sounds: Normal breath sounds.   Abdominal:      General: Bowel sounds are normal.      Palpations: Abdomen is soft.   Musculoskeletal:         General: No swelling or deformity.      Cervical back: Normal range of motion and neck supple.      Right lower leg: No edema.      Left lower leg: No edema.   Lymphadenopathy:      Cervical: No cervical adenopathy.   Skin:     General: Skin is warm and dry.   Neurological:      Mental Status: She is alert and oriented to person, place, and time.      " Sensory: Sensory deficit (numbness to gris hands) present.      Motor: Weakness (right hand) present.   Psychiatric:         Behavior: Behavior normal.         Thought Content: Thought content normal.         Judgment: Judgment normal.         Results Reviewed:  Glucose   Date Value Ref Range Status   04/07/2022 81 65 - 99 mg/dL Final   10/15/2021 121 (H) 74 - 109 mg/dL Final     BUN   Date Value Ref Range Status   04/07/2022 11 6 - 24 mg/dL Final   10/15/2021 13 6 - 20 mg/dL Final     Creatinine   Date Value Ref Range Status   04/07/2022 0.84 0.57 - 1.00 mg/dL Final   10/15/2021 0.8 0.5 - 0.9 mg/dL Final     Sodium   Date Value Ref Range Status   04/07/2022 140 134 - 144 mmol/L Final   10/15/2021 138 136 - 145 mmol/L Final     Potassium   Date Value Ref Range Status   04/07/2022 4.4 3.5 - 5.2 mmol/L Final   10/15/2021 4.1 3.5 - 5.0 mmol/L Final     Chloride   Date Value Ref Range Status   04/07/2022 98 96 - 106 mmol/L Final   10/15/2021 100 98 - 111 mmol/L Final     CO2   Date Value Ref Range Status   10/15/2021 28 22 - 29 mmol/L Final     Total CO2   Date Value Ref Range Status   04/07/2022 22 20 - 29 mmol/L Final     Calcium   Date Value Ref Range Status   04/07/2022 9.4 8.7 - 10.2 mg/dL Final   10/15/2021 9.3 8.6 - 10.0 mg/dL Final     ALT (SGPT)   Date Value Ref Range Status   04/07/2022 44 (H) 0 - 32 IU/L Final   10/15/2021 31 5 - 33 U/L Final     AST (SGOT)   Date Value Ref Range Status   04/07/2022 36 0 - 40 IU/L Final   10/15/2021 25 5 - 32 U/L Final     WBC   Date Value Ref Range Status   04/07/2022 6.1 3.4 - 10.8 x10E3/uL Final   10/15/2021 5.8 4.8 - 10.8 K/uL Final     Hematocrit   Date Value Ref Range Status   04/07/2022 43.4 34.0 - 46.6 % Final   10/15/2021 43.2 37.0 - 47.0 % Final     Platelets   Date Value Ref Range Status   04/07/2022 285 150 - 450 x10E3/uL Final   10/15/2021 274 130 - 400 K/uL Final     Triglycerides   Date Value Ref Range Status   10/15/2021 134 0 - 149 mg/dL Final     HDL  Cholesterol   Date Value Ref Range Status   10/15/2021 41 (L) 65 - 121 mg/dL Final     Comment:     VALUES>60 MG/DL ARE ASSOCIATED WITH A DECREASED RISK OF  ATHEROSCLEROTIC CARDIOVASCULAR DISEASE     LDL Cholesterol    Date Value Ref Range Status   10/15/2021 95 <100 mg/dL Final     Comment:     <100 MG/DL=OPITIMAL    100-129 MG/DL=DESIRABLE    130-159 MG/DL BORDERLINE=INCREASED RISK OF ATHEROSCLEROTIC  CARDIOVASCULAR DISEASE    > OR = 160 MG/DL=ASSOCIATED WITH AN INCREASE RISK OF  ATHEROSCLEROTIC CARDIOVASCULAR DISEASE     Hemoglobin A1C   Date Value Ref Range Status   04/07/2022 6.2 (H) 4.8 - 5.6 % Final     Comment:              Prediabetes: 5.7 - 6.4           Diabetes: >6.4           Glycemic control for adults with diabetes: <7.0     10/15/2021 6.2 (H) 4.0 - 6.0 % Final     Comment:     HbA1c levels >6% are an indication of hyperglycemia during the preceding 2  to 3 months or longer.    HbA1c levels may reach 20% or higher in poorly controlled diabetes.  Therapeutic action is suggested at levels above 8%.    Diabetes patients with HbA1c levels below 7% meet the goal of the American  Diabetes Association.    HbA1c levels below the established reference range may indicate recent  episodes of hypoglycemia, the presence of Hb variants, or shortened lifetime  of erythrocytes.          Assessment / Plan     Assessment/Plan:  Diagnoses and all orders for this visit:    1. Screening mammogram for breast cancer (Primary)  -     Mammo Screening Digital Tomosynthesis Bilateral With CAD; Future    2. Routine general medical examination at a health care facility  -     CBC & Differential  -     Comprehensive Metabolic Panel  -     Lipid Panel    3. Acquired hypothyroidism  -     TSH  -     T4, free    4. Bilateral carpal tunnel syndrome  -     EMG & Nerve Conduction Test; Future      No follow-ups on file. unless patient needs to be seen sooner or acute issues arise.    Code Status: Full.     I have discussed the patient  results/orders and and plan/recommendation with them at today's visit.      Mary Matson, APRN   05/16/2022

## 2022-05-18 LAB
ALBUMIN SERPL-MCNC: 4.4 G/DL (ref 3.8–4.9)
ALBUMIN/GLOB SERPL: 1.6 {RATIO} (ref 1.2–2.2)
ALP SERPL-CCNC: 60 IU/L (ref 44–121)
ALT SERPL-CCNC: 32 IU/L (ref 0–32)
AST SERPL-CCNC: 27 IU/L (ref 0–40)
BASOPHILS # BLD AUTO: 0.1 X10E3/UL (ref 0–0.2)
BASOPHILS NFR BLD AUTO: 1 %
BILIRUB SERPL-MCNC: 0.3 MG/DL (ref 0–1.2)
BUN SERPL-MCNC: 15 MG/DL (ref 6–24)
BUN/CREAT SERPL: 17 (ref 9–23)
CALCIUM SERPL-MCNC: 9.4 MG/DL (ref 8.7–10.2)
CHLORIDE SERPL-SCNC: 103 MMOL/L (ref 96–106)
CHOLEST SERPL-MCNC: 184 MG/DL (ref 100–199)
CO2 SERPL-SCNC: 19 MMOL/L (ref 20–29)
CREAT SERPL-MCNC: 0.87 MG/DL (ref 0.57–1)
EGFRCR SERPLBLD CKD-EPI 2021: 77 ML/MIN/1.73
EOSINOPHIL # BLD AUTO: 0.2 X10E3/UL (ref 0–0.4)
EOSINOPHIL NFR BLD AUTO: 3 %
ERYTHROCYTE [DISTWIDTH] IN BLOOD BY AUTOMATED COUNT: 13.7 % (ref 11.7–15.4)
GLOBULIN SER CALC-MCNC: 2.8 G/DL (ref 1.5–4.5)
GLUCOSE SERPL-MCNC: 109 MG/DL (ref 65–99)
HCT VFR BLD AUTO: 45 % (ref 34–46.6)
HDLC SERPL-MCNC: 36 MG/DL
HGB BLD-MCNC: 13.9 G/DL (ref 11.1–15.9)
IMM GRANULOCYTES # BLD AUTO: 0 X10E3/UL (ref 0–0.1)
IMM GRANULOCYTES NFR BLD AUTO: 0 %
LDLC SERPL CALC-MCNC: 110 MG/DL (ref 0–99)
LYMPHOCYTES # BLD AUTO: 2.4 X10E3/UL (ref 0.7–3.1)
LYMPHOCYTES NFR BLD AUTO: 41 %
MCH RBC QN AUTO: 26 PG (ref 26.6–33)
MCHC RBC AUTO-ENTMCNC: 30.9 G/DL (ref 31.5–35.7)
MCV RBC AUTO: 84 FL (ref 79–97)
MONOCYTES # BLD AUTO: 0.4 X10E3/UL (ref 0.1–0.9)
MONOCYTES NFR BLD AUTO: 7 %
NEUTROPHILS # BLD AUTO: 2.8 X10E3/UL (ref 1.4–7)
NEUTROPHILS NFR BLD AUTO: 48 %
PLATELET # BLD AUTO: 292 X10E3/UL (ref 150–450)
POTASSIUM SERPL-SCNC: 4 MMOL/L (ref 3.5–5.2)
PROT SERPL-MCNC: 7.2 G/DL (ref 6–8.5)
RBC # BLD AUTO: 5.35 X10E6/UL (ref 3.77–5.28)
SODIUM SERPL-SCNC: 139 MMOL/L (ref 134–144)
T4 FREE SERPL-MCNC: 0.6 NG/DL (ref 0.82–1.77)
TRIGL SERPL-MCNC: 218 MG/DL (ref 0–149)
TSH SERPL DL<=0.005 MIU/L-ACNC: 30.3 UIU/ML (ref 0.45–4.5)
VLDLC SERPL CALC-MCNC: 38 MG/DL (ref 5–40)
WBC # BLD AUTO: 6 X10E3/UL (ref 3.4–10.8)

## 2022-05-19 RX ORDER — LEVOTHYROXINE SODIUM 0.2 MG/1
200 TABLET ORAL DAILY
Qty: 30 TABLET | Refills: 1 | Status: SHIPPED | OUTPATIENT
Start: 2022-05-19 | End: 2022-06-24

## 2022-05-26 ENCOUNTER — TELEPHONE (OUTPATIENT)
Dept: INTERNAL MEDICINE | Facility: CLINIC | Age: 57
End: 2022-05-26

## 2022-05-26 DIAGNOSIS — F41.8 DEPRESSION WITH ANXIETY: ICD-10-CM

## 2022-05-26 RX ORDER — ESCITALOPRAM OXALATE 10 MG/1
5 TABLET ORAL DAILY
Qty: 90 TABLET | Refills: 1 | Status: SHIPPED | OUTPATIENT
Start: 2022-05-26 | End: 2022-07-18

## 2022-05-26 NOTE — TELEPHONE ENCOUNTER
Caller: Heidi Dhaliwal    Relationship: Self    Best call back number [phone]    What medication are you requesting: ESCITALOPRAM ( LEXAPRO) 10 MG    What are your current symptoms: ANXIETY AND DEPRESSION    How long have you been experiencing symptoms: N/A    Have you had these symptoms before:    [x] Yes  [] No    Have you been treated for these symptoms before:   [x] Yes  [] No    If a prescription is needed, what is your preferred pharmacy and phone number: [facility] [address] [place] [address] [phone] [facility] [phone]      Additional notes: THE PATIENT STATES THAT  SHE UPPED THE LEXAPRO DOSE TO 10 MG FOR 10 DAYS PER JAKY TEJEDA INSTRUCTION AND FEELS MUCH BETTER. THE PATIENT STATES THAT SHE WOULD LIKE TO SEE IF A NEW PRESCRIPTION WITH A HIGHER DOSE CAN BE CALLED IN.

## 2022-06-08 ENCOUNTER — HOSPITAL ENCOUNTER (OUTPATIENT)
Dept: NEUROLOGY | Facility: HOSPITAL | Age: 57
Discharge: HOME | End: 2022-06-08
Admitting: NURSE PRACTITIONER

## 2022-06-08 DIAGNOSIS — G56.03 BILATERAL CARPAL TUNNEL SYNDROME: ICD-10-CM

## 2022-06-08 PROCEDURE — 95885 MUSC TST DONE W/NERV TST LIM: CPT

## 2022-06-08 PROCEDURE — 95885 MUSC TST DONE W/NERV TST LIM: CPT | Performed by: PSYCHIATRY & NEUROLOGY

## 2022-06-08 PROCEDURE — 95911 NRV CNDJ TEST 9-10 STUDIES: CPT | Performed by: PSYCHIATRY & NEUROLOGY

## 2022-06-08 PROCEDURE — 95911 NRV CNDJ TEST 9-10 STUDIES: CPT

## 2022-06-09 DIAGNOSIS — G56.03 CARPAL TUNNEL SYNDROME, BILATERAL: Primary | ICD-10-CM

## 2022-06-12 DIAGNOSIS — E11.9 TYPE 2 DIABETES MELLITUS WITHOUT COMPLICATION, WITHOUT LONG-TERM CURRENT USE OF INSULIN: ICD-10-CM

## 2022-06-13 RX ORDER — DULAGLUTIDE 1.5 MG/.5ML
INJECTION, SOLUTION SUBCUTANEOUS
Qty: 4 ML | Refills: 0 | Status: SHIPPED | OUTPATIENT
Start: 2022-06-13 | End: 2022-08-18 | Stop reason: SDUPTHER

## 2022-06-13 NOTE — TELEPHONE ENCOUNTER
Rx Refill Note  Requested Prescriptions     Pending Prescriptions Disp Refills   • Trulicity 1.5 MG/0.5ML solution pen-injector [Pharmacy Med Name: Trulicity 1.5 MG/0.5ML Subcutaneous Solution Pen-injector] 4 mL 0     Sig: INJECT 1.5 MG (ONE PEN) SUB Q AS DIRECTED ONCE WEEKLY.      Last office visit with prescribing clinician: 5/16/2022      Next office visit with prescribing clinician: 7/18/2022            Rubina Cash RN  06/13/22, 08:10 CDT

## 2022-06-22 ENCOUNTER — TRANSCRIBE ORDERS (OUTPATIENT)
Dept: LAB | Facility: HOSPITAL | Age: 57
End: 2022-06-22

## 2022-06-22 DIAGNOSIS — Z11.59 SCREENING FOR VIRAL DISEASE: Primary | ICD-10-CM

## 2022-06-22 DIAGNOSIS — Z12.31 SCREENING MAMMOGRAM FOR BREAST CANCER: ICD-10-CM

## 2022-06-23 NOTE — DISCHARGE INSTRUCTIONS
UPPER EXTREMITY POST-OP INSTRUCTIONS - DR. ASIF    IMPORTANT PHONE NUMBERS:   For emergencies, please call [phone]   You may reach Dr. Asif and clinical staff at [phone]- M-F 8:00 am-5:00 pm   After 5pm or on the weekends, please call [phone]   Call immediately if you have any of the following symptoms:     Elevated temperature above 101.5 degrees for more than 48 hours after surgery     Persistent drainage from wound     Severe pain around surgical site    Sling use: The sling is provided for your comfort and to ensure proper healing of your repair following surgery. Please place the abduction pillow with the curved side against your side and the sling on the side of the pillow. Your surgery requires that you wear the sling if noted below.  ____ For comfort. Remove sling 24 hours and begin range of motion exercises  ____ At all times except bathing, dressing, and therapy. Also wear the sling during sleep.  __x__ No sling required    Bathing:  ___No bandages, no restrictions!!  ___You may remove you dressing and shower on the 3rd day after surgery (Ex. Tues surgery, shower on Friday)  ** if you are told to it is ok to remove your dressing and shower, DO NOT SOAK your incisions in a tub.  __x_Keep splint clean, dry, and intact. DO NOT place foreign objects into your splint.      Dressings: Keep dressing/splint intact unless instructed otherwise below. SOME DRAINAGE IS NORMAL!     DO NOT touch or apply ointment to the incision.     DO NOT remove the steri-strips over the incisions (if you have steri-strips). They will         generally fall off on their own or can be removed 1 weeksafter surgery.     If you have yellow gauze and it comes off, do not worry about it. Leave them off.    Signs of infection that warrant a phone call to our clinical line:     o Excessive drainage or redness     o Red streaking coming away from the incision  o Increased pain  o Increased temperature above 101  degrees      Physical Therapy:        *  Your physical therapy status will be discussed with you postoperatively and at your first post-op appointment. Some injuries will not require physical therapy.      *  If you have a shoulder manipulation, please schedule therapy for the next day      Medications: You will be discharged with the appropriate medications following your surgery. Fill these at the pharmacy and take them as directed on the label. Not all of the medications below may be prescribed. Occasionally, other medications may be prescribed with specific instructions.    Percocet/Lortab (oxycodone/hydrocodone with tylenol) - Pain Medication, will cause drowsiness, possibly itchiness (this is NOT an allergy - use benadryl or an over the counter allergy medication such as Claritin or Zyrtec)     o Take 1-2 tablets every 4-6 hours. DO NOT EXCEED 4,000mg of Tylenol in 24 hours.  **DO NOT MIX WITH ALCOHOL, DRIVE WHILE TAKING, OR TAKE with extra TYLENOL**    Colace (Docusate) - stool softener, used for constipation. Take this only if you feel constipated.      Zofran (Ondansetron) or Phenergan - Anti-nausea medication, will cause drowsiness      *Starting January 2021, all narcotic medication must be prescribed electronically to your pharmacy.  Be sure to notify nursing of your preferred pharmacy.  If you are running low on pain medications, please notify us if you need a refill 24-48 hours prior to when you run out, so we can make arrangements to refill the prescription for you if we determine is necessary

## 2022-06-24 ENCOUNTER — LAB (OUTPATIENT)
Dept: LAB | Facility: HOSPITAL | Age: 57
End: 2022-06-24

## 2022-06-24 ENCOUNTER — PRE-ADMISSION TESTING (OUTPATIENT)
Dept: PREADMISSION TESTING | Facility: HOSPITAL | Age: 57
End: 2022-06-24

## 2022-06-24 VITALS
SYSTOLIC BLOOD PRESSURE: 137 MMHG | HEART RATE: 79 BPM | OXYGEN SATURATION: 94 % | BODY MASS INDEX: 34.4 KG/M2 | WEIGHT: 201.5 LBS | RESPIRATION RATE: 18 BRPM | DIASTOLIC BLOOD PRESSURE: 76 MMHG | HEIGHT: 64 IN

## 2022-06-24 PROBLEM — G56.01 RIGHT CARPAL TUNNEL SYNDROME: Status: ACTIVE | Noted: 2022-06-24

## 2022-06-24 LAB
ANION GAP SERPL CALCULATED.3IONS-SCNC: 6 MMOL/L (ref 5–15)
BUN SERPL-MCNC: 13 MG/DL (ref 6–20)
BUN/CREAT SERPL: 13 (ref 7–25)
CALCIUM SPEC-SCNC: 9.4 MG/DL (ref 8.6–10.5)
CHLORIDE SERPL-SCNC: 102 MMOL/L (ref 98–107)
CO2 SERPL-SCNC: 32 MMOL/L (ref 22–29)
CREAT SERPL-MCNC: 1 MG/DL (ref 0.57–1)
DEPRECATED RDW RBC AUTO: 39.8 FL (ref 37–54)
EGFRCR SERPLBLD CKD-EPI 2021: 65.4 ML/MIN/1.73
ERYTHROCYTE [DISTWIDTH] IN BLOOD BY AUTOMATED COUNT: 12.8 % (ref 12.3–15.4)
GLUCOSE SERPL-MCNC: 113 MG/DL (ref 65–99)
HCT VFR BLD AUTO: 43.3 % (ref 34–46.6)
HGB BLD-MCNC: 13.3 G/DL (ref 12–15.9)
MCH RBC QN AUTO: 26.2 PG (ref 26.6–33)
MCHC RBC AUTO-ENTMCNC: 30.7 G/DL (ref 31.5–35.7)
MCV RBC AUTO: 85.2 FL (ref 79–97)
PLATELET # BLD AUTO: 250 10*3/MM3 (ref 140–450)
PMV BLD AUTO: 9.9 FL (ref 6–12)
POTASSIUM SERPL-SCNC: 4.3 MMOL/L (ref 3.5–5.2)
RBC # BLD AUTO: 5.08 10*6/MM3 (ref 3.77–5.28)
SARS-COV-2 ORF1AB RESP QL NAA+PROBE: NOT DETECTED
SODIUM SERPL-SCNC: 140 MMOL/L (ref 136–145)
WBC NRBC COR # BLD: 5.31 10*3/MM3 (ref 3.4–10.8)

## 2022-06-24 PROCEDURE — 85027 COMPLETE CBC AUTOMATED: CPT

## 2022-06-24 PROCEDURE — C9803 HOPD COVID-19 SPEC COLLECT: HCPCS | Performed by: ORTHOPAEDIC SURGERY

## 2022-06-24 PROCEDURE — U0004 COV-19 TEST NON-CDC HGH THRU: HCPCS | Performed by: ORTHOPAEDIC SURGERY

## 2022-06-24 PROCEDURE — 93010 ELECTROCARDIOGRAM REPORT: CPT | Performed by: INTERNAL MEDICINE

## 2022-06-24 PROCEDURE — 36415 COLL VENOUS BLD VENIPUNCTURE: CPT

## 2022-06-24 PROCEDURE — 93005 ELECTROCARDIOGRAM TRACING: CPT

## 2022-06-24 PROCEDURE — 80048 BASIC METABOLIC PNL TOTAL CA: CPT

## 2022-06-24 NOTE — DISCHARGE INSTRUCTIONS
Before you come to the hospital        Arrival time: AS DIRECTED BY OFFICE     YOU MAY TAKE THE FOLLOWING MEDICATION(S) THE MORNING OF SURGERY WITH A SIP OF WATER: metoprolol    ***HOLD LOSARTAN FOR 24 HOURS PRIOR TO SURGERY***            ALL OTHER HOME MEDICATION CHECK WITH YOUR PHYSICIAN (especially if you are taking diabetes medicines or blood thinners)    Do not take any Erectile Dysfunction medications (EX: CIALIS, VIAGRA) 24 hours prior to surgery      If you were given and instructed to use a germ- killing soap, use as directed the night before surgery and the morning of surgery before coming to the hospital.             Eating and drinking restrictions prior to scheduled arrival time    2 Hours before arrival time STOP   Drinking Clear liquids (water, apple juice-no pulp)     6 Hours before arrival time STOP   Milk or drinks that contain milk, full liquids    6 Hours before arrival time STOP   Light meals or foods, such as toast or cereal    8 Hours before arrival time STOP   Heavy foods, such as meat, fried foods, or fatty foods    (It is extremely important that you follow these guidelines to prevent delay or cancelation of your procedure)     Clear Liquids  Water and flavored water                                                                      Clear Fruit juices, such as cranberry juice and apple juice.  Black coffee (NO cream of any kind, including powdered).  Plain tea  Clear bouillon or broth.  Flavored gelatin.  Soda.  Gatorade or Powerade.  Full liquid examples  Juices that have pulp.  Frozen ice pops that contain fruit pieces.  Coffee with creamer  Milk.  Yogurt.              MANAGING PAIN AFTER SURGERY    We know you are probably wondering what your pain will be like after surgery.  Following surgery it is unrealistic to expect you will not have pain.   Pain is how our bodies let us know that something is wrong or cautions us to be careful.  That said, our goal is to make your pain  tolerable.    Methods we may use to treat your pain include (oral or IV medications, PCAs, epidurals, nerve blocks, etc.)   While some procedures require IV pain medications for a short time after surgery, transitioning to pain medications by mouth allows for better management of pain.   Your nurse will encourage you to take oral pain medications whenever possible.  IV medications work almost immediately, but only last a short while.  Taking medications by mouth allows for a more constant level of medication in your blood stream for a longer period of time.      Once your pain is out of control it is harder to get back under control.  It is important you are aware when your next dose of pain medication is due.  If you are admitted, your nurse may write the time of your next dose on the white board in your room to help you remember.      We are interested in your pain and encourage you to inform us about aggravating factors during your visit.   Many times a simple repositioning every few hours can make a big difference.    If your physician says it is okay, do not let your pain prevent you from getting out of bed. Be sure to call your nurse for assistance prior to getting up so you do not fall.      Before surgery, please decide your tolerable pain goal.  These faces help describe the pain ratings we use on a 0-10 scale.   Be prepared to tell us your goal and whether or not you take pain or anxiety medications at home.

## 2022-06-24 NOTE — OP NOTE
Patient Name: Yari  : 1965  MRN: [id number]      DATE of SURGERY: 2022    SURGEON: Zeyad Gilbert MD    ASSISTANT: NONE    PREOPERATIVE DIAGNOSIS    Right carpal tunnel syndrome.       POSTOPERATIVE DIAGNOSIS    Right carpal tunnel syndrome.       PROCEDURE PERFORMED    Right carpal tunnel release.       IMPLANTS  None.       ANESTHESIA    General endotracheal.       OPERATIVE INDICATIONS    The patient is a 56 y.o. female who presented to my clinic with complaints of numbness and tingling in the hand with clinical exam and neurodiagnostic evidence of carpal tunnel syndrome.  The patient wished to proceed with surgery, understanding the risks, benefits, and alternatives.  Conservative treatment failed to improve symptoms.  The risks include, but are not limited to, that of anesthesia, bleeding, infection, pain, damage to the motor and sensory branch of the median nerve, chronic pillar pain, incomplete resolution of symptoms.       ESTIMATED BLOOD LOSS    Less than 5 mL.       SPECIMENS    None.       DRAINS  None.       COMPLICATIONS    None.       PROCEDURE IN DETAIL    The patient was seen in the preoperative holding room; once again, the informed consent form was reviewed with the patient and signed. The site of surgery was marked with the patient's agreement. After being transferred to the operating room, a timeout was performed identifying the correct patient as well as the operative site. Perioperative antibiotics were administered. The tourniquet was then placed on the brachium of the operative extremity. A sterile prep and drape was performed.       A longitudinal incision was made at the base of the palm in line with the radial border of the ring finger intersecting Escobar's cardinal line. Soft tissue was dissected in line with the incision through the palmar fascia. The transverse carpal ligament was identified and beginning distally, while protecting the superficial radial arch of  vessels, it was incised. A [place] elevator was inserted into the carpal tunnel protecting the underlying median nerve and transection of the ligament was performed proximally. The motor branch of the median nerve was identified and protected. Finally, a pair of blunt-tipped scissors was inserted with the points directed toward the ulnar aspect of the wrist to protect the palmar cutaneous branch of the median nerve and the distal forearm fascia was incised as well.       A complete release of the transverse carpal ligament was performed, verified visually as well as with palpation. The floor of the carpal tunnel was swept with a [place] identifying no masses. The incision was irrigated and the skin closed with simple interrupted 3-0 nylon sutures. Total tourniquet time was less than 10 minutes. A soft dressing was placed; the patient was awakened from anesthesia and transported to the recovery room in stable condition.       POSTOPERATIVE PLAN  Discharge home, return to clinic in 2 weeks for suture removal and activity as tolerated. No lifting heavier than a full coffee cup for 2 weeks.     Electronically signed by Zeyad Gilbert MD on 6/28/2022 at 09:22 CDT

## 2022-06-25 LAB
QT INTERVAL: 410 MS
QTC INTERVAL: 435 MS

## 2022-06-27 RX ORDER — BUPIVACAINE HCL/0.9 % NACL/PF 0.1 %
2 PLASTIC BAG, INJECTION (ML) EPIDURAL ONCE
Status: DISCONTINUED | OUTPATIENT
Start: 2022-06-28 | End: 2022-06-28 | Stop reason: HOSPADM

## 2022-06-28 ENCOUNTER — ANESTHESIA EVENT (OUTPATIENT)
Dept: PERIOP | Facility: HOSPITAL | Age: 57
End: 2022-06-28

## 2022-06-28 ENCOUNTER — HOSPITAL ENCOUNTER (OUTPATIENT)
Facility: HOSPITAL | Age: 57
Setting detail: HOSPITAL OUTPATIENT SURGERY
Discharge: HOME | End: 2022-06-28
Attending: ORTHOPAEDIC SURGERY | Admitting: ORTHOPAEDIC SURGERY

## 2022-06-28 ENCOUNTER — ANESTHESIA (OUTPATIENT)
Dept: PERIOP | Facility: HOSPITAL | Age: 57
End: 2022-06-28

## 2022-06-28 VITALS
RESPIRATION RATE: 16 BRPM | SYSTOLIC BLOOD PRESSURE: 134 MMHG | DIASTOLIC BLOOD PRESSURE: 69 MMHG | OXYGEN SATURATION: 93 % | TEMPERATURE: 97.1 F | HEART RATE: 84 BPM

## 2022-06-28 DIAGNOSIS — G56.01 RIGHT CARPAL TUNNEL SYNDROME: Primary | ICD-10-CM

## 2022-06-28 LAB
GLUCOSE BLDC GLUCOMTR-MCNC: 98 MG/DL (ref 70–130)
GLUCOSE BLDC GLUCOMTR-MCNC: 99 MG/DL (ref 70–130)

## 2022-06-28 PROCEDURE — 25010000002 ONDANSETRON PER 1 MG: Performed by: NURSE ANESTHETIST, CERTIFIED REGISTERED

## 2022-06-28 PROCEDURE — 25010000002 PROPOFOL 10 MG/ML EMULSION: Performed by: NURSE ANESTHETIST, CERTIFIED REGISTERED

## 2022-06-28 PROCEDURE — 25010000002 MIDAZOLAM PER 1 MG: Performed by: ANESTHESIOLOGY

## 2022-06-28 PROCEDURE — 25010000002 DEXAMETHASONE PER 1 MG: Performed by: ANESTHESIOLOGY

## 2022-06-28 PROCEDURE — 25010000002 FENTANYL CITRATE (PF) 100 MCG/2ML SOLUTION: Performed by: NURSE ANESTHETIST, CERTIFIED REGISTERED

## 2022-06-28 PROCEDURE — 82962 GLUCOSE BLOOD TEST: CPT

## 2022-06-28 RX ORDER — DEXAMETHASONE SODIUM PHOSPHATE 4 MG/ML
4 INJECTION, SOLUTION INTRA-ARTICULAR; INTRALESIONAL; INTRAMUSCULAR; INTRAVENOUS; SOFT TISSUE ONCE AS NEEDED
Status: COMPLETED | OUTPATIENT
Start: 2022-06-28 | End: 2022-06-28

## 2022-06-28 RX ORDER — FENTANYL CITRATE 50 UG/ML
INJECTION, SOLUTION INTRAMUSCULAR; INTRAVENOUS AS NEEDED
Status: DISCONTINUED | OUTPATIENT
Start: 2022-06-28 | End: 2022-06-28 | Stop reason: SURG

## 2022-06-28 RX ORDER — DROPERIDOL 2.5 MG/ML
0.62 INJECTION, SOLUTION INTRAMUSCULAR; INTRAVENOUS ONCE AS NEEDED
Status: DISCONTINUED | OUTPATIENT
Start: 2022-06-28 | End: 2022-06-28 | Stop reason: HOSPADM

## 2022-06-28 RX ORDER — LIDOCAINE HYDROCHLORIDE 10 MG/ML
0.5 INJECTION, SOLUTION EPIDURAL; INFILTRATION; INTRACAUDAL; PERINEURAL ONCE AS NEEDED
Status: DISCONTINUED | OUTPATIENT
Start: 2022-06-28 | End: 2022-06-28 | Stop reason: HOSPADM

## 2022-06-28 RX ORDER — SODIUM CHLORIDE, SODIUM LACTATE, POTASSIUM CHLORIDE, CALCIUM CHLORIDE 600; 310; 30; 20 MG/100ML; MG/100ML; MG/100ML; MG/100ML
1000 INJECTION, SOLUTION INTRAVENOUS CONTINUOUS
Status: DISCONTINUED | OUTPATIENT
Start: 2022-06-28 | End: 2022-06-28 | Stop reason: HOSPADM

## 2022-06-28 RX ORDER — MIDAZOLAM HYDROCHLORIDE 1 MG/ML
1 INJECTION INTRAMUSCULAR; INTRAVENOUS
Status: DISCONTINUED | OUTPATIENT
Start: 2022-06-28 | End: 2022-06-28 | Stop reason: HOSPADM

## 2022-06-28 RX ORDER — IBUPROFEN 600 MG/1
600 TABLET ORAL ONCE AS NEEDED
Status: DISCONTINUED | OUTPATIENT
Start: 2022-06-28 | End: 2022-06-28 | Stop reason: HOSPADM

## 2022-06-28 RX ORDER — FLUMAZENIL 0.1 MG/ML
0.2 INJECTION INTRAVENOUS AS NEEDED
Status: DISCONTINUED | OUTPATIENT
Start: 2022-06-28 | End: 2022-06-28 | Stop reason: HOSPADM

## 2022-06-28 RX ORDER — SODIUM CHLORIDE, SODIUM LACTATE, POTASSIUM CHLORIDE, CALCIUM CHLORIDE 600; 310; 30; 20 MG/100ML; MG/100ML; MG/100ML; MG/100ML
9 INJECTION, SOLUTION INTRAVENOUS CONTINUOUS
Status: DISCONTINUED | OUTPATIENT
Start: 2022-06-28 | End: 2022-06-28 | Stop reason: HOSPADM

## 2022-06-28 RX ORDER — LABETALOL HYDROCHLORIDE 5 MG/ML
5 INJECTION, SOLUTION INTRAVENOUS
Status: DISCONTINUED | OUTPATIENT
Start: 2022-06-28 | End: 2022-06-28 | Stop reason: HOSPADM

## 2022-06-28 RX ORDER — SODIUM CHLORIDE 0.9 % (FLUSH) 0.9 %
10 SYRINGE (ML) INJECTION AS NEEDED
Status: DISCONTINUED | OUTPATIENT
Start: 2022-06-28 | End: 2022-06-28 | Stop reason: HOSPADM

## 2022-06-28 RX ORDER — ONDANSETRON 2 MG/ML
4 INJECTION INTRAMUSCULAR; INTRAVENOUS ONCE AS NEEDED
Status: DISCONTINUED | OUTPATIENT
Start: 2022-06-28 | End: 2022-06-28 | Stop reason: HOSPADM

## 2022-06-28 RX ORDER — NALOXONE HCL 0.4 MG/ML
0.4 VIAL (ML) INJECTION AS NEEDED
Status: DISCONTINUED | OUTPATIENT
Start: 2022-06-28 | End: 2022-06-28 | Stop reason: HOSPADM

## 2022-06-28 RX ORDER — FENTANYL CITRATE 50 UG/ML
25 INJECTION, SOLUTION INTRAMUSCULAR; INTRAVENOUS
Status: DISCONTINUED | OUTPATIENT
Start: 2022-06-28 | End: 2022-06-28 | Stop reason: HOSPADM

## 2022-06-28 RX ORDER — SODIUM CHLORIDE 0.9 % (FLUSH) 0.9 %
3 SYRINGE (ML) INJECTION AS NEEDED
Status: DISCONTINUED | OUTPATIENT
Start: 2022-06-28 | End: 2022-06-28 | Stop reason: HOSPADM

## 2022-06-28 RX ORDER — SODIUM CHLORIDE 0.9 % (FLUSH) 0.9 %
10 SYRINGE (ML) INJECTION EVERY 12 HOURS SCHEDULED
Status: DISCONTINUED | OUTPATIENT
Start: 2022-06-28 | End: 2022-06-28 | Stop reason: HOSPADM

## 2022-06-28 RX ORDER — DEXTROSE MONOHYDRATE 25 G/50ML
12.5 INJECTION, SOLUTION INTRAVENOUS AS NEEDED
Status: DISCONTINUED | OUTPATIENT
Start: 2022-06-28 | End: 2022-06-28 | Stop reason: HOSPADM

## 2022-06-28 RX ORDER — EPHEDRINE SULFATE 50 MG/ML
INJECTION, SOLUTION INTRAVENOUS AS NEEDED
Status: DISCONTINUED | OUTPATIENT
Start: 2022-06-28 | End: 2022-06-28 | Stop reason: SURG

## 2022-06-28 RX ORDER — SCOLOPAMINE TRANSDERMAL SYSTEM 1 MG/1
1 PATCH, EXTENDED RELEASE TRANSDERMAL ONCE
Status: DISCONTINUED | OUTPATIENT
Start: 2022-06-28 | End: 2022-06-28 | Stop reason: HOSPADM

## 2022-06-28 RX ORDER — ONDANSETRON 4 MG/1
4 TABLET, FILM COATED ORAL EVERY 8 HOURS PRN
Qty: 10 TABLET | Refills: 0 | Status: SHIPPED | OUTPATIENT
Start: 2022-06-28 | End: 2022-11-14

## 2022-06-28 RX ORDER — OXYCODONE AND ACETAMINOPHEN 7.5; 325 MG/1; MG/1
2 TABLET ORAL EVERY 4 HOURS PRN
Status: DISCONTINUED | OUTPATIENT
Start: 2022-06-28 | End: 2022-06-28 | Stop reason: HOSPADM

## 2022-06-28 RX ORDER — MAGNESIUM HYDROXIDE 1200 MG/15ML
LIQUID ORAL AS NEEDED
Status: DISCONTINUED | OUTPATIENT
Start: 2022-06-28 | End: 2022-06-28 | Stop reason: HOSPADM

## 2022-06-28 RX ORDER — TRAMADOL HYDROCHLORIDE 50 MG/1
50 TABLET ORAL EVERY 4 HOURS PRN
Qty: 15 TABLET | Refills: 0 | Status: SHIPPED | OUTPATIENT
Start: 2022-06-28 | End: 2022-11-14

## 2022-06-28 RX ORDER — ONDANSETRON 2 MG/ML
INJECTION INTRAMUSCULAR; INTRAVENOUS AS NEEDED
Status: DISCONTINUED | OUTPATIENT
Start: 2022-06-28 | End: 2022-06-28 | Stop reason: SURG

## 2022-06-28 RX ORDER — PROPOFOL 10 MG/ML
VIAL (ML) INTRAVENOUS AS NEEDED
Status: DISCONTINUED | OUTPATIENT
Start: 2022-06-28 | End: 2022-06-28 | Stop reason: SURG

## 2022-06-28 RX ORDER — OXYCODONE AND ACETAMINOPHEN 10; 325 MG/1; MG/1
1 TABLET ORAL ONCE AS NEEDED
Status: COMPLETED | OUTPATIENT
Start: 2022-06-28 | End: 2022-06-28

## 2022-06-28 RX ADMIN — ONDANSETRON 4 MG: 2 INJECTION INTRAMUSCULAR; INTRAVENOUS at 09:05

## 2022-06-28 RX ADMIN — DEXAMETHASONE SODIUM PHOSPHATE 4 MG: 4 INJECTION INTRA-ARTICULAR; INTRALESIONAL; INTRAMUSCULAR; INTRAVENOUS; SOFT TISSUE at 08:44

## 2022-06-28 RX ADMIN — MIDAZOLAM HYDROCHLORIDE 1 MG: 1 INJECTION, SOLUTION INTRAMUSCULAR; INTRAVENOUS at 08:44

## 2022-06-28 RX ADMIN — FENTANYL CITRATE 100 MCG: 50 INJECTION, SOLUTION INTRAMUSCULAR; INTRAVENOUS at 09:05

## 2022-06-28 RX ADMIN — SODIUM CHLORIDE, POTASSIUM CHLORIDE, SODIUM LACTATE AND CALCIUM CHLORIDE 1000 ML: 600; 310; 30; 20 INJECTION, SOLUTION INTRAVENOUS at 09:49

## 2022-06-28 RX ADMIN — EPHEDRINE SULFATE 20 MG: 50 INJECTION INTRAVENOUS at 09:20

## 2022-06-28 RX ADMIN — OXYCODONE AND ACETAMINOPHEN 1 TABLET: 325; 10 TABLET ORAL at 10:00

## 2022-06-28 RX ADMIN — GLYCOPYRROLATE 0.2 MG: 0.2 INJECTION INTRAMUSCULAR; INTRAVENOUS at 09:14

## 2022-06-28 RX ADMIN — SODIUM CHLORIDE, POTASSIUM CHLORIDE, SODIUM LACTATE AND CALCIUM CHLORIDE 1000 ML: 600; 310; 30; 20 INJECTION, SOLUTION INTRAVENOUS at 07:09

## 2022-06-28 RX ADMIN — SCOPALAMINE 1 PATCH: 1 PATCH, EXTENDED RELEASE TRANSDERMAL at 08:44

## 2022-06-28 RX ADMIN — EPHEDRINE SULFATE 10 MG: 50 INJECTION INTRAVENOUS at 09:13

## 2022-06-28 RX ADMIN — PROPOFOL 150 MG: 10 INJECTION, EMULSION INTRAVENOUS at 09:05

## 2022-06-28 NOTE — BRIEF OP NOTE
CARPAL TUNNEL RELEASE  Progress Note    Heidi Dhaliwal  6/28/2022    Pre-op Diagnosis:   G56.03       Post-Op Diagnosis Codes:     * Right carpal tunnel syndrome [G56.01]    Procedure/CPT® Codes:  DE REVISE MEDIAN N/CARPAL TUNNEL SURG [57878]      Procedure(s):  RIGHT CARPAL TUNNEL RELEASE    Surgeon(s):  Zeyad Gilbert MD    Anesthesia: General with Block    Staff:   Circulator: Jani Velazquez RN; Javier Lopez RN  Scrub Person: Cristina Brand  Assistant: Aaron Pearl PA-C  Assistant: Aaron Pearl PA-C      Estimated Blood Loss: minimal    Urine Voided: * No values recorded between 6/28/2022  9:01 AM and 6/28/2022  9:22 AM *    Specimens:                None          Drains: * No LDAs found *    Findings: see op note         Complications: flakita    Assistant: Aaron Pearl PA-C  was responsible for performing the following activities: Closing and their skilled assistance was necessary for the success of this case.    Zeyad Gilbert MD     Date: 6/28/2022  Time: 09:22 CDT

## 2022-06-28 NOTE — ANESTHESIA PROCEDURE NOTES
Airway  Urgency: elective    Date/Time: 6/28/2022 9:05 AM  Airway not difficult    General Information and Staff    Patient location during procedure: OR  CRNA/CAA: Curt Gutierrez CRNA    Indications and Patient Condition    Preoxygenated: yes  Mask difficulty assessment: 0 - not attempted    Final Airway Details  Final airway type: supraglottic airway      Successful airway: I-gel  Size 3    Number of attempts at approach: 1  Assessment: lips, teeth, and gum same as pre-op

## 2022-06-28 NOTE — ANESTHESIA POSTPROCEDURE EVALUATION
Patient: Heidi Dhaliwal    Procedure Summary     Date: 06/28/22 Room / Location: [facility] OR [address] [place] [address] [facility] OR    Anesthesia Start: 0901 Anesthesia Stop:     Procedure: RIGHT CARPAL TUNNEL RELEASE (Right Wrist) Diagnosis:       Right carpal tunnel syndrome      (G56.03)    Surgeons: Zeyad Gilbert MD Provider: Curt Gutierrez CRNA    Anesthesia Type: general ASA Status: 2          Anesthesia Type: general    Vitals  Vitals Value Taken Time   /78 06/28/22 1000   Temp 97.1 °F (36.2 °C) 06/28/22 1000   Pulse 90 06/28/22 1007   Resp 17 06/28/22 1000   SpO2 93 % 06/28/22 1007   Vitals shown include unvalidated device data.        Post Anesthesia Care and Evaluation    Patient location during evaluation: PACU  Patient participation: complete - patient participated  Level of consciousness: awake and alert  Pain management: adequate    Airway patency: patent  Anesthetic complications: No anesthetic complications  PONV Status: none  Cardiovascular status: acceptable and hemodynamically stable  Respiratory status: acceptable  Hydration status: acceptable    Comments: Blood pressure 131/74, pulse 89, temperature 97.1 °F (36.2 °C), temperature source Temporal, resp. rate 16, SpO2 97 %, not currently breastfeeding.    Patient discharged from PACU based upon Joe score. Please see RN notes for further details

## 2022-06-28 NOTE — ANESTHESIA PREPROCEDURE EVALUATION
Anesthesia Evaluation     Patient summary reviewed   no history of anesthetic complications:  NPO Solid Status: > 8 hours             Airway   Mallampati: II  Dental    (+) upper dentures and lower dentures    Pulmonary    (-) COPD, asthma, sleep apnea, not a smoker  Cardiovascular   Exercise tolerance: excellent (>7 METS)    (+) hypertension, hyperlipidemia,   (-) pacemaker, past MI, angina, cardiac stents      Neuro/Psych  (-) seizures, TIA, CVA  GI/Hepatic/Renal/Endo    (+)   diabetes mellitus, thyroid problem hypothyroidism  (-) GERD, liver disease, no renal disease    Musculoskeletal     Abdominal    Substance History      OB/GYN          Other                        Anesthesia Plan    ASA 2     general     intravenous induction     Anesthetic plan, risks, benefits, and alternatives have been provided, discussed and informed consent has been obtained with: patient.        CODE STATUS:

## 2022-06-28 NOTE — H&P
Pt Name: Heidi Dhaliwal  MRN: [id number]  YOB: 1965  Date of evaluation: 6/28/2022    H&P including current review of systems was updated in the paper chart and/or the document previously scanned into the record.  There have been no significant changes or new problems since the original evaluation.  The patient's problems continue and indications for contemplated procedure have not changed.    Electronically signed by Zeyad Gilbert MD on 6/28/2022 at 09:01 CDT

## 2022-07-18 ENCOUNTER — OFFICE VISIT (OUTPATIENT)
Dept: INTERNAL MEDICINE | Facility: CLINIC | Age: 57
End: 2022-07-18

## 2022-07-18 VITALS
WEIGHT: 202 LBS | TEMPERATURE: 97.6 F | DIASTOLIC BLOOD PRESSURE: 82 MMHG | HEIGHT: 64 IN | RESPIRATION RATE: 16 BRPM | OXYGEN SATURATION: 98 % | HEART RATE: 99 BPM | SYSTOLIC BLOOD PRESSURE: 131 MMHG | BODY MASS INDEX: 34.49 KG/M2

## 2022-07-18 DIAGNOSIS — F41.9 ANXIETY: ICD-10-CM

## 2022-07-18 DIAGNOSIS — R42 DIZZINESS: ICD-10-CM

## 2022-07-18 DIAGNOSIS — E03.9 HYPOTHYROIDISM, UNSPECIFIED TYPE: Primary | ICD-10-CM

## 2022-07-18 PROCEDURE — 99213 OFFICE O/P EST LOW 20 MIN: CPT | Performed by: NURSE PRACTITIONER

## 2022-07-18 RX ORDER — ESCITALOPRAM OXALATE 10 MG/1
10 TABLET ORAL DAILY
Qty: 90 TABLET | Refills: 1 | Status: SHIPPED | OUTPATIENT
Start: 2022-07-18 | End: 2022-08-18 | Stop reason: SDUPTHER

## 2022-07-18 RX ORDER — MECLIZINE HYDROCHLORIDE 25 MG/1
25 TABLET ORAL 3 TIMES DAILY PRN
Qty: 90 TABLET | Refills: 0 | Status: SHIPPED | OUTPATIENT
Start: 2022-07-18

## 2022-07-18 NOTE — PROGRESS NOTES
Subjective     Chief Complaint   Patient presents with   • Hypothyroidism       History of Present Illness   Patient currently recovering from carpal tunnel surgery on right hand. Sutures till in place. Some weakness still in that hand.   She was started on synthroid 200mcg tablets on 04/07/2022. ON that date her TSH was 0.129 and T4 2.86. On 5/17 repeat TSH 30 and T4 0.6. She reports continued dizziness. Has not tried meclizine before.     Patient's PMR from outside medical facility reviewed and noted.    Review of Systems   Constitutional: Negative for chills and fever.   Respiratory: Negative for shortness of breath.    Cardiovascular: Negative for chest pain and palpitations.   Gastrointestinal: Negative for abdominal pain, constipation and nausea.   Genitourinary: Negative for difficulty urinating.   Neurological: Positive for dizziness.   Psychiatric/Behavioral: Positive for sleep disturbance. The patient is nervous/anxious.       Past Medical History:   Past Medical History:   Diagnosis Date   • Anxiety    • Diabetes mellitus (HCC)    • Hyperlipidemia    • Hypertension    • Hyperthyroidism    • Tremor    • Vasovagal syncope      Past Surgical History:  Past Surgical History:   Procedure Laterality Date   • CARPAL TUNNEL RELEASE Right 6/28/2022    Procedure: RIGHT CARPAL TUNNEL RELEASE;  Surgeon: Zeyad Gilbert MD;  Location: [facility];  Service: Orthopedics;  Laterality: Right;   • FOOT SURGERY Bilateral     HAMMERTOE AND BUNION REPAIR; 2017 & 2019   • THYROIDECTOMY, PARTIAL Right      Social History:  reports that she has never smoked. She has never used smokeless tobacco. She reports previous alcohol use. She reports that she does not use drugs.    Family History: family history includes Heart disease in her father; Pulmonary embolism in her mother; Ulcerative colitis in her brother.      Allergies:  Allergies   Allergen Reactions   • Metformin And Related Nausea And Vomiting   • Codeine  "Rash   • Morphine Rash   • Propranolol Hcl GI Intolerance     Medications:  Prior to Admission medications    Medication Sig Start Date End Date Taking? Authorizing Provider   aspirin 81 MG EC tablet Take 81 mg by mouth.    Provider, MD Paulina   atorvastatin (LIPITOR) 20 MG tablet Take 1 tablet by mouth Daily. 4/7/22   Mary Matson APRN   baclofen (LIORESAL) 10 MG tablet Take 1 tablet by mouth 2 (Two) Times a Day. 5/16/22   Mary Matson APRN   escitalopram (LEXAPRO) 10 MG tablet Take 0.5 tablets by mouth Daily. 5/26/22   Mary Matson APRN   levothyroxine (SYNTHROID, LEVOTHROID) 200 MCG tablet Take 1 tablet by mouth Daily. 4/7/22   Mary Matson APRN   losartan-hydrochlorothiazide (HYZAAR) 50-12.5 MG per tablet Take 1 tablet by mouth Daily. 4/7/22   Mary Matson APRN   metoprolol tartrate (LOPRESSOR) 25 MG tablet Take 1 tablet by mouth 2 (Two) Times a Day. 4/7/22   Mary Matson APRN   ondansetron (Zofran) 4 MG tablet Take 1 tablet by mouth Every 8 (Eight) Hours As Needed for Nausea or Vomiting. 6/28/22   Zeyad Gilbert MD   traMADol (ULTRAM) 50 MG tablet Take 1 tablet by mouth Every 4 (Four) Hours As Needed for Moderate Pain . 6/28/22   Zeyad Gilbert MD   Trulicity 1.5 MG/0.5ML solution pen-injector INJECT 1.5 MG (ONE PEN) SUB Q AS DIRECTED ONCE WEEKLY. 6/13/22   Mary Matson APRN       Objective     Vital Signs: /82 (BP Location: Left arm, Patient Position: Sitting, Cuff Size: Adult)   Pulse 99   Temp 97.6 °F (36.4 °C)   Resp 16   Ht 162.5 cm (63.98\")   Wt 91.6 kg (202 lb)   SpO2 98%   BMI 34.69 kg/m²   Physical Exam  Vitals reviewed.   Constitutional:       Appearance: She is well-developed.   HENT:      Head: Normocephalic and atraumatic.   Eyes:      Pupils: Pupils are equal, round, and reactive to light.   Neck:      Vascular: No JVD.   Cardiovascular:      Rate and Rhythm: Normal rate and " regular rhythm.   Pulmonary:      Effort: Pulmonary effort is normal.      Breath sounds: Normal breath sounds.   Abdominal:      General: Bowel sounds are normal.      Palpations: Abdomen is soft.   Musculoskeletal:         General: No deformity.      Cervical back: Normal range of motion and neck supple.   Lymphadenopathy:      Cervical: No cervical adenopathy.   Skin:     General: Skin is warm and dry.      Comments: Sutures right base of hand   Neurological:      Mental Status: She is alert and oriented to person, place, and time.   Psychiatric:         Behavior: Behavior normal.         Thought Content: Thought content normal.         Judgment: Judgment normal.         BMI is >= 30 and <35. (Class 1 Obesity). The following options were offered after discussion;: exercise counseling/recommendations      Results Reviewed:  Glucose   Date Value Ref Range Status   06/24/2022 113 (H) 65 - 99 mg/dL Final   10/15/2021 121 (H) 74 - 109 mg/dL Final     BUN   Date Value Ref Range Status   06/24/2022 13 6 - 20 mg/dL Final   10/15/2021 13 6 - 20 mg/dL Final     Creatinine   Date Value Ref Range Status   06/24/2022 1.00 0.57 - 1.00 mg/dL Final   10/15/2021 0.8 0.5 - 0.9 mg/dL Final     Sodium   Date Value Ref Range Status   06/24/2022 140 136 - 145 mmol/L Final   10/15/2021 138 136 - 145 mmol/L Final     Potassium   Date Value Ref Range Status   06/24/2022 4.3 3.5 - 5.2 mmol/L Final   10/15/2021 4.1 3.5 - 5.0 mmol/L Final     Chloride   Date Value Ref Range Status   06/24/2022 102 98 - 107 mmol/L Final   10/15/2021 100 98 - 111 mmol/L Final     CO2   Date Value Ref Range Status   06/24/2022 32.0 (H) 22.0 - 29.0 mmol/L Final   10/15/2021 28 22 - 29 mmol/L Final     Calcium   Date Value Ref Range Status   06/24/2022 9.4 8.6 - 10.5 mg/dL Final   10/15/2021 9.3 8.6 - 10.0 mg/dL Final     ALT (SGPT)   Date Value Ref Range Status   05/17/2022 32 0 - 32 IU/L Final   10/15/2021 31 5 - 33 U/L Final     AST (SGOT)   Date Value Ref  Range Status   05/17/2022 27 0 - 40 IU/L Final   10/15/2021 25 5 - 32 U/L Final     WBC   Date Value Ref Range Status   06/24/2022 5.31 3.40 - 10.80 10*3/mm3 Final   05/17/2022 6.0 3.4 - 10.8 x10E3/uL Final   10/15/2021 5.8 4.8 - 10.8 K/uL Final     Hematocrit   Date Value Ref Range Status   06/24/2022 43.3 34.0 - 46.6 % Final   10/15/2021 43.2 37.0 - 47.0 % Final     Platelets   Date Value Ref Range Status   06/24/2022 250 140 - 450 10*3/mm3 Final   10/15/2021 274 130 - 400 K/uL Final     Triglycerides   Date Value Ref Range Status   05/17/2022 218 (H) 0 - 149 mg/dL Final   10/15/2021 134 0 - 149 mg/dL Final     HDL Cholesterol   Date Value Ref Range Status   05/17/2022 36 (L) >39 mg/dL Final   10/15/2021 41 (L) 65 - 121 mg/dL Final     Comment:     VALUES>60 MG/DL ARE ASSOCIATED WITH A DECREASED RISK OF  ATHEROSCLEROTIC CARDIOVASCULAR DISEASE     LDL Cholesterol    Date Value Ref Range Status   10/15/2021 95 <100 mg/dL Final     Comment:     <100 MG/DL=OPITIMAL    100-129 MG/DL=DESIRABLE    130-159 MG/DL BORDERLINE=INCREASED RISK OF ATHEROSCLEROTIC  CARDIOVASCULAR DISEASE    > OR = 160 MG/DL=ASSOCIATED WITH AN INCREASE RISK OF  ATHEROSCLEROTIC CARDIOVASCULAR DISEASE     LDL Chol Calc (NIH)   Date Value Ref Range Status   05/17/2022 110 (H) 0 - 99 mg/dL Final     Hemoglobin A1C   Date Value Ref Range Status   04/07/2022 6.2 (H) 4.8 - 5.6 % Final     Comment:              Prediabetes: 5.7 - 6.4           Diabetes: >6.4           Glycemic control for adults with diabetes: <7.0     10/15/2021 6.2 (H) 4.0 - 6.0 % Final     Comment:     HbA1c levels >6% are an indication of hyperglycemia during the preceding 2  to 3 months or longer.    HbA1c levels may reach 20% or higher in poorly controlled diabetes.  Therapeutic action is suggested at levels above 8%.    Diabetes patients with HbA1c levels below 7% meet the goal of the American  Diabetes Association.    HbA1c levels below the established reference range may indicate  recent  episodes of hypoglycemia, the presence of Hb variants, or shortened lifetime  of erythrocytes.          Assessment / Plan     Assessment/Plan:  Diagnoses and all orders for this visit:    1. Hypothyroidism, unspecified type (Primary)  -     T4, Free  -     TSH    2. Anxiety  -     escitalopram (Lexapro) 10 MG tablet; Take 1 tablet by mouth Daily.  Dispense: 90 tablet; Refill: 1    3. Dizziness  -     meclizine (ANTIVERT) 25 MG tablet; Take 1 tablet by mouth 3 (Three) Times a Day As Needed for Dizziness.  Dispense: 90 tablet; Refill: 0      Return in about 3 months (around 10/18/2022). unless patient needs to be seen sooner or acute issues arise.    Code Status: Full.     I have discussed the patient results/orders and and plan/recommendation with them at today's visit.      Mary Matson, APRN   07/18/2022

## 2022-07-19 DIAGNOSIS — E03.9 HYPOTHYROIDISM, UNSPECIFIED TYPE: Primary | ICD-10-CM

## 2022-07-19 LAB
T4 FREE SERPL-MCNC: 1.72 NG/DL (ref 0.82–1.77)
TSH SERPL DL<=0.005 MIU/L-ACNC: 0.1 UIU/ML (ref 0.45–4.5)

## 2022-07-19 NOTE — PROGRESS NOTES
Attempted to call pt to get apt for labs set up. No answer. Can you please order the labs you want repeated? Thanks

## 2022-07-21 DIAGNOSIS — E03.9 HYPOTHYROIDISM, UNSPECIFIED TYPE: Primary | ICD-10-CM

## 2022-07-26 ENCOUNTER — TRANSCRIBE ORDERS (OUTPATIENT)
Dept: LAB | Facility: HOSPITAL | Age: 57
End: 2022-07-26

## 2022-07-26 DIAGNOSIS — Z11.59 SCREENING FOR VIRAL DISEASE: Primary | ICD-10-CM

## 2022-07-27 ENCOUNTER — PRE-ADMISSION TESTING (OUTPATIENT)
Dept: PREADMISSION TESTING | Facility: HOSPITAL | Age: 57
End: 2022-07-27

## 2022-07-27 VITALS
DIASTOLIC BLOOD PRESSURE: 70 MMHG | HEIGHT: 62 IN | SYSTOLIC BLOOD PRESSURE: 149 MMHG | BODY MASS INDEX: 37.61 KG/M2 | RESPIRATION RATE: 16 BRPM | HEART RATE: 102 BPM | WEIGHT: 204.37 LBS | OXYGEN SATURATION: 97 %

## 2022-07-29 ENCOUNTER — LAB (OUTPATIENT)
Dept: LAB | Facility: HOSPITAL | Age: 57
End: 2022-07-29

## 2022-07-29 LAB — SARS-COV-2 ORF1AB RESP QL NAA+PROBE: NOT DETECTED

## 2022-07-29 PROCEDURE — U0004 COV-19 TEST NON-CDC HGH THRU: HCPCS | Performed by: ORTHOPAEDIC SURGERY

## 2022-07-29 PROCEDURE — C9803 HOPD COVID-19 SPEC COLLECT: HCPCS

## 2022-07-30 PROBLEM — G56.02 LEFT CARPAL TUNNEL SYNDROME: Status: ACTIVE | Noted: 2022-07-30

## 2022-08-02 ENCOUNTER — ANESTHESIA EVENT (OUTPATIENT)
Dept: PERIOP | Facility: HOSPITAL | Age: 57
End: 2022-08-02

## 2022-08-02 ENCOUNTER — ANESTHESIA (OUTPATIENT)
Dept: PERIOP | Facility: HOSPITAL | Age: 57
End: 2022-08-02

## 2022-08-02 ENCOUNTER — HOSPITAL ENCOUNTER (OUTPATIENT)
Facility: HOSPITAL | Age: 57
Setting detail: HOSPITAL OUTPATIENT SURGERY
Discharge: HOME | End: 2022-08-02
Attending: ORTHOPAEDIC SURGERY | Admitting: ORTHOPAEDIC SURGERY

## 2022-08-02 VITALS
RESPIRATION RATE: 16 BRPM | HEART RATE: 101 BPM | TEMPERATURE: 97.6 F | SYSTOLIC BLOOD PRESSURE: 155 MMHG | DIASTOLIC BLOOD PRESSURE: 73 MMHG | OXYGEN SATURATION: 94 %

## 2022-08-02 DIAGNOSIS — G56.02 LEFT CARPAL TUNNEL SYNDROME: Primary | ICD-10-CM

## 2022-08-02 LAB
GLUCOSE BLDC GLUCOMTR-MCNC: 100 MG/DL (ref 70–130)
GLUCOSE BLDC GLUCOMTR-MCNC: 95 MG/DL (ref 70–130)

## 2022-08-02 PROCEDURE — 25010000002 CEFAZOLIN PER 500 MG: Performed by: ORTHOPAEDIC SURGERY

## 2022-08-02 PROCEDURE — 25010000002 PROPOFOL 10 MG/ML EMULSION: Performed by: NURSE ANESTHETIST, CERTIFIED REGISTERED

## 2022-08-02 PROCEDURE — 82962 GLUCOSE BLOOD TEST: CPT

## 2022-08-02 PROCEDURE — 25010000002 ONDANSETRON PER 1 MG: Performed by: NURSE ANESTHETIST, CERTIFIED REGISTERED

## 2022-08-02 PROCEDURE — 25010000002 FENTANYL CITRATE (PF) 100 MCG/2ML SOLUTION: Performed by: NURSE ANESTHETIST, CERTIFIED REGISTERED

## 2022-08-02 RX ORDER — HYDROCODONE BITARTRATE AND ACETAMINOPHEN 5; 325 MG/1; MG/1
1 TABLET ORAL EVERY 6 HOURS PRN
Qty: 15 TABLET | Refills: 0 | Status: SHIPPED | OUTPATIENT
Start: 2022-08-02 | End: 2022-11-14

## 2022-08-02 RX ORDER — PROPOFOL 10 MG/ML
VIAL (ML) INTRAVENOUS AS NEEDED
Status: DISCONTINUED | OUTPATIENT
Start: 2022-08-02 | End: 2022-08-02 | Stop reason: SURG

## 2022-08-02 RX ORDER — LIDOCAINE HYDROCHLORIDE 10 MG/ML
0.5 INJECTION, SOLUTION EPIDURAL; INFILTRATION; INTRACAUDAL; PERINEURAL ONCE AS NEEDED
Status: DISCONTINUED | OUTPATIENT
Start: 2022-08-02 | End: 2022-08-02 | Stop reason: HOSPADM

## 2022-08-02 RX ORDER — LABETALOL HYDROCHLORIDE 5 MG/ML
5 INJECTION, SOLUTION INTRAVENOUS
Status: DISCONTINUED | OUTPATIENT
Start: 2022-08-02 | End: 2022-08-02 | Stop reason: HOSPADM

## 2022-08-02 RX ORDER — SODIUM CHLORIDE, SODIUM LACTATE, POTASSIUM CHLORIDE, CALCIUM CHLORIDE 600; 310; 30; 20 MG/100ML; MG/100ML; MG/100ML; MG/100ML
1000 INJECTION, SOLUTION INTRAVENOUS CONTINUOUS
Status: DISCONTINUED | OUTPATIENT
Start: 2022-08-02 | End: 2022-08-02 | Stop reason: HOSPADM

## 2022-08-02 RX ORDER — EPHEDRINE SULFATE 50 MG/ML
INJECTION, SOLUTION INTRAVENOUS AS NEEDED
Status: DISCONTINUED | OUTPATIENT
Start: 2022-08-02 | End: 2022-08-02 | Stop reason: SURG

## 2022-08-02 RX ORDER — SODIUM CHLORIDE 0.9 % (FLUSH) 0.9 %
3 SYRINGE (ML) INJECTION AS NEEDED
Status: DISCONTINUED | OUTPATIENT
Start: 2022-08-02 | End: 2022-08-02 | Stop reason: HOSPADM

## 2022-08-02 RX ORDER — SODIUM CHLORIDE 0.9 % (FLUSH) 0.9 %
10 SYRINGE (ML) INJECTION EVERY 12 HOURS SCHEDULED
Status: DISCONTINUED | OUTPATIENT
Start: 2022-08-02 | End: 2022-08-02 | Stop reason: HOSPADM

## 2022-08-02 RX ORDER — SODIUM CHLORIDE 0.9 % (FLUSH) 0.9 %
3 SYRINGE (ML) INJECTION EVERY 12 HOURS SCHEDULED
Status: DISCONTINUED | OUTPATIENT
Start: 2022-08-02 | End: 2022-08-02 | Stop reason: HOSPADM

## 2022-08-02 RX ORDER — FLUMAZENIL 0.1 MG/ML
0.2 INJECTION INTRAVENOUS AS NEEDED
Status: DISCONTINUED | OUTPATIENT
Start: 2022-08-02 | End: 2022-08-02 | Stop reason: HOSPADM

## 2022-08-02 RX ORDER — ONDANSETRON 2 MG/ML
4 INJECTION INTRAMUSCULAR; INTRAVENOUS ONCE AS NEEDED
Status: DISCONTINUED | OUTPATIENT
Start: 2022-08-02 | End: 2022-08-02 | Stop reason: HOSPADM

## 2022-08-02 RX ORDER — SODIUM CHLORIDE, SODIUM LACTATE, POTASSIUM CHLORIDE, CALCIUM CHLORIDE 600; 310; 30; 20 MG/100ML; MG/100ML; MG/100ML; MG/100ML
100 INJECTION, SOLUTION INTRAVENOUS CONTINUOUS
Status: DISCONTINUED | OUTPATIENT
Start: 2022-08-02 | End: 2022-08-02 | Stop reason: HOSPADM

## 2022-08-02 RX ORDER — OXYCODONE AND ACETAMINOPHEN 10; 325 MG/1; MG/1
1 TABLET ORAL ONCE AS NEEDED
Status: DISCONTINUED | OUTPATIENT
Start: 2022-08-02 | End: 2022-08-02 | Stop reason: HOSPADM

## 2022-08-02 RX ORDER — MIDAZOLAM HYDROCHLORIDE 1 MG/ML
1 INJECTION INTRAMUSCULAR; INTRAVENOUS
Status: DISCONTINUED | OUTPATIENT
Start: 2022-08-02 | End: 2022-08-02 | Stop reason: HOSPADM

## 2022-08-02 RX ORDER — FENTANYL CITRATE 50 UG/ML
INJECTION, SOLUTION INTRAMUSCULAR; INTRAVENOUS AS NEEDED
Status: DISCONTINUED | OUTPATIENT
Start: 2022-08-02 | End: 2022-08-02 | Stop reason: SURG

## 2022-08-02 RX ORDER — SODIUM CHLORIDE 0.9 % (FLUSH) 0.9 %
10 SYRINGE (ML) INJECTION AS NEEDED
Status: DISCONTINUED | OUTPATIENT
Start: 2022-08-02 | End: 2022-08-02 | Stop reason: HOSPADM

## 2022-08-02 RX ORDER — MAGNESIUM HYDROXIDE 1200 MG/15ML
LIQUID ORAL AS NEEDED
Status: DISCONTINUED | OUTPATIENT
Start: 2022-08-02 | End: 2022-08-02 | Stop reason: HOSPADM

## 2022-08-02 RX ORDER — BUPIVACAINE HCL/0.9 % NACL/PF 0.1 %
2 PLASTIC BAG, INJECTION (ML) EPIDURAL ONCE
Status: COMPLETED | OUTPATIENT
Start: 2022-08-02 | End: 2022-08-02

## 2022-08-02 RX ORDER — LIDOCAINE HYDROCHLORIDE 20 MG/ML
INJECTION, SOLUTION EPIDURAL; INFILTRATION; INTRACAUDAL; PERINEURAL AS NEEDED
Status: DISCONTINUED | OUTPATIENT
Start: 2022-08-02 | End: 2022-08-02 | Stop reason: SURG

## 2022-08-02 RX ORDER — SODIUM CHLORIDE, SODIUM LACTATE, POTASSIUM CHLORIDE, CALCIUM CHLORIDE 600; 310; 30; 20 MG/100ML; MG/100ML; MG/100ML; MG/100ML
100 INJECTION, SOLUTION INTRAVENOUS CONTINUOUS PRN
Status: DISCONTINUED | OUTPATIENT
Start: 2022-08-02 | End: 2022-08-02 | Stop reason: HOSPADM

## 2022-08-02 RX ORDER — OXYCODONE AND ACETAMINOPHEN 7.5; 325 MG/1; MG/1
2 TABLET ORAL EVERY 4 HOURS PRN
Status: DISCONTINUED | OUTPATIENT
Start: 2022-08-02 | End: 2022-08-02 | Stop reason: HOSPADM

## 2022-08-02 RX ORDER — IBUPROFEN 600 MG/1
600 TABLET ORAL ONCE AS NEEDED
Status: DISCONTINUED | OUTPATIENT
Start: 2022-08-02 | End: 2022-08-02 | Stop reason: HOSPADM

## 2022-08-02 RX ORDER — DROPERIDOL 2.5 MG/ML
0.62 INJECTION, SOLUTION INTRAMUSCULAR; INTRAVENOUS ONCE AS NEEDED
Status: DISCONTINUED | OUTPATIENT
Start: 2022-08-02 | End: 2022-08-02 | Stop reason: HOSPADM

## 2022-08-02 RX ORDER — SODIUM CHLORIDE 0.9 % (FLUSH) 0.9 %
3-10 SYRINGE (ML) INJECTION AS NEEDED
Status: DISCONTINUED | OUTPATIENT
Start: 2022-08-02 | End: 2022-08-02 | Stop reason: HOSPADM

## 2022-08-02 RX ORDER — NALOXONE HCL 0.4 MG/ML
0.4 VIAL (ML) INJECTION AS NEEDED
Status: DISCONTINUED | OUTPATIENT
Start: 2022-08-02 | End: 2022-08-02 | Stop reason: HOSPADM

## 2022-08-02 RX ORDER — ACETAMINOPHEN 500 MG
1000 TABLET ORAL ONCE
Status: COMPLETED | OUTPATIENT
Start: 2022-08-02 | End: 2022-08-02

## 2022-08-02 RX ORDER — FENTANYL CITRATE 50 UG/ML
25 INJECTION, SOLUTION INTRAMUSCULAR; INTRAVENOUS
Status: DISCONTINUED | OUTPATIENT
Start: 2022-08-02 | End: 2022-08-02 | Stop reason: HOSPADM

## 2022-08-02 RX ORDER — ONDANSETRON 2 MG/ML
INJECTION INTRAMUSCULAR; INTRAVENOUS AS NEEDED
Status: DISCONTINUED | OUTPATIENT
Start: 2022-08-02 | End: 2022-08-02 | Stop reason: SURG

## 2022-08-02 RX ADMIN — Medication 2 G: at 13:45

## 2022-08-02 RX ADMIN — ONDANSETRON 4 MG: 2 INJECTION INTRAMUSCULAR; INTRAVENOUS at 13:55

## 2022-08-02 RX ADMIN — EPHEDRINE SULFATE 20 MG: 50 INJECTION INTRAVENOUS at 13:46

## 2022-08-02 RX ADMIN — PROPOFOL 200 MG: 10 INJECTION, EMULSION INTRAVENOUS at 13:40

## 2022-08-02 RX ADMIN — FENTANYL CITRATE 50 MCG: 50 INJECTION, SOLUTION INTRAMUSCULAR; INTRAVENOUS at 13:49

## 2022-08-02 RX ADMIN — SODIUM CHLORIDE, POTASSIUM CHLORIDE, SODIUM LACTATE AND CALCIUM CHLORIDE 1000 ML: 600; 310; 30; 20 INJECTION, SOLUTION INTRAVENOUS at 10:54

## 2022-08-02 RX ADMIN — FENTANYL CITRATE 50 MCG: 50 INJECTION, SOLUTION INTRAMUSCULAR; INTRAVENOUS at 13:56

## 2022-08-02 RX ADMIN — LIDOCAINE HYDROCHLORIDE 100 MG: 20 INJECTION, SOLUTION EPIDURAL; INFILTRATION; INTRACAUDAL; PERINEURAL at 13:40

## 2022-08-02 RX ADMIN — ACETAMINOPHEN 1000 MG: 500 TABLET ORAL at 11:39

## 2022-08-02 NOTE — ANESTHESIA PROCEDURE NOTES
Airway  Urgency: elective    Date/Time: 8/2/2022 1:41 PM  Airway not difficult    General Information and Staff    Patient location during procedure: OR  CRNA/CAA: Dipesh Hough CRNA    Indications and Patient Condition  Indications for airway management: airway protection    Preoxygenated: yes  Mask difficulty assessment: 1 - vent by mask    Final Airway Details  Final airway type: supraglottic airway      Successful airway: classic and I-gel  Size 3    Number of attempts at approach: 1  Assessment: lips, teeth, and gum same as pre-op and atraumatic intubation

## 2022-08-02 NOTE — H&P
Pt Name: Heidi Dhaliwal  MRN: [id number]  YOB: 1965  Date of evaluation: 8/2/2022    H&P including current review of systems was updated in the paper chart and/or the document previously scanned into the record.  There have been no significant changes or new problems since the original evaluation.  The patient's problems continue and indications for contemplated procedure have not changed.    Electronically signed by Zeyad Gilbert MD on 8/2/2022 at 1330

## 2022-08-02 NOTE — ANESTHESIA POSTPROCEDURE EVALUATION
Patient: Heidi Dhaliwal    Procedure Summary     Date: 08/02/22 Room / Location: [facility] OR  /  [facility] OR    Anesthesia Start: 1338 Anesthesia Stop: 1404    Procedure: LEFT CARPAL TUNNEL RELEASE (Left Wrist) Diagnosis:       Left carpal tunnel syndrome      (G56.03)    Surgeons: Zeyad Gilbert MD Provider: Dipesh Hough CRNA    Anesthesia Type: general ASA Status: 2          Anesthesia Type: general    Vitals  Vitals Value Taken Time   /82 08/02/22 1435   Temp 97.6 °F (36.4 °C) 08/02/22 1430   Pulse 95 08/02/22 1435   Resp 14 08/02/22 1430   SpO2 99 % 08/02/22 1435   Vitals shown include unvalidated device data.        Post Anesthesia Care and Evaluation    Patient location during evaluation: PACU  Patient participation: complete - patient participated  Level of consciousness: awake and alert  Pain management: adequate    Airway patency: patent  Anesthetic complications: No anesthetic complications    Cardiovascular status: acceptable  Respiratory status: acceptable  Hydration status: acceptable    Comments: Blood pressure 155/73, pulse 101, temperature 97.6 °F (36.4 °C), temperature source Temporal, resp. rate 16, SpO2 94 %, not currently breastfeeding.    Pt discharged from PACU based on katie score >8

## 2022-08-02 NOTE — BRIEF OP NOTE
CARPAL TUNNEL RELEASE  Progress Note    Heidi Dhaliwal  8/2/2022    Pre-op Diagnosis:   G56.03       Post-Op Diagnosis Codes:     * Left carpal tunnel syndrome [G56.02]    Procedure/CPT® Codes:  IA REVISE MEDIAN N/CARPAL TUNNEL SURG [93157]      Procedure(s):  LEFT CARPAL TUNNEL RELEASE    Surgeon(s):  Zeyad Gilbert MD    Anesthesia: General    Staff:   Circulator: Angelic Campuzano RN  Scrub Person: Jani Hines         Estimated Blood Loss: minimal    Urine Voided: * No values recorded between 8/2/2022  1:48 PM and 8/2/2022  1:59 PM *    Specimens:                None          Drains: * No LDAs found *    Findings: see op note         Complications: none          Zeyad Gilbert MD     Date: 8/2/2022  Time: 14:02 CDT

## 2022-08-18 ENCOUNTER — OFFICE VISIT (OUTPATIENT)
Dept: INTERNAL MEDICINE | Facility: CLINIC | Age: 57
End: 2022-08-18

## 2022-08-18 VITALS
RESPIRATION RATE: 16 BRPM | WEIGHT: 202 LBS | DIASTOLIC BLOOD PRESSURE: 82 MMHG | HEART RATE: 85 BPM | HEIGHT: 62 IN | TEMPERATURE: 98.2 F | SYSTOLIC BLOOD PRESSURE: 132 MMHG | OXYGEN SATURATION: 99 % | BODY MASS INDEX: 37.17 KG/M2

## 2022-08-18 DIAGNOSIS — F41.9 ANXIETY: ICD-10-CM

## 2022-08-18 DIAGNOSIS — F51.01 PRIMARY INSOMNIA: ICD-10-CM

## 2022-08-18 DIAGNOSIS — E03.9 HYPOTHYROIDISM, UNSPECIFIED TYPE: Primary | ICD-10-CM

## 2022-08-18 DIAGNOSIS — E11.9 TYPE 2 DIABETES MELLITUS WITHOUT COMPLICATION, WITHOUT LONG-TERM CURRENT USE OF INSULIN: ICD-10-CM

## 2022-08-18 PROCEDURE — 99213 OFFICE O/P EST LOW 20 MIN: CPT | Performed by: NURSE PRACTITIONER

## 2022-08-18 RX ORDER — ESCITALOPRAM OXALATE 10 MG/1
10 TABLET ORAL DAILY
Qty: 90 TABLET | Refills: 1 | Status: SHIPPED | OUTPATIENT
Start: 2022-08-18 | End: 2022-10-13

## 2022-08-18 RX ORDER — TRAZODONE HYDROCHLORIDE 50 MG/1
25 TABLET ORAL NIGHTLY
Qty: 30 TABLET | Refills: 1 | Status: SHIPPED | OUTPATIENT
Start: 2022-08-18 | End: 2022-12-19 | Stop reason: SDUPTHER

## 2022-08-18 RX ORDER — DULAGLUTIDE 1.5 MG/.5ML
1.5 INJECTION, SOLUTION SUBCUTANEOUS WEEKLY
Qty: 4 ML | Refills: 0 | Status: SHIPPED | OUTPATIENT
Start: 2022-08-18 | End: 2022-09-06

## 2022-08-18 NOTE — PROGRESS NOTES
"        Subjective     Chief Complaint   Patient presents with   • Hypothyroidism   • Syncope     Has spells when she get hot       History of Present Illness  Heidi Dhaliwal is a 56-year-old female, date of birth 1965.    History of Present Illness  Heidi presents today for a routine follow-up.  The patient reports on 08/01/2022 she received surgical procedure for her left hand, and will have an appointment tomorrow 08/19/2022 for suture removal. She states that her right hand received a surgical procedure a few months ago. She reports her hand has improved regarding numbness, although she does have slight pain, and is informed she will require extra healing time as she is diabetic. She states she is concerned regarding a knot that has developed and will inquire during her appointment tomorrow.    She reports she has been utilizing Synthroid for a long period of time and it has been going well, and mentions that she began visiting [facility] in 04/2022. She states she has been referred to an endocrinologist in [place], as she was unable to receive Synthroid regularly. She adds that her appointment with the endocrinologist is 11/14/2022.     She reports she was diagnosed with vasovagal syncope and has heat intolerance. She states yesterday she mowed her lawn and it felt pleasant being outside, afterwards she then decided to move her dog kennel, and experienced an episode of syncope resulting her to end up on the ground. She states she attempted to figure out what she had been doing when waking and then upon realization had 2 episodes of emesis.     She reports having dizziness when waking up in the mornings, \"as soon as I open my eyes the room starts spinning.\" She states this does not occur every day, and will have dizziness a couple of times a week to 4 or 5 times a month. She reports she did not sleep well last night, and does not sleep well often, averaging 3 to 4 hours a night. She states she is able " to sleep when her 7-year-old grandson stays with her, however, she wakes up exhausted. She added that her  passed away 7-years ago and caring for her grandson helped her during the grieving process. She states she visited a therapist in the past and was advised that her brain was not shutting down at night, which was concerning to the patient.      She continues to utilize Lexapro 5 mg daily, and reports she was advised by me to increase her dosage to 10 mg, utilizing 2 of the 5 mg to see how she felt. She states that as she increased her dosage, a prescription for Lexapro 10 mg once daily was ordered. She states [facility] provided her with the 10 mg prescription and advised her to cut it in half. She has been utilizing the 10 mg once daily and reports it has been going well, she still has bad days but not as often as she previously did.     She reports she has been prescribed meclizine for dizziness, and notes she has not had a spell severe enough to utilize it. She states her blood glucose level is about 126 mg/dL in the mornings, and monitors it frequently as she has several family members with diabetes. She adds that her 42-year-old nephew recently passed away from diabetes.     She reports she had her thyroid removed in 2019, and confirms needing a prescription refill for Trulicity 1.5 mg. She denies any diarrhea or sensations of tingling in her feet. She reports having issues falling asleep as well as staying asleep, and has not utilized any medication to assist with her insomnia. She expressed she has a low tolerance for medications, lives alone, and is afraid she will not wake up in the event of an emergency.     Review of Systems   Otherwise complete ROS reviewed and negative except as mentioned in the HPI.    Past Medical History:   Past Medical History:   Diagnosis Date   • Anxiety    • Diabetes mellitus (HCC)    • Hyperlipidemia    • Hypertension    • Hyperthyroidism    • Tremor    • Vasovagal  syncope      Past Surgical History:  Past Surgical History:   Procedure Laterality Date   • CARPAL TUNNEL RELEASE Right 06/28/2022    Procedure: RIGHT CARPAL TUNNEL RELEASE;  Surgeon: Zeyad Gilbert MD;  Location:  [facility] OR;  Service: Orthopedics;  Laterality: Right;   • CARPAL TUNNEL RELEASE Left 8/2/2022    Procedure: LEFT CARPAL TUNNEL RELEASE;  Surgeon: Zeyad Gilbert MD;  Location:  [facility] OR;  Service: Orthopedics;  Laterality: Left;   • FOOT SURGERY Bilateral     HAMMERTOE AND BUNION REPAIR; 2017 & 2019   • HYSTERECTOMY     • THYROIDECTOMY, PARTIAL Right      Social History:  reports that she has never smoked. She has never used smokeless tobacco. She reports previous alcohol use. She reports that she does not use drugs.    Family History: family history includes Heart disease in her father; Pulmonary embolism in her mother; Ulcerative colitis in her brother.      Allergies:  Allergies   Allergen Reactions   • Metformin And Related Nausea And Vomiting   • Codeine Rash   • Morphine Rash   • Propranolol Hcl GI Intolerance     Medications:  Prior to Admission medications    Medication Sig Start Date End Date Taking? Authorizing Provider   Dulaglutide (Trulicity) 1.5 MG/0.5ML solution pen-injector Inject 1.5 mg under the skin into the appropriate area as directed 1 (One) Time Per Week. 8/18/22  Yes Mary Matson APRN   escitalopram (Lexapro) 10 MG tablet Take 1 tablet by mouth Daily. 8/18/22  Yes Mary Matson APRN   aspirin 81 MG EC tablet Take 81 mg by mouth.    Provider, MD Paulina   atorvastatin (LIPITOR) 20 MG tablet Take 1 tablet by mouth Daily. 4/7/22   Mary Matson APRN   baclofen (LIORESAL) 10 MG tablet Take 1 tablet by mouth 2 (Two) Times a Day. 5/16/22   Mary Matson APRN   HYDROcodone-acetaminophen (NORCO) 5-325 MG per tablet Take 1 tablet by mouth Every 6 (Six) Hours As Needed for Moderate Pain . 8/2/22   Zeyad Gilbert MD    levothyroxine (SYNTHROID, LEVOTHROID) 200 MCG tablet Take 1 tablet by mouth Daily. 4/7/22   Mary Matson APRN   losartan-hydrochlorothiazide (HYZAAR) 50-12.5 MG per tablet Take 1 tablet by mouth Daily. 4/7/22   Mary Matson APRN   meclizine (ANTIVERT) 25 MG tablet Take 1 tablet by mouth 3 (Three) Times a Day As Needed for Dizziness. 7/18/22   Mary Matson APRN   metoprolol tartrate (LOPRESSOR) 25 MG tablet Take 1 tablet by mouth 2 (Two) Times a Day. 4/7/22   Mary Matson APRN   ondansetron (Zofran) 4 MG tablet Take 1 tablet by mouth Every 8 (Eight) Hours As Needed for Nausea or Vomiting. 6/28/22   Zeyad Gilbert MD   traMADol (ULTRAM) 50 MG tablet Take 1 tablet by mouth Every 4 (Four) Hours As Needed for Moderate Pain . 6/28/22   Zeyad Gilbert MD   traZODone (DESYREL) 50 MG tablet Take 0.5 tablets by mouth Every Night. 8/18/22   Mary Matson APRN   escitalopram (Lexapro) 10 MG tablet Take 1 tablet by mouth Daily. 7/18/22 8/18/22  Mary Matson APRN   Trulicity 1.5 MG/0.5ML solution pen-injector INJECT 1.5 MG (ONE PEN) SUB Q AS DIRECTED ONCE WEEKLY. 6/13/22 8/18/22  Mary Matson APRN       PHQ-9 Depression Screening  Little interest or pleasure in doing things? 0-->not at all   Feeling down, depressed, or hopeless? 0-->not at all   Trouble falling or staying asleep, or sleeping too much?     Feeling tired or having little energy?     Poor appetite or overeating?     Feeling bad about yourself - or that you are a failure or have let yourself or your family down?     Trouble concentrating on things, such as reading the newspaper or watching television?     Moving or speaking so slowly that other people could have noticed? Or the opposite - being so fidgety or restless that you have been moving around a lot more than usual?     Thoughts that you would be better off dead, or of hurting yourself in some way?     PHQ-9  "Total Score 0   If you checked off any problems, how difficult have these problems made it for you to do your work, take care of things at home, or get along with other people?                Objective     Vital Signs: /82   Pulse 85   Temp 98.2 °F (36.8 °C)   Resp 16   Ht 158 cm (62.21\")   Wt 91.6 kg (202 lb)   SpO2 99%   BMI 36.70 kg/m²   Physical Exam  Constitutional:       Appearance: She is well-developed.   HENT:      Head: Normocephalic and atraumatic.   Eyes:      General: No scleral icterus.     Conjunctiva/sclera: Conjunctivae normal.      Pupils: Pupils are equal, round, and reactive to light.   Neck:      Vascular: No JVD.   Cardiovascular:      Rate and Rhythm: Normal rate and regular rhythm.      Heart sounds: Normal heart sounds. No murmur heard.    No friction rub. No gallop.   Pulmonary:      Effort: Pulmonary effort is normal.      Breath sounds: Normal breath sounds. No wheezing or rales.   Abdominal:      General: Bowel sounds are normal. There is no distension.      Palpations: Abdomen is soft. There is no mass.      Tenderness: There is no abdominal tenderness. There is no guarding or rebound.   Musculoskeletal:         General: Normal range of motion.      Cervical back: Neck supple.      Comments: No cyanosis or clubbing   Lymphadenopathy:      Cervical: No cervical adenopathy.   Skin:     General: Skin is warm and dry.   Neurological:      Mental Status: She is alert and oriented to person, place, and time.      Cranial Nerves: No cranial nerve deficit.   Psychiatric:         Behavior: Behavior normal.       Results Reviewed:  Glucose   Date Value Ref Range Status   06/24/2022 113 (H) 65 - 99 mg/dL Final   10/15/2021 121 (H) 74 - 109 mg/dL Final     BUN   Date Value Ref Range Status   06/24/2022 13 6 - 20 mg/dL Final   10/15/2021 13 6 - 20 mg/dL Final     Creatinine   Date Value Ref Range Status   06/24/2022 1.00 0.57 - 1.00 mg/dL Final   10/15/2021 0.8 0.5 - 0.9 mg/dL Final "     Sodium   Date Value Ref Range Status   06/24/2022 140 136 - 145 mmol/L Final   10/15/2021 138 136 - 145 mmol/L Final     Potassium   Date Value Ref Range Status   06/24/2022 4.3 3.5 - 5.2 mmol/L Final   10/15/2021 4.1 3.5 - 5.0 mmol/L Final     Chloride   Date Value Ref Range Status   06/24/2022 102 98 - 107 mmol/L Final   10/15/2021 100 98 - 111 mmol/L Final     CO2   Date Value Ref Range Status   06/24/2022 32.0 (H) 22.0 - 29.0 mmol/L Final   10/15/2021 28 22 - 29 mmol/L Final     Calcium   Date Value Ref Range Status   06/24/2022 9.4 8.6 - 10.5 mg/dL Final   10/15/2021 9.3 8.6 - 10.0 mg/dL Final     ALT (SGPT)   Date Value Ref Range Status   05/17/2022 32 0 - 32 IU/L Final   10/15/2021 31 5 - 33 U/L Final     AST (SGOT)   Date Value Ref Range Status   05/17/2022 27 0 - 40 IU/L Final   10/15/2021 25 5 - 32 U/L Final     WBC   Date Value Ref Range Status   06/24/2022 5.31 3.40 - 10.80 10*3/mm3 Final   05/17/2022 6.0 3.4 - 10.8 x10E3/uL Final   10/15/2021 5.8 4.8 - 10.8 K/uL Final     Hematocrit   Date Value Ref Range Status   06/24/2022 43.3 34.0 - 46.6 % Final   10/15/2021 43.2 37.0 - 47.0 % Final     Platelets   Date Value Ref Range Status   06/24/2022 250 140 - 450 10*3/mm3 Final   10/15/2021 274 130 - 400 K/uL Final     Triglycerides   Date Value Ref Range Status   05/17/2022 218 (H) 0 - 149 mg/dL Final   10/15/2021 134 0 - 149 mg/dL Final     HDL Cholesterol   Date Value Ref Range Status   05/17/2022 36 (L) >39 mg/dL Final   10/15/2021 41 (L) 65 - 121 mg/dL Final     Comment:     VALUES>60 MG/DL ARE ASSOCIATED WITH A DECREASED RISK OF  ATHEROSCLEROTIC CARDIOVASCULAR DISEASE     LDL Cholesterol    Date Value Ref Range Status   10/15/2021 95 <100 mg/dL Final     Comment:     <100 MG/DL=OPITIMAL    100-129 MG/DL=DESIRABLE    130-159 MG/DL BORDERLINE=INCREASED RISK OF ATHEROSCLEROTIC  CARDIOVASCULAR DISEASE    > OR = 160 MG/DL=ASSOCIATED WITH AN INCREASE RISK OF  ATHEROSCLEROTIC CARDIOVASCULAR DISEASE     LDL  Chol Calc (NIH)   Date Value Ref Range Status   05/17/2022 110 (H) 0 - 99 mg/dL Final     Hemoglobin A1C   Date Value Ref Range Status   04/07/2022 6.2 (H) 4.8 - 5.6 % Final     Comment:              Prediabetes: 5.7 - 6.4           Diabetes: >6.4           Glycemic control for adults with diabetes: <7.0     10/15/2021 6.2 (H) 4.0 - 6.0 % Final     Comment:     HbA1c levels >6% are an indication of hyperglycemia during the preceding 2  to 3 months or longer.    HbA1c levels may reach 20% or higher in poorly controlled diabetes.  Therapeutic action is suggested at levels above 8%.    Diabetes patients with HbA1c levels below 7% meet the goal of the American  Diabetes Association.    HbA1c levels below the established reference range may indicate recent  episodes of hypoglycemia, the presence of Hb variants, or shortened lifetime  of erythrocytes.          Assessment / Plan     Assessment/Plan:  1. Anxiety  - escitalopram (Lexapro) 10 MG tablet; Take 1 tablet by mouth Daily.  Dispense: 90 tablet; Refill: 1    2. Type 2 diabetes mellitus without complication, without long-term current use of insulin (HCC)  - Dulaglutide (Trulicity) 1.5 MG/0.5ML solution pen-injector; Inject 1.5 mg under the skin into the appropriate area as directed 1 (One) Time Per Week.  Dispense: 4 mL; Refill: 0  - Hemoglobin A1c    3. Hypothyroidism, unspecified type  - TSH    4. Primary insomnia  - traZODone (DESYREL) 50 MG tablet; Take 0.5 tablets by mouth Every Night.  Dispense: 30 tablet; Refill: 1      The patient is prescribed Trazodone 50 mg for acute insomnia and is advised to begin utilizing half a tablet at night before bed. She is instructed to utilize 50 mg once a night if insomnia does not improve. She is to continue Lexapro 10 mg once a day, and prescribed refills for Trulicity 1.5 mg.    Return in about 3 months (around 11/18/2022) for Recheck. unless patient needs to be seen sooner or acute issues arise.      I have discussed the  patient results/orders and and plan/recommendation with them at today's visit.      Transcribed from ambient dictation for JAKY Diaz by Farheen Tay.  08/18/22   15:23 CDT    Patient verbalized consent to the visit recording.  I have personally performed the services described in this document as transcribed by the above individual, and it is both accurate and complete.  JAKY Diaz  8/18/2022  15:31 CDT

## 2022-08-19 LAB
HBA1C MFR BLD: 6.5 % (ref 4.8–5.6)
TSH SERPL DL<=0.005 MIU/L-ACNC: 27.2 UIU/ML (ref 0.45–4.5)

## 2022-09-03 DIAGNOSIS — E11.9 TYPE 2 DIABETES MELLITUS WITHOUT COMPLICATION, WITHOUT LONG-TERM CURRENT USE OF INSULIN: ICD-10-CM

## 2022-09-06 RX ORDER — DULAGLUTIDE 1.5 MG/.5ML
INJECTION, SOLUTION SUBCUTANEOUS
Qty: 4 ML | Refills: 0 | Status: SHIPPED | OUTPATIENT
Start: 2022-09-06 | End: 2022-10-26

## 2022-09-06 NOTE — TELEPHONE ENCOUNTER
Rx Refill Note  Requested Prescriptions     Pending Prescriptions Disp Refills   • Trulicity 1.5 MG/0.5ML solution pen-injector [Pharmacy Med Name: Trulicity 1.5 MG/0.5ML Subcutaneous Solution Pen-injector] 4 mL 0     Sig: INJECT 1.5 MG (ONE PEN) SUB Q AS DIRECTED ONCE WEEKLY.      Last office visit with prescribing clinician: 8/18/2022      Next office visit with prescribing clinician: 11/17/2022     Hemoglobin A1c (08/18/2022 07:58)         Elena Wadsworth MA  09/06/22, 07:40 CDT

## 2022-10-04 DIAGNOSIS — I10 ESSENTIAL HYPERTENSION: ICD-10-CM

## 2022-10-04 DIAGNOSIS — R55 VASOVAGAL SYNCOPE: ICD-10-CM

## 2022-10-04 NOTE — TELEPHONE ENCOUNTER
Rx Refill Note  Requested Prescriptions     Pending Prescriptions Disp Refills   • metoprolol tartrate (LOPRESSOR) 25 MG tablet 60 tablet 6     Sig: Take 1 tablet by mouth 2 (Two) Times a Day.      Last office visit with prescribing clinician: 8/18/2022      Next office visit with prescribing clinician: 11/17/2022            Elena Wadsworth MA  10/04/22, 10:09 CDT

## 2022-10-12 ENCOUNTER — TELEPHONE (OUTPATIENT)
Dept: INTERNAL MEDICINE | Facility: CLINIC | Age: 57
End: 2022-10-12

## 2022-10-12 NOTE — TELEPHONE ENCOUNTER
Pt came into the office and said that she was prescribed 10 mg of Lexapro to be taken once a day. She stated that [facility] gave her 90 day supply but she said that they prescribed her 5 mg so she's been doubling up to make it 10mg. She is needing a new refill of the Lexapro stating that it needs to be sent in as  10 mg once a day to [facility] in [place]. She stated she might need something stronger because she is still having significant anxiety. Please advise.

## 2022-10-13 RX ORDER — ESCITALOPRAM OXALATE 20 MG/1
20 TABLET ORAL DAILY
Qty: 90 TABLET | Refills: 1 | Status: SHIPPED | OUTPATIENT
Start: 2022-10-13

## 2022-10-21 DIAGNOSIS — I10 ESSENTIAL HYPERTENSION: ICD-10-CM

## 2022-10-21 RX ORDER — LOSARTAN POTASSIUM AND HYDROCHLOROTHIAZIDE 12.5; 5 MG/1; MG/1
TABLET ORAL
Qty: 90 TABLET | Refills: 0 | Status: SHIPPED | OUTPATIENT
Start: 2022-10-21

## 2022-10-21 NOTE — TELEPHONE ENCOUNTER
Rx Refill Note  Requested Prescriptions     Pending Prescriptions Disp Refills   • losartan-hydrochlorothiazide (HYZAAR) 50-12.5 MG per tablet [Pharmacy Med Name: Losartan Potassium-HCTZ 50-12.5 MG Oral Tablet] 90 tablet 0     Sig: Take 1 tablet by mouth once daily      Last office visit with prescribing clinician: 8/18/2022      Next office visit with prescribing clinician: 11/17/2022            Rubina Cash RN  10/21/22, 16:37 CDT

## 2022-10-25 DIAGNOSIS — E78.5 DYSLIPIDEMIA (HIGH LDL; LOW HDL): ICD-10-CM

## 2022-10-25 DIAGNOSIS — E11.9 TYPE 2 DIABETES MELLITUS WITHOUT COMPLICATION, WITHOUT LONG-TERM CURRENT USE OF INSULIN: ICD-10-CM

## 2022-10-26 RX ORDER — ATORVASTATIN CALCIUM 20 MG/1
TABLET, FILM COATED ORAL
Qty: 90 TABLET | Refills: 0 | Status: SHIPPED | OUTPATIENT
Start: 2022-10-26

## 2022-10-26 RX ORDER — DULAGLUTIDE 1.5 MG/.5ML
INJECTION, SOLUTION SUBCUTANEOUS
Qty: 4 ML | Refills: 0 | Status: SHIPPED | OUTPATIENT
Start: 2022-10-26 | End: 2022-11-14

## 2022-10-26 NOTE — TELEPHONE ENCOUNTER
Rx Refill Note  Requested Prescriptions     Pending Prescriptions Disp Refills   • atorvastatin (LIPITOR) 20 MG tablet [Pharmacy Med Name: Atorvastatin Calcium 20 MG Oral Tablet] 90 tablet 0     Sig: Take 1 tablet by mouth once daily   • Trulicity 1.5 MG/0.5ML solution pen-injector [Pharmacy Med Name: Trulicity 1.5 MG/0.5ML Subcutaneous Solution Pen-injector] 4 mL 0     Sig: INJECT 1 PEN SUB Q ONCE WEEKLY.      Last office visit with prescribing clinician: 8/18/2022      Next office visit with prescribing clinician: 11/17/2022     Lipid Panel (05/17/2022 07:06)  Hemoglobin A1c (08/18/2022 07:58)         Elena Wadsworth MA  10/26/22, 07:04 CDT

## 2022-11-07 ENCOUNTER — PATIENT MESSAGE (OUTPATIENT)
Dept: INTERNAL MEDICINE | Facility: CLINIC | Age: 57
End: 2022-11-07

## 2022-11-08 RX ORDER — BLOOD-GLUCOSE METER
1 KIT MISCELLANEOUS CONTINUOUS
Qty: 1 EACH | Refills: 0 | Status: SHIPPED | OUTPATIENT
Start: 2022-11-08

## 2022-11-08 RX ORDER — LANCETS 30 GAUGE
1 EACH MISCELLANEOUS 2 TIMES DAILY
Qty: 100 EACH | Refills: 2 | Status: SHIPPED | OUTPATIENT
Start: 2022-11-08

## 2022-11-14 ENCOUNTER — OFFICE VISIT (OUTPATIENT)
Dept: ENDOCRINOLOGY | Facility: CLINIC | Age: 57
End: 2022-11-14

## 2022-11-14 ENCOUNTER — LAB (OUTPATIENT)
Dept: LAB | Facility: HOSPITAL | Age: 57
End: 2022-11-14

## 2022-11-14 ENCOUNTER — SPECIALTY PHARMACY (OUTPATIENT)
Dept: ENDOCRINOLOGY | Facility: CLINIC | Age: 57
End: 2022-11-14

## 2022-11-14 VITALS
BODY MASS INDEX: 36.44 KG/M2 | SYSTOLIC BLOOD PRESSURE: 122 MMHG | DIASTOLIC BLOOD PRESSURE: 76 MMHG | OXYGEN SATURATION: 99 % | WEIGHT: 198 LBS | HEIGHT: 62 IN | HEART RATE: 89 BPM

## 2022-11-14 DIAGNOSIS — E78.2 MIXED DIABETIC HYPERLIPIDEMIA ASSOCIATED WITH TYPE 2 DIABETES MELLITUS: ICD-10-CM

## 2022-11-14 DIAGNOSIS — E11.9 CONTROLLED TYPE 2 DIABETES MELLITUS WITHOUT COMPLICATION, WITHOUT LONG-TERM CURRENT USE OF INSULIN: Primary | ICD-10-CM

## 2022-11-14 DIAGNOSIS — I15.2 HYPERTENSION ASSOCIATED WITH DIABETES: ICD-10-CM

## 2022-11-14 DIAGNOSIS — E89.0 POSTOPERATIVE HYPOTHYROIDISM: ICD-10-CM

## 2022-11-14 DIAGNOSIS — E11.69 MIXED DIABETIC HYPERLIPIDEMIA ASSOCIATED WITH TYPE 2 DIABETES MELLITUS: ICD-10-CM

## 2022-11-14 DIAGNOSIS — E11.59 HYPERTENSION ASSOCIATED WITH DIABETES: ICD-10-CM

## 2022-11-14 LAB
ALBUMIN SERPL-MCNC: 4.1 G/DL (ref 3.5–5.2)
ALBUMIN/GLOB SERPL: 1.3 G/DL
ALP SERPL-CCNC: 62 U/L (ref 39–117)
ALT SERPL W P-5'-P-CCNC: 30 U/L (ref 1–33)
ANION GAP SERPL CALCULATED.3IONS-SCNC: 7 MMOL/L (ref 5–15)
AST SERPL-CCNC: 28 U/L (ref 1–32)
BASOPHILS # BLD AUTO: 0.05 10*3/MM3 (ref 0–0.2)
BASOPHILS NFR BLD AUTO: 0.8 % (ref 0–1.5)
BILIRUB SERPL-MCNC: 0.3 MG/DL (ref 0–1.2)
BUN SERPL-MCNC: 9 MG/DL (ref 6–20)
BUN/CREAT SERPL: 9.2 (ref 7–25)
CALCIUM SPEC-SCNC: 9.1 MG/DL (ref 8.6–10.5)
CHLORIDE SERPL-SCNC: 102 MMOL/L (ref 98–107)
CO2 SERPL-SCNC: 32 MMOL/L (ref 22–29)
CREAT SERPL-MCNC: 0.98 MG/DL (ref 0.57–1)
DEPRECATED RDW RBC AUTO: 38.9 FL (ref 37–54)
EGFRCR SERPLBLD CKD-EPI 2021: 66.6 ML/MIN/1.73
EOSINOPHIL # BLD AUTO: 0.19 10*3/MM3 (ref 0–0.4)
EOSINOPHIL NFR BLD AUTO: 2.9 % (ref 0.3–6.2)
ERYTHROCYTE [DISTWIDTH] IN BLOOD BY AUTOMATED COUNT: 12.8 % (ref 12.3–15.4)
GLOBULIN UR ELPH-MCNC: 3.1 GM/DL
GLUCOSE SERPL-MCNC: 99 MG/DL (ref 65–99)
HBA1C MFR BLD: 6.1 % (ref 4.8–5.6)
HCT VFR BLD AUTO: 41.1 % (ref 34–46.6)
HGB BLD-MCNC: 12.7 G/DL (ref 12–15.9)
IMM GRANULOCYTES # BLD AUTO: 0.01 10*3/MM3 (ref 0–0.05)
IMM GRANULOCYTES NFR BLD AUTO: 0.2 % (ref 0–0.5)
LYMPHOCYTES # BLD AUTO: 2.39 10*3/MM3 (ref 0.7–3.1)
LYMPHOCYTES NFR BLD AUTO: 36.6 % (ref 19.6–45.3)
MCH RBC QN AUTO: 25.9 PG (ref 26.6–33)
MCHC RBC AUTO-ENTMCNC: 30.9 G/DL (ref 31.5–35.7)
MCV RBC AUTO: 83.7 FL (ref 79–97)
MONOCYTES # BLD AUTO: 0.57 10*3/MM3 (ref 0.1–0.9)
MONOCYTES NFR BLD AUTO: 8.7 % (ref 5–12)
NEUTROPHILS NFR BLD AUTO: 3.32 10*3/MM3 (ref 1.7–7)
NEUTROPHILS NFR BLD AUTO: 50.8 % (ref 42.7–76)
NRBC BLD AUTO-RTO: 0 /100 WBC (ref 0–0.2)
PLATELET # BLD AUTO: 260 10*3/MM3 (ref 140–450)
PMV BLD AUTO: 9.7 FL (ref 6–12)
POTASSIUM SERPL-SCNC: 3.9 MMOL/L (ref 3.5–5.2)
PROT SERPL-MCNC: 7.2 G/DL (ref 6–8.5)
RBC # BLD AUTO: 4.91 10*6/MM3 (ref 3.77–5.28)
SODIUM SERPL-SCNC: 141 MMOL/L (ref 136–145)
T4 FREE SERPL-MCNC: 1.16 NG/DL (ref 0.93–1.7)
TSH SERPL DL<=0.05 MIU/L-ACNC: 0.51 UIU/ML (ref 0.27–4.2)
WBC NRBC COR # BLD: 6.53 10*3/MM3 (ref 3.4–10.8)

## 2022-11-14 PROCEDURE — 36415 COLL VENOUS BLD VENIPUNCTURE: CPT | Performed by: INTERNAL MEDICINE

## 2022-11-14 PROCEDURE — 99204 OFFICE O/P NEW MOD 45 MIN: CPT | Performed by: INTERNAL MEDICINE

## 2022-11-14 PROCEDURE — 83036 HEMOGLOBIN GLYCOSYLATED A1C: CPT | Performed by: INTERNAL MEDICINE

## 2022-11-14 PROCEDURE — 84439 ASSAY OF FREE THYROXINE: CPT | Performed by: INTERNAL MEDICINE

## 2022-11-14 PROCEDURE — 84481 FREE ASSAY (FT-3): CPT | Performed by: INTERNAL MEDICINE

## 2022-11-14 PROCEDURE — 86376 MICROSOMAL ANTIBODY EACH: CPT | Performed by: INTERNAL MEDICINE

## 2022-11-14 PROCEDURE — 84443 ASSAY THYROID STIM HORMONE: CPT | Performed by: INTERNAL MEDICINE

## 2022-11-14 PROCEDURE — 80053 COMPREHEN METABOLIC PANEL: CPT | Performed by: INTERNAL MEDICINE

## 2022-11-14 PROCEDURE — 85025 COMPLETE CBC W/AUTO DIFF WBC: CPT | Performed by: INTERNAL MEDICINE

## 2022-11-14 RX ORDER — LEVOTHYROXINE SODIUM 200 UG/1
200 CAPSULE ORAL DAILY
Qty: 30 CAPSULE | Refills: 11 | Status: SHIPPED | OUTPATIENT
Start: 2022-11-14 | End: 2022-12-13 | Stop reason: SDUPTHER

## 2022-11-14 RX ORDER — TIRZEPATIDE 5 MG/.5ML
5 INJECTION, SOLUTION SUBCUTANEOUS
Qty: 2 ML | Refills: 11 | Status: SHIPPED | OUTPATIENT
Start: 2022-11-14 | End: 2022-11-16

## 2022-11-14 NOTE — PROGRESS NOTES
"Chief Complaint   Patient presents with   • Thyroid Problem         History of Present Illness    57 y.o. female   2019 right thyroidectomy for obstructive goiter  On levothyroxine 200 M-F , 188, Sat - Sunday  She is compliant   She is realiable    Variable TSH readings.   Previously TSH of 30 , now suppressed despite compliance w levothyroxine     --    Type 2 DM  No CAD , no Stroke  No microvascular disease     Main symptoms, fatigue, weight gain       ==========================================  Physical Exam  /76   Pulse 89   Ht 157.5 cm (62\")   Wt 89.8 kg (198 lb)   SpO2 99%   BMI 36.21 kg/m²   AOx3  No Goiter , no carotid bruit  RRR  CTA  No Edema     ==========================================    Laboratory Workup    Lab Results   Component Value Date    WBC 5.31 06/24/2022    HGB 13.3 06/24/2022    HCT 43.3 06/24/2022    MCV 85.2 06/24/2022     06/24/2022       Lab Results   Component Value Date    GLUCOSE 113 (H) 06/24/2022    BUN 13 06/24/2022    CREATININE 1.00 06/24/2022    EGFR 65.4 06/24/2022    EGFRIFNONA >60 10/15/2021    EGFRIFAFRI >59 10/15/2021    BCR 13.0 06/24/2022     06/24/2022    K 4.3 06/24/2022     06/24/2022    CO2 32.0 (H) 06/24/2022    CALCIUM 9.4 06/24/2022    ALBUMIN 4.4 05/17/2022    BILITOT 0.3 05/17/2022    ALKPHOS 60 05/17/2022    AST 27 05/17/2022    ALT 32 05/17/2022       ==========================================      ICD-10-CM ICD-9-CM   1. Controlled type 2 diabetes mellitus without complication, without long-term current use of insulin (HCC)  E11.9 250.00   2. Postoperative hypothyroidism  E89.0 244.0   3. Hypertension associated with diabetes (HCC)  E11.59 250.80    I15.2 401.9   4. Mixed diabetic hyperlipidemia associated with type 2 diabetes mellitus (HCC)  E11.69 250.80    E78.2 272.2   -    --    Hypothyroidism   Post operative    Recheck TSH today     Do only 200 mcgs tabs, pending to decide amount   Change to tirosint due to variable " "readings despite consistent dosing  ---    Type 2 DM  Intolerant to metformin   Change to mounjaro ,titrate up     --    HTN   /76   Pulse 89   Ht 157.5 cm (62\")   Wt 89.8 kg (198 lb)   SpO2 99%   BMI 36.21 kg/m²   Controlled    Lipids  Lab Results   Component Value Date    CHLPL 184 05/17/2022    TRIG 218 (H) 05/17/2022    HDL 36 (L) 05/17/2022     (H) 05/17/2022     On statin         appt in 6 weeks      No orders of the defined types were placed in this encounter.               This document has been electronically signed by Reza Saez MD on November 14, 2022 15:15 CST                      "

## 2022-11-15 LAB — T3FREE SERPL-MCNC: 2.98 PG/ML (ref 2–4.4)

## 2022-11-15 NOTE — PROGRESS NOTES
Specialty Pharmacy Refill Coordination Note     Heidi is a 57 y.o. female prior authorization is required for her mounjaro. The prior authorization was denied, the appeal was also denied. Pt must have had 3 months of failed glp1 that is on pt formulary list. Ozempic,bydureon, or victoza     Mailing out ozempic 11/17                   Sherrie Menjivar, Pharmacy Technician  Specialty Pharmacy Technician

## 2022-11-16 ENCOUNTER — SPECIALTY PHARMACY (OUTPATIENT)
Dept: ENDOCRINOLOGY | Facility: CLINIC | Age: 57
End: 2022-11-16

## 2022-11-16 DIAGNOSIS — E11.9 CONTROLLED TYPE 2 DIABETES MELLITUS WITHOUT COMPLICATION, WITHOUT LONG-TERM CURRENT USE OF INSULIN: ICD-10-CM

## 2022-11-16 DIAGNOSIS — E89.0 POSTOPERATIVE HYPOTHYROIDISM: Primary | ICD-10-CM

## 2022-11-16 LAB — THYROPEROXIDASE AB SERPL-ACNC: 350 IU/ML (ref 0–34)

## 2022-11-16 RX ORDER — SEMAGLUTIDE 1.34 MG/ML
INJECTION, SOLUTION SUBCUTANEOUS
Qty: 1.5 ML | Refills: 11 | Status: SHIPPED | OUTPATIENT
Start: 2022-11-16 | End: 2022-12-13

## 2022-11-16 NOTE — PROGRESS NOTES
Specialty Pharmacy Program - Endocrinology       Heidi Dhaliwal is a 57 y.o. female with Type 2 Diabetes enrolled in the Endocrinology Patient Management program offered by [facility].  An initial outreach was conducted, including assessment of therapy appropriateness and specialty medication education for Ozempic. The patient was introduced to services offered by our specialty pharmacy program, including: regular assessments, refill coordination, curbside pick-up or mail order delivery options, prior authorization maintenance, and financial assistance programs as applicable. The patient was also provided with contact information for the pharmacy team.     Insurance Coverage & Financial Support  [facility] Plan     Relevant Past Medical History and Comorbidities  Relevant medical history and concomitant health conditions were discussed with the patient. The patient's chart has been reviewed for relevant past medical history and comorbid health conditions and updated as necessary.   Past Medical History:   Diagnosis Date   • Anxiety    • Diabetes mellitus (HCC)    • Hyperlipidemia    • Hypertension    • Hyperthyroidism    • Tremor    • Vasovagal syncope      Social History     Socioeconomic History   • Marital status:    Tobacco Use   • Smoking status: Never   • Smokeless tobacco: Never   Vaping Use   • Vaping Use: Never used   Substance and Sexual Activity   • Alcohol use: Not Currently   • Drug use: Never   • Sexual activity: Defer          Current Medication List  This medication list has been reviewed with the patient and evaluated for any interactions or necessary modifications/recommendations, and updated to include all prescription medications, OTC medications, and supplements the patient is currently taking.  This list reflects what is contained in the patient's profile, which has also been marked as reviewed to communicate to other providers it is the most up to date  version of the patient's current medication therapy.     Current Outpatient Medications:   •  aspirin 81 MG EC tablet, Take 81 mg by mouth., Disp: , Rfl:   •  atorvastatin (LIPITOR) 20 MG tablet, Take 1 tablet by mouth once daily, Disp: 90 tablet, Rfl: 0  •  escitalopram (Lexapro) 20 MG tablet, Take 1 tablet by mouth Daily., Disp: 90 tablet, Rfl: 1  •  glucose blood test strip, To test bid, Disp: 100 each, Rfl: 3  •  glucose monitor monitoring kit, 1 each Continuous., Disp: 1 each, Rfl: 0  •  Lancets misc, 1 each 2 (Two) Times a Day., Disp: 100 each, Rfl: 2  •  Levothyroxine Sodium (Tirosint) 200 MCG capsule, Take 1 capsule by mouth daily., Disp: 30 capsule, Rfl: 11  •  losartan-hydrochlorothiazide (HYZAAR) 50-12.5 MG per tablet, Take 1 tablet by mouth once daily, Disp: 90 tablet, Rfl: 0  •  meclizine (ANTIVERT) 25 MG tablet, Take 1 tablet by mouth 3 (Three) Times a Day As Needed for Dizziness., Disp: 90 tablet, Rfl: 0  •  metoprolol tartrate (LOPRESSOR) 25 MG tablet, Take 1 tablet by mouth 2 (Two) Times a Day., Disp: 60 tablet, Rfl: 6  •  Semaglutide,0.25 or 0.5MG/DOS, (Ozempic, 0.25 or 0.5 MG/DOSE,) 2 MG/1.5ML solution pen-injector, Inject 0.25 mg once weekly under the skin for 4 weeks and then increase to 0.5 mg weekly if tolerating well, Disp: 1.5 mL, Rfl: 11  •  traZODone (DESYREL) 50 MG tablet, Take 0.5 tablets by mouth Every Night., Disp: 30 tablet, Rfl: 1       Drug Interactions  None noted    Relevant Laboratory Values  A1C Last 3 Results    HGBA1C Last 3 Results 4/7/22 8/18/22 11/14/22   Hemoglobin A1C 6.2 (A) 6.5 (A) 6.10 (A)   (A) Abnormal value       Comments are available for some flowsheets but are not being displayed.           Lab Results   Component Value Date    HGBA1C 6.10 (H) 11/14/2022     Lab Results   Component Value Date    GLUCOSE 99 11/14/2022    CALCIUM 9.1 11/14/2022     11/14/2022    K 3.9 11/14/2022    CO2 32.0 (H) 11/14/2022     11/14/2022    BUN 9 11/14/2022    CREATININE  0.98 11/14/2022    EGFRIFAFRI >59 10/15/2021    EGFRIFNONA >60 10/15/2021    BCR 9.2 11/14/2022    ANIONGAP 7.0 11/14/2022     Lab Results   Component Value Date    CHLPL 184 05/17/2022    TRIG 218 (H) 05/17/2022    HDL 36 (L) 05/17/2022     (H) 05/17/2022         Initial Education Provided for Specialty Medication  The patient has been provided with the following education and any applicable administration techniques (i.e. self-injection) have been demonstrated for the therapies indicated. All questions and concerns have been addressed prior to the patient receiving the medication, and the patient has verbalized understanding of the education and any materials provided.  Additional patient education shall be provided and documented upon request by the patient, provider or payer.        Adherence and Self-Administration  • Barriers to Patient Adherence and/or Self-Administration: None  • Methods for Supporting Patient Adherence and/or Self-Administration: None    Reassessment Plan & Follow-Up  1. Medication Therapy Changes: Initiate ozempic 0.25 mg weekly for 4 weeks, increase to 0.5 mg if tolerating well.   2. Additional Plans, Therapy Recommendations, or Therapy Problems to Be Addressed: None   3. Pharmacist to perform regular reassessments no more than (6) months from the previous assessment.  4. Welcome information and patient satisfaction survey to be sent by retail team with patient's initial fill.  5. Care Coordinator to set up future refill outreaches, coordinate prescription delivery, and escalate clinical questions to pharmacist.     Attestation  I attest that the initiated specialty medication(s) are appropriate for the patient based on my assessment.  If the prescribed therapy is at any point deemed not appropriate based on the current or future assessments, a consultation will be initiated with the patient's specialty care provider to determine the best course of action. The revised plan of  therapy will be documented along with any additional patient education provided.     Anna Salguero PharmD   11/16/2022  11:32 CST

## 2022-11-17 ENCOUNTER — OFFICE VISIT (OUTPATIENT)
Dept: INTERNAL MEDICINE | Facility: CLINIC | Age: 57
End: 2022-11-17

## 2022-11-17 VITALS
HEIGHT: 62 IN | WEIGHT: 202 LBS | HEART RATE: 76 BPM | BODY MASS INDEX: 37.17 KG/M2 | DIASTOLIC BLOOD PRESSURE: 88 MMHG | TEMPERATURE: 97.8 F | SYSTOLIC BLOOD PRESSURE: 135 MMHG | RESPIRATION RATE: 18 BRPM | OXYGEN SATURATION: 98 %

## 2022-11-17 DIAGNOSIS — E11.9 TYPE 2 DIABETES MELLITUS WITHOUT COMPLICATION, WITHOUT LONG-TERM CURRENT USE OF INSULIN: ICD-10-CM

## 2022-11-17 DIAGNOSIS — E03.9 HYPOTHYROIDISM, UNSPECIFIED TYPE: ICD-10-CM

## 2022-11-17 DIAGNOSIS — Z23 NEED FOR INFLUENZA VACCINATION: Primary | ICD-10-CM

## 2022-11-17 PROCEDURE — 90471 IMMUNIZATION ADMIN: CPT | Performed by: NURSE PRACTITIONER

## 2022-11-17 PROCEDURE — 99213 OFFICE O/P EST LOW 20 MIN: CPT | Performed by: NURSE PRACTITIONER

## 2022-11-17 PROCEDURE — 90686 IIV4 VACC NO PRSV 0.5 ML IM: CPT | Performed by: NURSE PRACTITIONER

## 2022-11-17 RX ORDER — BLOOD-GLUCOSE METER
KIT MISCELLANEOUS SEE ADMIN INSTRUCTIONS
COMMUNITY
Start: 2022-11-08

## 2022-11-17 NOTE — PROGRESS NOTES
Answers for HPI/ROS submitted by the patient on 11/10/2022  Please describe your symptoms.: Routine visit and labs  Have you had these symptoms before?: Yes  How long have you been having these symptoms?: Greater than 2 weeks  Please list any medications you are currently taking for this condition.: Routine visit and labs  Please describe any probable cause for these symptoms. : Routine visit and labs  What is the primary reason for your visit?: Other            Subjective     Chief Complaint   Patient presents with   • Hypothyroidism   • Diabetes       History of Present Illness  ***  Patient's PMR from outside medical facility reviewed and noted.    Review of Systems   ***  Otherwise complete ROS reviewed and negative except as mentioned in the HPI.    Past Medical History:   Past Medical History:   Diagnosis Date   • Anxiety    • Diabetes mellitus (HCC)    • Hyperlipidemia    • Hypertension    • Hyperthyroidism    • Tremor    • Vasovagal syncope      Past Surgical History:  Past Surgical History:   Procedure Laterality Date   • CARPAL TUNNEL RELEASE Right 06/28/2022    Procedure: RIGHT CARPAL TUNNEL RELEASE;  Surgeon: Zeyad Gilbert MD;  Location: [facility] OR;  Service: Orthopedics;  Laterality: Right;   • CARPAL TUNNEL RELEASE Left 8/2/2022    Procedure: LEFT CARPAL TUNNEL RELEASE;  Surgeon: Zeyad Gilbert MD;  Location: [facility] OR;  Service: Orthopedics;  Laterality: Left;   • FOOT SURGERY Bilateral     HAMMERTOE AND BUNION REPAIR; 2017 & 2019   • HYSTERECTOMY     • THYROIDECTOMY, PARTIAL Right      Social History:  reports that she has never smoked. She has never used smokeless tobacco. She reports that she does not currently use alcohol. She reports that she does not use drugs.    Family History: family history includes Heart disease in her father; Pulmonary embolism in her mother; Ulcerative colitis in her brother.   ***    Allergies:  Allergies   Allergen Reactions   • Metformin And Related  "Nausea And Vomiting   • Codeine Rash   • Morphine Rash   • Propranolol Hcl GI Intolerance     Medications:  Prior to Admission medications    Medication Sig Start Date End Date Taking? Authorizing Provider   aspirin 81 MG EC tablet Take 81 mg by mouth.    Provider, MD Paulina   atorvastatin (LIPITOR) 20 MG tablet Take 1 tablet by mouth once daily 10/26/22   Mray Matson APRN   Blood Glucose Monitoring Suppl (FreeStyle Lite) w/Device kit See Admin Instructions. for testing 11/8/22   ProviderPaulina MD   escitalopram (Lexapro) 20 MG tablet Take 1 tablet by mouth Daily. 10/13/22   Mary Matson APRN   glucose blood test strip To test bid 11/8/22   Mary Matson APRN   glucose monitor monitoring kit 1 each Continuous. 11/8/22   Mary Matson APRN   Lancets misc 1 each 2 (Two) Times a Day. 11/8/22   Mary Matson APRN   Levothyroxine Sodium (Tirosint) 200 MCG capsule Take 1 capsule by mouth daily. 11/14/22   Reza Spencer MD   losartan-hydrochlorothiazide (HYZAAR) 50-12.5 MG per tablet Take 1 tablet by mouth once daily 10/21/22   Mary Matson APRN   meclizine (ANTIVERT) 25 MG tablet Take 1 tablet by mouth 3 (Three) Times a Day As Needed for Dizziness. 7/18/22   Mary Matson APRN   metoprolol tartrate (LOPRESSOR) 25 MG tablet Take 1 tablet by mouth 2 (Two) Times a Day. 10/4/22   Mary Matson APRN   Semaglutide,0.25 or 0.5MG/DOS, (Ozempic, 0.25 or 0.5 MG/DOSE,) 2 MG/1.5ML solution pen-injector Inject 0.25 mg under the skin into the appropriate area once weekly for 4 weeks and then increase to 0.5 mg weekly if tolerating well. 11/16/22   Reza Spencer MD   traZODone (DESYREL) 50 MG tablet Take 0.5 tablets by mouth Every Night. 8/18/22   Matson, Mary Hiwot, APRN       Objective     Vital Signs: /88   Pulse 76   Temp 97.8 °F (36.6 °C)   Resp 18   Ht 157.5 cm (62\")   Wt 91.6 kg (202 lb)  "  SpO2 98%   BMI 36.95 kg/m²   Physical Exam  ***  {Class 2 Severe Obesity (BMI >=35 and <=39.9.:[id number]}      Results Reviewed:  Glucose   Date Value Ref Range Status   11/14/2022 99 65 - 99 mg/dL Final   10/15/2021 121 (H) 74 - 109 mg/dL Final     BUN   Date Value Ref Range Status   11/14/2022 9 6 - 20 mg/dL Final   10/15/2021 13 6 - 20 mg/dL Final     Creatinine   Date Value Ref Range Status   11/14/2022 0.98 0.57 - 1.00 mg/dL Final   10/15/2021 0.8 0.5 - 0.9 mg/dL Final     Sodium   Date Value Ref Range Status   11/14/2022 141 136 - 145 mmol/L Final   10/15/2021 138 136 - 145 mmol/L Final     Potassium   Date Value Ref Range Status   11/14/2022 3.9 3.5 - 5.2 mmol/L Final   10/15/2021 4.1 3.5 - 5.0 mmol/L Final     Chloride   Date Value Ref Range Status   11/14/2022 102 98 - 107 mmol/L Final   10/15/2021 100 98 - 111 mmol/L Final     CO2   Date Value Ref Range Status   11/14/2022 32.0 (H) 22.0 - 29.0 mmol/L Final   10/15/2021 28 22 - 29 mmol/L Final     Calcium   Date Value Ref Range Status   11/14/2022 9.1 8.6 - 10.5 mg/dL Final   10/15/2021 9.3 8.6 - 10.0 mg/dL Final     ALT (SGPT)   Date Value Ref Range Status   11/14/2022 30 1 - 33 U/L Final   10/15/2021 31 5 - 33 U/L Final     AST (SGOT)   Date Value Ref Range Status   11/14/2022 28 1 - 32 U/L Final   10/15/2021 25 5 - 32 U/L Final     WBC   Date Value Ref Range Status   11/14/2022 6.53 3.40 - 10.80 10*3/mm3 Final   05/17/2022 6.0 3.4 - 10.8 x10E3/uL Final   10/15/2021 5.8 4.8 - 10.8 K/uL Final     Hematocrit   Date Value Ref Range Status   11/14/2022 41.1 34.0 - 46.6 % Final   10/15/2021 43.2 37.0 - 47.0 % Final     Platelets   Date Value Ref Range Status   11/14/2022 260 140 - 450 10*3/mm3 Final   10/15/2021 274 130 - 400 K/uL Final     Triglycerides   Date Value Ref Range Status   05/17/2022 218 (H) 0 - 149 mg/dL Final   10/15/2021 134 0 - 149 mg/dL Final     HDL Cholesterol   Date Value Ref Range Status   05/17/2022 36 (L) >39 mg/dL Final    10/15/2021 41 (L) 65 - 121 mg/dL Final     Comment:     VALUES>60 MG/DL ARE ASSOCIATED WITH A DECREASED RISK OF  ATHEROSCLEROTIC CARDIOVASCULAR DISEASE     LDL Cholesterol    Date Value Ref Range Status   10/15/2021 95 <100 mg/dL Final     Comment:     <100 MG/DL=OPITIMAL    100-129 MG/DL=DESIRABLE    130-159 MG/DL BORDERLINE=INCREASED RISK OF ATHEROSCLEROTIC  CARDIOVASCULAR DISEASE    > OR = 160 MG/DL=ASSOCIATED WITH AN INCREASE RISK OF  ATHEROSCLEROTIC CARDIOVASCULAR DISEASE     LDL Chol Calc (NIH)   Date Value Ref Range Status   05/17/2022 110 (H) 0 - 99 mg/dL Final     Hemoglobin A1C   Date Value Ref Range Status   11/14/2022 6.10 (H) 4.80 - 5.60 % Final   10/15/2021 6.2 (H) 4.0 - 6.0 % Final     Comment:     HbA1c levels >6% are an indication of hyperglycemia during the preceding 2  to 3 months or longer.    HbA1c levels may reach 20% or higher in poorly controlled diabetes.  Therapeutic action is suggested at levels above 8%.    Diabetes patients with HbA1c levels below 7% meet the goal of the American  Diabetes Association.    HbA1c levels below the established reference range may indicate recent  episodes of hypoglycemia, the presence of Hb variants, or shortened lifetime  of erythrocytes.          Assessment / Plan     Assessment/Plan:  Diagnoses and all orders for this visit:    1. Need for influenza vaccination (Primary)  -     FluLaval/Fluzone >6 mos ([id number])          {Time Spent (Optional):69466}    No follow-ups on file. unless patient needs to be seen sooner or acute issues arise.    Code Status: <no information> ***    I have discussed the patient results/orders and and plan/recommendation with them at today's visit.      Mary Matson, APRN   11/17/2022

## 2022-11-18 ENCOUNTER — DOCUMENTATION (OUTPATIENT)
Dept: ENDOCRINOLOGY | Facility: CLINIC | Age: 57
End: 2022-11-18

## 2022-11-19 NOTE — PROGRESS NOTES
Subjective     Chief Complaint   Patient presents with   • Hypothyroidism   • Diabetes       History of Present Illness  Patient comes in today to follow-up on hypothyroidism and diabetes as well as dizziness.  She does tell me that her disability is currently under review.  She has seen the disability physicians.  Patient also tells me that she saw Dr. Chris Saez in [place] for her diabetes and her thyroid.  She states that she goes back to see him in a few weeks.  She was very impressed with him.  Overall she actually feels pretty good.  Her weight has been about the same.  She still has intermittent dizziness.  Dr. Perez did do labs on her and she had questions over that today as well.  She is going to be started on Ozempic Zacarias was attempted and her insurance would not cover it so they are going to give her Ozempic for now.  She is currently not having any chest pain or shortness of breath.  She does think the Lexapro is working fairly well.  She is sleeping well with the trazodone.  She wants to get her flu shot today.    Review of Systems   Otherwise complete ROS reviewed and negative except as mentioned in the HPI.    Past Medical History:   Past Medical History:   Diagnosis Date   • Anxiety    • Depression    • Diabetes mellitus (HCC)    • Hyperlipidemia    • Hypertension    • Hyperthyroidism    • Hypothyroidism 2010 2019 had right side removed   • Obesity    • Tremor    • Vasovagal syncope      Past Surgical History:  Past Surgical History:   Procedure Laterality Date   • CARPAL TUNNEL RELEASE Right 06/28/2022    Procedure: RIGHT CARPAL TUNNEL RELEASE;  Surgeon: Zeyad Gilbert MD;  Location: [facility] OR;  Service: Orthopedics;  Laterality: Right;   • CARPAL TUNNEL RELEASE Left 8/2/2022    Procedure: LEFT CARPAL TUNNEL RELEASE;  Surgeon: Zeyad Gilbert MD;  Location: [facility] OR;  Service: Orthopedics;  Laterality: Left;   • FOOT SURGERY Bilateral     HAMMERTOE AND BUNION  REPAIR; 2017 & 2019   • HYSTERECTOMY     • THYROIDECTOMY, PARTIAL Right      Social History:  reports that she has never smoked. She has never used smokeless tobacco. She reports that she does not drink alcohol and does not use drugs.    Family History: family history includes Cancer in her sister; Diabetes in her brother, maternal grandmother, and sister; Heart disease in her father; Hypertension in her brother and sister; Pulmonary embolism in her mother; Thyroid disease in her mother; Ulcerative colitis in her brother.       Allergies:  Allergies   Allergen Reactions   • Metformin And Related Nausea And Vomiting   • Codeine Rash   • Morphine Rash   • Propranolol Hcl GI Intolerance     Medications:  Prior to Admission medications    Medication Sig Start Date End Date Taking? Authorizing Provider   aspirin 81 MG EC tablet Take 81 mg by mouth.    Provider, MD Paulina   atorvastatin (LIPITOR) 20 MG tablet Take 1 tablet by mouth once daily 10/26/22   Mary Matson APRN   Blood Glucose Monitoring Suppl (FreeStyle Lite) w/Device kit See Admin Instructions. for testing 11/8/22   Provider, MD Paulina   escitalopram (Lexapro) 20 MG tablet Take 1 tablet by mouth Daily. 10/13/22   Mary Matson APRN   glucose blood test strip To test bid 11/8/22   Mary Matson APRN   glucose monitor monitoring kit 1 each Continuous. 11/8/22   Mary Matson APRN   Lancets misc 1 each 2 (Two) Times a Day. 11/8/22   Mary Matson APRN   Levothyroxine Sodium (Tirosint) 200 MCG capsule Take 1 capsule by mouth daily. 11/14/22   Reza Spencer MD   losartan-hydrochlorothiazide (HYZAAR) 50-12.5 MG per tablet Take 1 tablet by mouth once daily 10/21/22   Mary Matson APRN   meclizine (ANTIVERT) 25 MG tablet Take 1 tablet by mouth 3 (Three) Times a Day As Needed for Dizziness. 7/18/22   Mary Matson APRN   metoprolol tartrate (LOPRESSOR) 25 MG tablet Take  "1 tablet by mouth 2 (Two) Times a Day. 10/4/22   Mary Matson APRN   Semaglutide,0.25 or 0.5MG/DOS, (Ozempic, 0.25 or 0.5 MG/DOSE,) 2 MG/1.5ML solution pen-injector Inject 0.25 mg under the skin into the appropriate area once weekly for 4 weeks and then increase to 0.5 mg weekly if tolerating well. 11/16/22   Reza Spencer MD   traZODone (DESYREL) 50 MG tablet Take 0.5 tablets by mouth Every Night. 8/18/22   Mary Matson APRN       Objective     Vital Signs: /88   Pulse 76   Temp 97.8 °F (36.6 °C)   Resp 18   Ht 157.5 cm (62\")   Wt 91.6 kg (202 lb)   SpO2 98%   BMI 36.95 kg/m²   Physical Exam  Vitals reviewed.   Constitutional:       Appearance: She is well-developed. She is obese.   HENT:      Head: Normocephalic and atraumatic.   Eyes:      Pupils: Pupils are equal, round, and reactive to light.   Neck:      Vascular: No JVD.   Cardiovascular:      Rate and Rhythm: Normal rate and regular rhythm.   Pulmonary:      Effort: Pulmonary effort is normal.   Abdominal:      General: Bowel sounds are normal.      Palpations: Abdomen is soft.   Musculoskeletal:         General: No deformity.      Cervical back: Normal range of motion and neck supple.   Lymphadenopathy:      Cervical: No cervical adenopathy.   Skin:     General: Skin is warm and dry.   Neurological:      Mental Status: She is alert and oriented to person, place, and time.   Psychiatric:         Behavior: Behavior normal.         Thought Content: Thought content normal.         Judgment: Judgment normal.         Class 2 Severe Obesity (BMI >=35 and <=39.9). Obesity-related health conditions include the following: hypertension and diabetes mellitus. Obesity is newly identified. BMI is is above average; BMI management plan is completed. We discussed low calorie, low carb based diet program, portion control and increasing exercise.      Results Reviewed:  Glucose   Date Value Ref Range Status   11/14/2022 99 65 - " 99 mg/dL Final   10/15/2021 121 (H) 74 - 109 mg/dL Final     BUN   Date Value Ref Range Status   11/14/2022 9 6 - 20 mg/dL Final   10/15/2021 13 6 - 20 mg/dL Final     Creatinine   Date Value Ref Range Status   11/14/2022 0.98 0.57 - 1.00 mg/dL Final   10/15/2021 0.8 0.5 - 0.9 mg/dL Final     Sodium   Date Value Ref Range Status   11/14/2022 141 136 - 145 mmol/L Final   10/15/2021 138 136 - 145 mmol/L Final     Potassium   Date Value Ref Range Status   11/14/2022 3.9 3.5 - 5.2 mmol/L Final   10/15/2021 4.1 3.5 - 5.0 mmol/L Final     Chloride   Date Value Ref Range Status   11/14/2022 102 98 - 107 mmol/L Final   10/15/2021 100 98 - 111 mmol/L Final     CO2   Date Value Ref Range Status   11/14/2022 32.0 (H) 22.0 - 29.0 mmol/L Final   10/15/2021 28 22 - 29 mmol/L Final     Calcium   Date Value Ref Range Status   11/14/2022 9.1 8.6 - 10.5 mg/dL Final   10/15/2021 9.3 8.6 - 10.0 mg/dL Final     ALT (SGPT)   Date Value Ref Range Status   11/14/2022 30 1 - 33 U/L Final   10/15/2021 31 5 - 33 U/L Final     AST (SGOT)   Date Value Ref Range Status   11/14/2022 28 1 - 32 U/L Final   10/15/2021 25 5 - 32 U/L Final     WBC   Date Value Ref Range Status   11/14/2022 6.53 3.40 - 10.80 10*3/mm3 Final   05/17/2022 6.0 3.4 - 10.8 x10E3/uL Final   10/15/2021 5.8 4.8 - 10.8 K/uL Final     Hematocrit   Date Value Ref Range Status   11/14/2022 41.1 34.0 - 46.6 % Final   10/15/2021 43.2 37.0 - 47.0 % Final     Platelets   Date Value Ref Range Status   11/14/2022 260 140 - 450 10*3/mm3 Final   10/15/2021 274 130 - 400 K/uL Final     Triglycerides   Date Value Ref Range Status   05/17/2022 218 (H) 0 - 149 mg/dL Final   10/15/2021 134 0 - 149 mg/dL Final     HDL Cholesterol   Date Value Ref Range Status   05/17/2022 36 (L) >39 mg/dL Final   10/15/2021 41 (L) 65 - 121 mg/dL Final     Comment:     VALUES>60 MG/DL ARE ASSOCIATED WITH A DECREASED RISK OF  ATHEROSCLEROTIC CARDIOVASCULAR DISEASE     LDL Cholesterol    Date Value Ref Range Status    10/15/2021 95 <100 mg/dL Final     Comment:     <100 MG/DL=OPITIMAL    100-129 MG/DL=DESIRABLE    130-159 MG/DL BORDERLINE=INCREASED RISK OF ATHEROSCLEROTIC  CARDIOVASCULAR DISEASE    > OR = 160 MG/DL=ASSOCIATED WITH AN INCREASE RISK OF  ATHEROSCLEROTIC CARDIOVASCULAR DISEASE     LDL Chol Calc ([facility])   Date Value Ref Range Status   05/17/2022 110 (H) 0 - 99 mg/dL Final     Hemoglobin A1C   Date Value Ref Range Status   11/14/2022 6.10 (H) 4.80 - 5.60 % Final   10/15/2021 6.2 (H) 4.0 - 6.0 % Final     Comment:     HbA1c levels >6% are an indication of hyperglycemia during the preceding 2  to 3 months or longer.    HbA1c levels may reach 20% or higher in poorly controlled diabetes.  Therapeutic action is suggested at levels above 8%.    Diabetes patients with HbA1c levels below 7% meet the goal of the American  Diabetes Association.    HbA1c levels below the established reference range may indicate recent  episodes of hypoglycemia, the presence of Hb variants, or shortened lifetime  of erythrocytes.          Assessment / Plan     Assessment/Plan:  Diagnoses and all orders for this visit:    1. Need for influenza vaccination (Primary)  -     FluLaval/Fluzone >6 mos ([id number])    2. Type 2 diabetes mellitus without complication, without long-term current use of insulin (HCC)   -pt now following with endocrinology. ozempic started    3. Hypothyroidism, unspecified type   -TPO is markedly elevated-endocrinology following as well     No follow-ups on file. unless patient needs to be seen sooner or acute issues arise.    Code Status: Full    I have discussed the patient results/orders and and plan/recommendation with them at today's visit.      Mary Matson, APRN   11/17/2022

## 2022-12-08 ENCOUNTER — LAB (OUTPATIENT)
Dept: INTERNAL MEDICINE | Facility: CLINIC | Age: 57
End: 2022-12-08
Payer: MEDICAID

## 2022-12-08 DIAGNOSIS — E11.9 CONTROLLED TYPE 2 DIABETES MELLITUS WITHOUT COMPLICATION, WITHOUT LONG-TERM CURRENT USE OF INSULIN: ICD-10-CM

## 2022-12-08 DIAGNOSIS — E89.0 POSTOPERATIVE HYPOTHYROIDISM: Primary | ICD-10-CM

## 2022-12-09 LAB
ALBUMIN SERPL-MCNC: 3.9 G/DL (ref 3.8–4.9)
ALBUMIN/GLOB SERPL: 1.3 {RATIO} (ref 1.2–2.2)
ALP SERPL-CCNC: 59 IU/L (ref 44–121)
ALT SERPL-CCNC: 29 IU/L (ref 0–32)
AST SERPL-CCNC: 19 IU/L (ref 0–40)
BASOPHILS # BLD AUTO: 0 X10E3/UL (ref 0–0.2)
BASOPHILS NFR BLD AUTO: 1 %
BILIRUB SERPL-MCNC: 0.3 MG/DL (ref 0–1.2)
BUN SERPL-MCNC: 16 MG/DL (ref 6–24)
BUN/CREAT SERPL: 18 (ref 9–23)
CALCIUM SERPL-MCNC: 9.2 MG/DL (ref 8.7–10.2)
CHLORIDE SERPL-SCNC: 99 MMOL/L (ref 96–106)
CHOLEST SERPL-MCNC: 108 MG/DL (ref 100–199)
CO2 SERPL-SCNC: 25 MMOL/L (ref 20–29)
CREAT SERPL-MCNC: 0.89 MG/DL (ref 0.57–1)
EGFRCR SERPLBLD CKD-EPI 2021: 75 ML/MIN/1.73
EOSINOPHIL # BLD AUTO: 0.2 X10E3/UL (ref 0–0.4)
EOSINOPHIL NFR BLD AUTO: 3 %
ERYTHROCYTE [DISTWIDTH] IN BLOOD BY AUTOMATED COUNT: 12.9 % (ref 11.7–15.4)
GLOBULIN SER CALC-MCNC: 3 G/DL (ref 1.5–4.5)
GLUCOSE SERPL-MCNC: 123 MG/DL (ref 70–99)
HCT VFR BLD AUTO: 40.9 % (ref 34–46.6)
HDLC SERPL-MCNC: 34 MG/DL
HGB BLD-MCNC: 13 G/DL (ref 11.1–15.9)
IMM GRANULOCYTES # BLD AUTO: 0 X10E3/UL (ref 0–0.1)
IMM GRANULOCYTES NFR BLD AUTO: 0 %
LDLC SERPL CALC-MCNC: 52 MG/DL (ref 0–99)
LYMPHOCYTES # BLD AUTO: 2.5 X10E3/UL (ref 0.7–3.1)
LYMPHOCYTES NFR BLD AUTO: 41 %
MCH RBC QN AUTO: 26.4 PG (ref 26.6–33)
MCHC RBC AUTO-ENTMCNC: 31.8 G/DL (ref 31.5–35.7)
MCV RBC AUTO: 83 FL (ref 79–97)
MONOCYTES # BLD AUTO: 0.5 X10E3/UL (ref 0.1–0.9)
MONOCYTES NFR BLD AUTO: 8 %
NEUTROPHILS # BLD AUTO: 2.8 X10E3/UL (ref 1.4–7)
NEUTROPHILS NFR BLD AUTO: 47 %
PLATELET # BLD AUTO: 265 X10E3/UL (ref 150–450)
POTASSIUM SERPL-SCNC: 3.9 MMOL/L (ref 3.5–5.2)
PROT SERPL-MCNC: 6.9 G/DL (ref 6–8.5)
RBC # BLD AUTO: 4.92 X10E6/UL (ref 3.77–5.28)
SODIUM SERPL-SCNC: 139 MMOL/L (ref 134–144)
T3FREE SERPL-MCNC: 4.4 PG/ML (ref 2–4.4)
T4 FREE SERPL-MCNC: 2.01 NG/DL (ref 0.82–1.77)
TRIGL SERPL-MCNC: 119 MG/DL (ref 0–149)
TSH SERPL DL<=0.005 MIU/L-ACNC: 0.01 UIU/ML (ref 0.45–4.5)
VLDLC SERPL CALC-MCNC: 22 MG/DL (ref 5–40)
WBC # BLD AUTO: 6 X10E3/UL (ref 3.4–10.8)

## 2022-12-13 ENCOUNTER — OFFICE VISIT (OUTPATIENT)
Dept: ENDOCRINOLOGY | Facility: CLINIC | Age: 57
End: 2022-12-13

## 2022-12-13 ENCOUNTER — SPECIALTY PHARMACY (OUTPATIENT)
Dept: ENDOCRINOLOGY | Facility: CLINIC | Age: 57
End: 2022-12-13

## 2022-12-13 VITALS
OXYGEN SATURATION: 99 % | BODY MASS INDEX: 26.55 KG/M2 | DIASTOLIC BLOOD PRESSURE: 70 MMHG | SYSTOLIC BLOOD PRESSURE: 120 MMHG | HEART RATE: 79 BPM | WEIGHT: 196 LBS | HEIGHT: 72 IN

## 2022-12-13 DIAGNOSIS — E11.59 HYPERTENSION ASSOCIATED WITH DIABETES: ICD-10-CM

## 2022-12-13 DIAGNOSIS — E89.0 POSTOPERATIVE HYPOTHYROIDISM: Primary | ICD-10-CM

## 2022-12-13 DIAGNOSIS — E11.69 MIXED DIABETIC HYPERLIPIDEMIA ASSOCIATED WITH TYPE 2 DIABETES MELLITUS: ICD-10-CM

## 2022-12-13 DIAGNOSIS — E78.2 MIXED DIABETIC HYPERLIPIDEMIA ASSOCIATED WITH TYPE 2 DIABETES MELLITUS: ICD-10-CM

## 2022-12-13 DIAGNOSIS — I15.2 HYPERTENSION ASSOCIATED WITH DIABETES: ICD-10-CM

## 2022-12-13 PROCEDURE — 99214 OFFICE O/P EST MOD 30 MIN: CPT | Performed by: INTERNAL MEDICINE

## 2022-12-13 RX ORDER — LEVOTHYROXINE SODIUM 200 UG/1
200 CAPSULE ORAL DAILY
Qty: 30 CAPSULE | Refills: 11 | Status: SHIPPED | OUTPATIENT
Start: 2022-12-13

## 2022-12-13 RX ORDER — SEMAGLUTIDE 1.34 MG/ML
1 INJECTION, SOLUTION SUBCUTANEOUS WEEKLY
Qty: 3 ML | Refills: 11 | Status: SHIPPED | OUTPATIENT
Start: 2022-12-13

## 2022-12-13 NOTE — PROGRESS NOTES
Patient was seen in office  She is doing well on ozempic   Reports losing 6 pounds  She want to switch to mounjaro but has plenty of room to continue ozempic for now  She is moving to ozempic 1 mg

## 2022-12-13 NOTE — PROGRESS NOTES
"Chief Complaint   Patient presents with   • Diabetes     T2         History of Present Illness    57 y.o. female   2019 right thyroidectomy for obstructive goiter  On tirosint 200 daily w better absorption than levothyroxine   She is compliant   She is realiable    Variable TSH readings.   Now TSH suppressed on 7 liquid doses per weekl     --    Type 2 DM  No CAD , no Stroke  No microvascular disease     Main symptoms, fatigue, weight gain         ==========================================  Physical Exam  /70   Pulse 79   Ht 188 cm (74\")   Wt 88.9 kg (196 lb)   SpO2 99%   BMI 25.16 kg/m²   AOx3  No Goiter , no carotid bruit  RRR  CTA  No Edema     ==========================================    Laboratory Workup    Lab Results   Component Value Date    WBC 6.0 12/08/2022    HGB 13.0 12/08/2022    HCT 40.9 12/08/2022    MCV 83 12/08/2022     12/08/2022       Lab Results   Component Value Date    GLUCOSE 123 (H) 12/08/2022    BUN 16 12/08/2022    CREATININE 0.89 12/08/2022    EGFR 66.6 11/14/2022    EGFRIFNONA >60 10/15/2021    EGFRIFAFRI >59 10/15/2021    BCR 18 12/08/2022     12/08/2022    K 3.9 12/08/2022    CL 99 12/08/2022    CO2 25 12/08/2022    CALCIUM 9.2 12/08/2022    ALBUMIN 3.9 12/08/2022    GLOB 3.1 11/14/2022    BILITOT 0.3 12/08/2022    ALKPHOS 59 12/08/2022    AST 19 12/08/2022    ALT 29 12/08/2022    AGRATIO 1.3 11/14/2022       ==========================================      ICD-10-CM ICD-9-CM   1. Postoperative hypothyroidism  E89.0 244.0   2. Hypertension associated with diabetes (HCC)  E11.59 250.80    I15.2 401.9   3. Mixed diabetic hyperlipidemia associated with type 2 diabetes mellitus (HCC)  E11.69 250.80    E78.2 272.2   -    --    Hypothyroidism     Lab Results   Component Value Date    TSH 0.014 (L) 12/08/2022       Post operative    tirosint 200 daily - change to 6   ---    Type 2 DM  Intolerant to metformin   trulicity changed to ozempic , tolerating 0.5 mg    We will " "increase to 1 mg     Anticipate mounjaro , but needs 3 months of ozempic.      --    HTN   /70   Pulse 79   Ht 188 cm (74\")   Wt 88.9 kg (196 lb)   SpO2 99%   BMI 25.16 kg/m²   Controlled    Lipids  Lab Results   Component Value Date    CHLPL 108 12/08/2022    TRIG 119 12/08/2022    HDL 34 (L) 12/08/2022    LDL 52 12/08/2022     On statin         appt in 6 weeks      No orders of the defined types were placed in this encounter.               This document has been electronically signed by Reza Saez MD on December 13, 2022 10:55 CST                      "

## 2022-12-19 DIAGNOSIS — F51.01 PRIMARY INSOMNIA: ICD-10-CM

## 2022-12-19 RX ORDER — TRAZODONE HYDROCHLORIDE 50 MG/1
50 TABLET ORAL NIGHTLY
Qty: 30 TABLET | Refills: 1 | Status: SHIPPED | OUTPATIENT
Start: 2022-12-19

## 2022-12-28 ENCOUNTER — TELEPHONE (OUTPATIENT)
Dept: INTERNAL MEDICINE | Facility: CLINIC | Age: 57
End: 2022-12-28

## 2022-12-28 NOTE — TELEPHONE ENCOUNTER
Attempted to call patient to discuss administering paxlovid and the consenting process. No answer, left VM for her to call office.

## 2022-12-28 NOTE — TELEPHONE ENCOUNTER
----- Message from Elena Wadsworth MA sent at 12/28/2022 10:32 AM CST -----  Regarding: Tested Positive for Covid  Contact: [phone]  FY      ----- Message -----  From: Heidi Dhaliwal  Sent: 12/28/2022   9:46 AM CST  To: Emily Douglas [facility]  Subject: Tested Positive for Covid                        Yes, that will be fine, just something to help me through Covid

## 2023-01-05 ENCOUNTER — SPECIALTY PHARMACY (OUTPATIENT)
Dept: ENDOCRINOLOGY | Facility: CLINIC | Age: 58
End: 2023-01-05
Payer: MEDICAID

## 2023-01-05 NOTE — PROGRESS NOTES
Specialty Pharmacy Refill Coordination Note     Heidi is a 57 y.o. female contacted today regarding refills of  ozempic specialty medication(s).    Reviewed and verified with patient:  Allergies       Specialty medication(s) and dose(s) confirmed: yes    Refill Questions    Flowsheet Row Most Recent Value   Changes to allergies? No   Changes to medications? No   New conditions since last clinic visit No   Unplanned office visit, urgent care, ED, or hospital admission in the last 4 weeks  No   How does patient/caregiver feel medication is working? Very good   Financial problems or insurance changes  No   Since the previous refill, were any specialty medication doses or scheduled injections missed or delayed?  No   Does this patient require a clinical escalation to a pharmacist? No          Delivery Questions    Flowsheet Row Most Recent Value   Delivery method FedEx   Delivery address correct? Yes   Number of medications in delivery 1   Medication being filled and delivered ozempic   Doses left of specialty medications 0   Is there any medication that is due not being filled? No   Cooler needed? Yes   Do any medications need mixed or dated? No   Additional comments 0.00   Questions or concerns for the pharmacist? No   Are any medications first time fills? No        Patient is doing well on the ozempic.  We will follow up with her in 3 months, and mail out her RX Tuesday at pts request.          Follow-up: 3 months.      Shay Cruz  Specialty Pharmacy Technician

## 2023-01-11 ENCOUNTER — SPECIALTY PHARMACY (OUTPATIENT)
Dept: ENDOCRINOLOGY | Facility: CLINIC | Age: 58
End: 2023-01-11
Payer: MEDICAID

## 2023-01-11 ENCOUNTER — LAB (OUTPATIENT)
Dept: INTERNAL MEDICINE | Facility: CLINIC | Age: 58
End: 2023-01-11
Payer: MEDICAID

## 2023-02-09 ENCOUNTER — LAB (OUTPATIENT)
Dept: INTERNAL MEDICINE | Facility: CLINIC | Age: 58
End: 2023-02-09
Payer: MEDICAID

## 2023-02-09 DIAGNOSIS — E11.59 HYPERTENSION ASSOCIATED WITH DIABETES: ICD-10-CM

## 2023-02-09 DIAGNOSIS — I15.2 HYPERTENSION ASSOCIATED WITH DIABETES: ICD-10-CM

## 2023-02-09 DIAGNOSIS — E89.0 POSTOPERATIVE HYPOTHYROIDISM: ICD-10-CM

## 2023-02-09 DIAGNOSIS — E11.69 MIXED DIABETIC HYPERLIPIDEMIA ASSOCIATED WITH TYPE 2 DIABETES MELLITUS: ICD-10-CM

## 2023-02-09 DIAGNOSIS — E78.2 MIXED DIABETIC HYPERLIPIDEMIA ASSOCIATED WITH TYPE 2 DIABETES MELLITUS: ICD-10-CM

## 2023-02-10 DIAGNOSIS — E78.2 MIXED DIABETIC HYPERLIPIDEMIA ASSOCIATED WITH TYPE 2 DIABETES MELLITUS: ICD-10-CM

## 2023-02-10 DIAGNOSIS — E89.0 POSTOPERATIVE HYPOTHYROIDISM: ICD-10-CM

## 2023-02-10 DIAGNOSIS — E11.59 HYPERTENSION ASSOCIATED WITH DIABETES: ICD-10-CM

## 2023-02-10 DIAGNOSIS — E11.69 MIXED DIABETIC HYPERLIPIDEMIA ASSOCIATED WITH TYPE 2 DIABETES MELLITUS: ICD-10-CM

## 2023-02-10 DIAGNOSIS — I15.2 HYPERTENSION ASSOCIATED WITH DIABETES: ICD-10-CM

## 2023-02-17 ENCOUNTER — OFFICE VISIT (OUTPATIENT)
Dept: INTERNAL MEDICINE | Facility: CLINIC | Age: 58
End: 2023-02-17
Payer: MEDICAID

## 2023-02-17 VITALS
HEART RATE: 94 BPM | WEIGHT: 191 LBS | BODY MASS INDEX: 25.87 KG/M2 | HEIGHT: 72 IN | TEMPERATURE: 98.2 F | SYSTOLIC BLOOD PRESSURE: 134 MMHG | RESPIRATION RATE: 18 BRPM | DIASTOLIC BLOOD PRESSURE: 83 MMHG | OXYGEN SATURATION: 97 %

## 2023-02-17 DIAGNOSIS — E11.9 TYPE 2 DIABETES MELLITUS WITHOUT COMPLICATION, UNSPECIFIED WHETHER LONG TERM INSULIN USE: ICD-10-CM

## 2023-02-17 DIAGNOSIS — E11.69 MIXED DIABETIC HYPERLIPIDEMIA ASSOCIATED WITH TYPE 2 DIABETES MELLITUS: ICD-10-CM

## 2023-02-17 DIAGNOSIS — E78.2 MIXED DIABETIC HYPERLIPIDEMIA ASSOCIATED WITH TYPE 2 DIABETES MELLITUS: ICD-10-CM

## 2023-02-17 DIAGNOSIS — I15.2 HYPERTENSION ASSOCIATED WITH DIABETES: ICD-10-CM

## 2023-02-17 DIAGNOSIS — J01.00 ACUTE MAXILLARY SINUSITIS, RECURRENCE NOT SPECIFIED: Primary | ICD-10-CM

## 2023-02-17 DIAGNOSIS — E11.59 HYPERTENSION ASSOCIATED WITH DIABETES: ICD-10-CM

## 2023-02-17 PROCEDURE — 99214 OFFICE O/P EST MOD 30 MIN: CPT | Performed by: NURSE PRACTITIONER

## 2023-02-17 PROCEDURE — 96372 THER/PROPH/DIAG INJ SC/IM: CPT | Performed by: NURSE PRACTITIONER

## 2023-02-17 RX ORDER — AMOXICILLIN AND CLAVULANATE POTASSIUM 875; 125 MG/1; MG/1
1 TABLET, FILM COATED ORAL 2 TIMES DAILY
Qty: 20 TABLET | Refills: 0 | Status: SHIPPED | OUTPATIENT
Start: 2023-02-17

## 2023-02-17 RX ORDER — METHYLPREDNISOLONE SODIUM SUCCINATE 40 MG/ML
40 INJECTION, POWDER, LYOPHILIZED, FOR SOLUTION INTRAMUSCULAR; INTRAVENOUS ONCE
Status: COMPLETED | OUTPATIENT
Start: 2023-02-17 | End: 2023-02-17

## 2023-02-17 RX ADMIN — METHYLPREDNISOLONE SODIUM SUCCINATE 40 MG: 40 INJECTION, POWDER, LYOPHILIZED, FOR SOLUTION INTRAMUSCULAR; INTRAVENOUS at 08:40

## 2023-02-17 NOTE — PROGRESS NOTES
Answers for HPI/ROS submitted by the patient on 2/16/2023  Please describe your symptoms.: Check up..., Also have anal leakage  Have you had these symptoms before?: Yes  How long have you been having these symptoms?: Greater than 2 weeks  Please list any medications you are currently taking for this condition.: None  Please describe any probable cause for these symptoms. : Done know, it just happens 3+ times a week  What is the primary reason for your visit?: Other            Subjective     Chief Complaint   Patient presents with   • Hypothyroidism       History of Present Illness  Ms. HILL WALTER presents today for a follow-up on diabetes and thyroid issues.    The patient reports she is currently taking Ozempic 1 mg. She notes she had her test done 02/10/2023. She states she saw Dr. Saez on 12/13/2022, and was put on Onglyza then. Her last A1c from 11/14/2022 was 6.1 percent. She states Dr. Saez has started her on liquid medication, which absorbs better, and she takes it Sunday through Friday, and skip Saturday. She states Dr. Saez told her instead of changing it all the time, he will just take days away. She states she is taking Hyzaar for her blood pressure. She reports she feels her weight loss is due to the injections.    The patient reports she is having anal leakage. She denies feeling pressure in the rectum, and she can not make it to the bathroom. She states when she is walking, it is wet. She denies it being foul smelling. She states it is a liquid, and it is a little color to it. She denies being constipated. She states she is having regular bowel movements. She denies sticking anything in her rectum.     The patient reports she has had congestion and facial pressure since Tuesday, 02/14/2023. She denies fevers, shortness of breath, and her ears were full and popping. She states her face hurts, and it feels pressure. She reports the drainage is clear. She notes her throat is irritated and  "\"grainy\". She states she took only 2 Coricidin D on Wednesday. She notes she has been sitting up attempting to sleep due to not being able to lie down and rest.     The patient reports she had a colonoscopy when she was 50 years old and everything was fine.     The patient reports she did not get her disability. She states she was notified 2 days before Regino that she was not approved.     Patient's PMR from outside medical facility reviewed and noted.    Review of Systems   Otherwise complete ROS reviewed and negative except as mentioned in the HPI.    Past Medical History:   Past Medical History:   Diagnosis Date   • Anxiety    • Depression    • Diabetes mellitus (HCC)    • Hyperlipidemia    • Hypertension    • Hyperthyroidism    • Hypothyroidism 2010 2019 had right side removed   • Obesity    • Tremor    • Vasovagal syncope      Past Surgical History:  Past Surgical History:   Procedure Laterality Date   • CARPAL TUNNEL RELEASE Right 06/28/2022    Procedure: RIGHT CARPAL TUNNEL RELEASE;  Surgeon: Zeyad Gilbert MD;  Location: [facility] OR;  Service: Orthopedics;  Laterality: Right;   • CARPAL TUNNEL RELEASE Left 8/2/2022    Procedure: LEFT CARPAL TUNNEL RELEASE;  Surgeon: Zeyad Gilbert MD;  Location: [facility] OR;  Service: Orthopedics;  Laterality: Left;   • FOOT SURGERY Bilateral     HAMMERTOE AND BUNION REPAIR; 2017 & 2019   • HYSTERECTOMY     • THYROIDECTOMY, PARTIAL Right      Social History:  reports that she has never smoked. She has never used smokeless tobacco. She reports that she does not drink alcohol and does not use drugs.    Family History: family history includes Cancer in her sister; Diabetes in her brother, maternal grandmother, and sister; Heart disease in her father; Hypertension in her brother and sister; Pulmonary embolism in her mother; Thyroid disease in her mother; Ulcerative colitis in her brother.       Allergies:  Allergies   Allergen Reactions   • Metformin And " "Related Nausea And Vomiting   • Codeine Rash   • Morphine Rash   • Propranolol Hcl GI Intolerance     Medications:  Prior to Admission medications    Medication Sig Start Date End Date Taking? Authorizing Provider   aspirin 81 MG EC tablet Take 81 mg by mouth.    Provider, MD Paulina   atorvastatin (LIPITOR) 20 MG tablet Take 1 tablet by mouth once daily 10/26/22   Mary Matson APRN   Blood Glucose Monitoring Suppl (FreeStyle Lite) w/Device kit See Admin Instructions. for testing 11/8/22   ProviderPaulina MD   escitalopram (Lexapro) 20 MG tablet Take 1 tablet by mouth Daily. 10/13/22   Mary Matson APRN   glucose blood test strip To test bid 11/8/22   Mary Matson APRN   glucose monitor monitoring kit 1 each Continuous. 11/8/22   Mary Matson APRN   Lancets misc 1 each 2 (Two) Times a Day. 11/8/22   Mary Matson APRN   Levothyroxine Sodium (Tirosint) 200 MCG capsule Take 1 capsule by mouth daily. 12/13/22   Reza Spencre MD   losartan-hydrochlorothiazide (HYZAAR) 50-12.5 MG per tablet Take 1 tablet by mouth once daily 10/21/22   Mary Matson APRN   meclizine (ANTIVERT) 25 MG tablet Take 1 tablet by mouth 3 (Three) Times a Day As Needed for Dizziness. 7/18/22   Mary Matson APRN   metoprolol tartrate (LOPRESSOR) 25 MG tablet Take 1 tablet by mouth 2 (Two) Times a Day. 10/4/22   Mary Matson APRN   Semaglutide, 1 MG/DOSE, (Ozempic, 1 MG/DOSE,) 4 MG/3ML solution pen-injector Inject 1 mg under the skin into the appropriate area as directed 1 (One) Time Per Week. 12/13/22   Reza Spencer MD   traZODone (DESYREL) 50 MG tablet Take 1 tablet by mouth Every Night. 12/19/22   Mary Matson APRN       Objective     Vital Signs: /83   Pulse 94   Temp 98.2 °F (36.8 °C)   Resp 18   Ht 188 cm (74\")   Wt 86.6 kg (191 lb)   SpO2 97%   BMI 24.52 kg/m²   Physical Exam  Vitals " reviewed.   Constitutional:       Appearance: She is well-developed.   HENT:      Head: Normocephalic and atraumatic.      Right Ear: Tympanic membrane is bulging.      Left Ear: Tympanic membrane is bulging.      Ears:      Comments: Maxillary sinus tenderness  Eyes:      Pupils: Pupils are equal, round, and reactive to light.   Neck:      Vascular: No JVD.   Cardiovascular:      Rate and Rhythm: Normal rate and regular rhythm.   Pulmonary:      Effort: Pulmonary effort is normal.   Abdominal:      General: Bowel sounds are normal.      Palpations: Abdomen is soft.   Musculoskeletal:         General: No deformity.      Cervical back: Normal range of motion and neck supple.   Lymphadenopathy:      Cervical: No cervical adenopathy.   Skin:     General: Skin is warm and dry.   Neurological:      Mental Status: She is alert and oriented to person, place, and time.   Psychiatric:         Behavior: Behavior normal.         Thought Content: Thought content normal.         Judgment: Judgment normal.         BMI is within normal parameters. No other follow-up for BMI required.      Results Reviewed:  Glucose   Date Value Ref Range Status   12/08/2022 123 (H) 70 - 99 mg/dL Final   11/14/2022 99 65 - 99 mg/dL Final   10/15/2021 121 (H) 74 - 109 mg/dL Final     BUN   Date Value Ref Range Status   12/08/2022 16 6 - 24 mg/dL Final   11/14/2022 9 6 - 20 mg/dL Final   10/15/2021 13 6 - 20 mg/dL Final     Creatinine   Date Value Ref Range Status   12/08/2022 0.89 0.57 - 1.00 mg/dL Final   11/14/2022 0.98 0.57 - 1.00 mg/dL Final   10/15/2021 0.8 0.5 - 0.9 mg/dL Final     Sodium   Date Value Ref Range Status   12/08/2022 139 134 - 144 mmol/L Final   11/14/2022 141 136 - 145 mmol/L Final   10/15/2021 138 136 - 145 mmol/L Final     Potassium   Date Value Ref Range Status   12/08/2022 3.9 3.5 - 5.2 mmol/L Final   11/14/2022 3.9 3.5 - 5.2 mmol/L Final   10/15/2021 4.1 3.5 - 5.0 mmol/L Final     Chloride   Date Value Ref Range Status    12/08/2022 99 96 - 106 mmol/L Final   11/14/2022 102 98 - 107 mmol/L Final   10/15/2021 100 98 - 111 mmol/L Final     CO2   Date Value Ref Range Status   11/14/2022 32.0 (H) 22.0 - 29.0 mmol/L Final   10/15/2021 28 22 - 29 mmol/L Final     Total CO2   Date Value Ref Range Status   12/08/2022 25 20 - 29 mmol/L Final     Calcium   Date Value Ref Range Status   12/08/2022 9.2 8.7 - 10.2 mg/dL Final   11/14/2022 9.1 8.6 - 10.5 mg/dL Final   10/15/2021 9.3 8.6 - 10.0 mg/dL Final     ALT (SGPT)   Date Value Ref Range Status   12/08/2022 29 0 - 32 IU/L Final   11/14/2022 30 1 - 33 U/L Final   10/15/2021 31 5 - 33 U/L Final     AST (SGOT)   Date Value Ref Range Status   12/08/2022 19 0 - 40 IU/L Final   11/14/2022 28 1 - 32 U/L Final   10/15/2021 25 5 - 32 U/L Final     WBC   Date Value Ref Range Status   12/08/2022 6.0 3.4 - 10.8 x10E3/uL Final   10/15/2021 5.8 4.8 - 10.8 K/uL Final     Hematocrit   Date Value Ref Range Status   12/08/2022 40.9 34.0 - 46.6 % Final   11/14/2022 41.1 34.0 - 46.6 % Final   10/15/2021 43.2 37.0 - 47.0 % Final     Platelets   Date Value Ref Range Status   12/08/2022 265 150 - 450 x10E3/uL Final   11/14/2022 260 140 - 450 10*3/mm3 Final   10/15/2021 274 130 - 400 K/uL Final     Triglycerides   Date Value Ref Range Status   12/08/2022 119 0 - 149 mg/dL Final   10/15/2021 134 0 - 149 mg/dL Final     HDL Cholesterol   Date Value Ref Range Status   12/08/2022 34 (L) >39 mg/dL Final   10/15/2021 41 (L) 65 - 121 mg/dL Final     Comment:     VALUES>60 MG/DL ARE ASSOCIATED WITH A DECREASED RISK OF  ATHEROSCLEROTIC CARDIOVASCULAR DISEASE     LDL Cholesterol    Date Value Ref Range Status   10/15/2021 95 <100 mg/dL Final     Comment:     <100 MG/DL=OPITIMAL    100-129 MG/DL=DESIRABLE    130-159 MG/DL BORDERLINE=INCREASED RISK OF ATHEROSCLEROTIC  CARDIOVASCULAR DISEASE    > OR = 160 MG/DL=ASSOCIATED WITH AN INCREASE RISK OF  ATHEROSCLEROTIC CARDIOVASCULAR DISEASE     LDL Chol Calc (NIH)   Date Value Ref  Range Status   12/08/2022 52 0 - 99 mg/dL Final     Hemoglobin A1C   Date Value Ref Range Status   11/14/2022 6.10 (H) 4.80 - 5.60 % Final   10/15/2021 6.2 (H) 4.0 - 6.0 % Final     Comment:     HbA1c levels >6% are an indication of hyperglycemia during the preceding 2  to 3 months or longer.    HbA1c levels may reach 20% or higher in poorly controlled diabetes.  Therapeutic action is suggested at levels above 8%.    Diabetes patients with HbA1c levels below 7% meet the goal of the American  Diabetes Association.    HbA1c levels below the established reference range may indicate recent  episodes of hypoglycemia, the presence of Hb variants, or shortened lifetime  of erythrocytes.          Assessment / Plan     Assessment/Plan:  Diagnoses and all orders for this visit:    1. Acute maxillary sinusitis, recurrence not specified (Primary)  -     methylPREDNISolone sodium succinate (SOLU-Medrol) injection 40 mg  -     amoxicillin-clavulanate (Augmentin) 875-125 MG per tablet; Take 1 tablet by mouth 2 (Two) Times a Day.  Dispense: 20 tablet; Refill: 0    2. Type 2 diabetes mellitus without complication, unspecified whether long term insulin use (HCC)  -     MicroAlbumin, Urine, Random - Urine, Clean Catch    3. Hypertension associated with diabetes (HCC)    4. Mixed diabetic hyperlipidemia associated with type 2 diabetes mellitus (HCC)      No follow-ups on file. unless patient needs to be seen sooner or acute issues arise.    Code Status: Full.     I have discussed the patient results/orders and and plan/recommendation with them at today's visit.      JAKY Diaz   02/17/2023    Transcribed from ambient dictation for JAKY Diaz by Lizbet Stallings.  02/17/23   10:22 CST    Patient or patient representative verbalized consent to the visit recording.  I have personally performed the services described in this document as transcribed by the above individual, and it is both accurate and  complete.  Mary Matson, APRN  2/17/2023  10:25 CST

## 2023-02-18 LAB — MICROALBUMIN UR-MCNC: 19.5 UG/ML

## 2023-03-07 ENCOUNTER — SPECIALTY PHARMACY (OUTPATIENT)
Dept: ENDOCRINOLOGY | Facility: CLINIC | Age: 58
End: 2023-03-07
Payer: MEDICAID

## 2023-03-07 NOTE — PROGRESS NOTES
Specialty Pharmacy Refill Coordination Note     Heidi is a 57 y.o. female contacted today regarding refills of  tirosint  specialty medication(s).    Reviewed and verified with patient:       Specialty medication(s) and dose(s) confirmed: yes, no    Refill Questions    Flowsheet Row Most Recent Value   Changes to allergies? No   Changes to medications? No   Financial problems or insurance changes  No   Since the previous refill, were any specialty medication doses or scheduled injections missed or delayed?  No   Does this patient require a clinical escalation to a pharmacist? No          Delivery Questions    Flowsheet Row Most Recent Value   Delivery method FedEx   Delivery address correct? Yes   Delivery phone number [phone]   Number of medications in delivery 1   Medication being filled and delivered tirosint   Questions or concerns for the pharmacist? No   Are any medications first time fills? No                 Follow-up: 30 day(s)     Sherrie Menjivar, Pharmacy Technician  Specialty Pharmacy Technician

## 2023-03-09 ENCOUNTER — LAB (OUTPATIENT)
Dept: INTERNAL MEDICINE | Facility: CLINIC | Age: 58
End: 2023-03-09
Payer: MEDICAID

## 2023-03-10 DIAGNOSIS — E78.2 MIXED DIABETIC HYPERLIPIDEMIA ASSOCIATED WITH TYPE 2 DIABETES MELLITUS: ICD-10-CM

## 2023-03-10 DIAGNOSIS — E89.0 POSTOPERATIVE HYPOTHYROIDISM: ICD-10-CM

## 2023-03-10 DIAGNOSIS — E11.69 MIXED DIABETIC HYPERLIPIDEMIA ASSOCIATED WITH TYPE 2 DIABETES MELLITUS: ICD-10-CM

## 2023-03-10 DIAGNOSIS — I15.2 HYPERTENSION ASSOCIATED WITH DIABETES: ICD-10-CM

## 2023-03-10 DIAGNOSIS — E11.59 HYPERTENSION ASSOCIATED WITH DIABETES: ICD-10-CM

## 2023-03-22 DIAGNOSIS — I15.2 HYPERTENSION ASSOCIATED WITH DIABETES: ICD-10-CM

## 2023-03-22 DIAGNOSIS — E11.9 CONTROLLED TYPE 2 DIABETES MELLITUS WITHOUT COMPLICATION, WITHOUT LONG-TERM CURRENT USE OF INSULIN: ICD-10-CM

## 2023-03-22 DIAGNOSIS — E11.59 HYPERTENSION ASSOCIATED WITH DIABETES: ICD-10-CM

## 2023-03-22 DIAGNOSIS — E78.2 MIXED DIABETIC HYPERLIPIDEMIA ASSOCIATED WITH TYPE 2 DIABETES MELLITUS: ICD-10-CM

## 2023-03-22 DIAGNOSIS — E89.0 POSTOPERATIVE HYPOTHYROIDISM: Primary | ICD-10-CM

## 2023-03-22 DIAGNOSIS — E11.69 MIXED DIABETIC HYPERLIPIDEMIA ASSOCIATED WITH TYPE 2 DIABETES MELLITUS: ICD-10-CM

## 2023-03-30 ENCOUNTER — SPECIALTY PHARMACY (OUTPATIENT)
Dept: ENDOCRINOLOGY | Facility: CLINIC | Age: 58
End: 2023-03-30
Payer: MEDICAID

## 2023-03-30 NOTE — PROGRESS NOTES
Specialty Pharmacy Refill Coordination Note     Heidi is a 57 y.o. female contacted today regarding refills of  ozempic specialty medication(s).    Reviewed and verified with patient:       Specialty medication(s) and dose(s) confirmed: yes    Refill Questions    Flowsheet Row Most Recent Value   Changes to allergies? No   Changes to medications? No   New conditions since last clinic visit No   Unplanned office visit, urgent care, ED, or hospital admission in the last 4 weeks  No   How does patient/caregiver feel medication is working? Very good   Financial problems or insurance changes  No   Since the previous refill, were any specialty medication doses or scheduled injections missed or delayed?  No   Does this patient require a clinical escalation to a pharmacist? No          Delivery Questions    Flowsheet Row Most Recent Value   Delivery method FedEx   Delivery address correct? Yes   Delivery phone number [phone]   Number of medications in delivery 1   Medication being filled and delivered ozempic   Doses left of specialty medications 1   Is there any medication that is due not being filled? No   Supplies needed? No supplies needed   Do any medications need mixed or dated? No   Copay form of payment Credit card on file   Additional comments 0.00   Questions or concerns for the pharmacist? No   Are any medications first time fills? No        Pt request only 1 pen because she will be in to see the provider in 2 weeks.  Mailing out on Monday.          Follow-up: 1 month.      Shay Cruz  Specialty Pharmacy Technician

## 2023-04-07 ENCOUNTER — LAB (OUTPATIENT)
Dept: INTERNAL MEDICINE | Facility: CLINIC | Age: 58
End: 2023-04-07
Payer: MEDICAID

## 2023-04-07 DIAGNOSIS — E11.69 MIXED DIABETIC HYPERLIPIDEMIA ASSOCIATED WITH TYPE 2 DIABETES MELLITUS: ICD-10-CM

## 2023-04-07 DIAGNOSIS — I15.2 HYPERTENSION ASSOCIATED WITH DIABETES: ICD-10-CM

## 2023-04-07 DIAGNOSIS — E78.2 MIXED DIABETIC HYPERLIPIDEMIA ASSOCIATED WITH TYPE 2 DIABETES MELLITUS: ICD-10-CM

## 2023-04-07 DIAGNOSIS — E11.9 CONTROLLED TYPE 2 DIABETES MELLITUS WITHOUT COMPLICATION, WITHOUT LONG-TERM CURRENT USE OF INSULIN: ICD-10-CM

## 2023-04-07 DIAGNOSIS — E89.0 POSTOPERATIVE HYPOTHYROIDISM: ICD-10-CM

## 2023-04-07 DIAGNOSIS — E11.59 HYPERTENSION ASSOCIATED WITH DIABETES: ICD-10-CM

## 2023-04-08 LAB
ALBUMIN SERPL-MCNC: 3.9 G/DL (ref 3.8–4.9)
ALBUMIN/GLOB SERPL: 1.4 {RATIO} (ref 1.2–2.2)
ALP SERPL-CCNC: 60 IU/L (ref 44–121)
ALT SERPL-CCNC: 23 IU/L (ref 0–32)
AST SERPL-CCNC: 19 IU/L (ref 0–40)
BASOPHILS # BLD AUTO: 0.1 X10E3/UL (ref 0–0.2)
BASOPHILS NFR BLD AUTO: 1 %
BILIRUB SERPL-MCNC: 0.4 MG/DL (ref 0–1.2)
BUN SERPL-MCNC: 19 MG/DL (ref 6–24)
BUN/CREAT SERPL: 19 (ref 9–23)
CALCIUM SERPL-MCNC: 9.4 MG/DL (ref 8.7–10.2)
CHLORIDE SERPL-SCNC: 102 MMOL/L (ref 96–106)
CHOLEST SERPL-MCNC: 91 MG/DL (ref 100–199)
CO2 SERPL-SCNC: 25 MMOL/L (ref 20–29)
CREAT SERPL-MCNC: 0.98 MG/DL (ref 0.57–1)
EGFRCR SERPLBLD CKD-EPI 2021: 66 ML/MIN/1.73
EOSINOPHIL # BLD AUTO: 0.2 X10E3/UL (ref 0–0.4)
EOSINOPHIL NFR BLD AUTO: 3 %
ERYTHROCYTE [DISTWIDTH] IN BLOOD BY AUTOMATED COUNT: 12.8 % (ref 11.7–15.4)
GLOBULIN SER CALC-MCNC: 2.7 G/DL (ref 1.5–4.5)
GLUCOSE SERPL-MCNC: 108 MG/DL (ref 70–99)
HBA1C MFR BLD: 5.9 % (ref 4.8–5.6)
HCT VFR BLD AUTO: 40.4 % (ref 34–46.6)
HDLC SERPL-MCNC: 34 MG/DL
HGB BLD-MCNC: 12.7 G/DL (ref 11.1–15.9)
IMM GRANULOCYTES # BLD AUTO: 0 X10E3/UL (ref 0–0.1)
IMM GRANULOCYTES NFR BLD AUTO: 0 %
LDLC SERPL CALC-MCNC: 43 MG/DL (ref 0–99)
LYMPHOCYTES # BLD AUTO: 2.3 X10E3/UL (ref 0.7–3.1)
LYMPHOCYTES NFR BLD AUTO: 39 %
MCH RBC QN AUTO: 25.5 PG (ref 26.6–33)
MCHC RBC AUTO-ENTMCNC: 31.4 G/DL (ref 31.5–35.7)
MCV RBC AUTO: 81 FL (ref 79–97)
MICROALBUMIN UR-MCNC: 5.8 UG/ML
MONOCYTES # BLD AUTO: 0.6 X10E3/UL (ref 0.1–0.9)
MONOCYTES NFR BLD AUTO: 10 %
NEUTROPHILS # BLD AUTO: 2.7 X10E3/UL (ref 1.4–7)
NEUTROPHILS NFR BLD AUTO: 47 %
PLATELET # BLD AUTO: 280 X10E3/UL (ref 150–450)
POTASSIUM SERPL-SCNC: 4.4 MMOL/L (ref 3.5–5.2)
PROT SERPL-MCNC: 6.6 G/DL (ref 6–8.5)
RBC # BLD AUTO: 4.99 X10E6/UL (ref 3.77–5.28)
SODIUM SERPL-SCNC: 139 MMOL/L (ref 134–144)
T4 FREE SERPL-MCNC: 3.4 NG/DL (ref 0.82–1.77)
TRIGL SERPL-MCNC: 61 MG/DL (ref 0–149)
TSH SERPL DL<=0.005 MIU/L-ACNC: <0.005 UIU/ML (ref 0.45–4.5)
VIT B12 SERPL-MCNC: 450 PG/ML (ref 232–1245)
VLDLC SERPL CALC-MCNC: 14 MG/DL (ref 5–40)
WBC # BLD AUTO: 5.8 X10E3/UL (ref 3.4–10.8)

## 2023-04-13 ENCOUNTER — OFFICE VISIT (OUTPATIENT)
Dept: ENDOCRINOLOGY | Facility: CLINIC | Age: 58
End: 2023-04-13
Payer: MEDICAID

## 2023-04-13 VITALS
SYSTOLIC BLOOD PRESSURE: 118 MMHG | DIASTOLIC BLOOD PRESSURE: 60 MMHG | BODY MASS INDEX: 33.31 KG/M2 | HEIGHT: 62 IN | OXYGEN SATURATION: 97 % | HEART RATE: 94 BPM | WEIGHT: 181 LBS

## 2023-04-13 DIAGNOSIS — I15.2 HYPERTENSION ASSOCIATED WITH DIABETES: ICD-10-CM

## 2023-04-13 DIAGNOSIS — E78.2 MIXED DIABETIC HYPERLIPIDEMIA ASSOCIATED WITH TYPE 2 DIABETES MELLITUS: ICD-10-CM

## 2023-04-13 DIAGNOSIS — E89.0 POSTOPERATIVE HYPOTHYROIDISM: Primary | ICD-10-CM

## 2023-04-13 DIAGNOSIS — E11.69 MIXED DIABETIC HYPERLIPIDEMIA ASSOCIATED WITH TYPE 2 DIABETES MELLITUS: ICD-10-CM

## 2023-04-13 DIAGNOSIS — E11.9 CONTROLLED TYPE 2 DIABETES MELLITUS WITHOUT COMPLICATION, WITHOUT LONG-TERM CURRENT USE OF INSULIN: ICD-10-CM

## 2023-04-13 DIAGNOSIS — E11.59 HYPERTENSION ASSOCIATED WITH DIABETES: ICD-10-CM

## 2023-04-13 PROCEDURE — 3078F DIAST BP <80 MM HG: CPT | Performed by: INTERNAL MEDICINE

## 2023-04-13 PROCEDURE — 3074F SYST BP LT 130 MM HG: CPT | Performed by: INTERNAL MEDICINE

## 2023-04-13 PROCEDURE — 3044F HG A1C LEVEL LT 7.0%: CPT | Performed by: INTERNAL MEDICINE

## 2023-04-13 PROCEDURE — 99214 OFFICE O/P EST MOD 30 MIN: CPT | Performed by: INTERNAL MEDICINE

## 2023-04-13 RX ORDER — SEMAGLUTIDE 2.68 MG/ML
2 INJECTION, SOLUTION SUBCUTANEOUS WEEKLY
Qty: 9 ML | Refills: 3 | Status: SHIPPED | OUTPATIENT
Start: 2023-04-13

## 2023-04-13 NOTE — PATIENT INSTRUCTIONS
2 mg ozempic pen   36  clicks = 1 mg  48 clicks  = 1.5 mg  72 clicks = 2 mg     ===    Tirosint  200 mcgs  Decrease to 5 days of the week    Labs in 5 weeks but please my chart me so that I know you did them

## 2023-04-13 NOTE — PROGRESS NOTES
"Chief Complaint   Patient presents with   • Diabetes   • Hypothyroidism         History of Present Illness    57 y.o. female   2019 right thyroidectomy for obstructive goiter  On tirosint 200mcg vials  w better absorption than levothyroxine   She is compliant   She is realiable    Variable TSH readings.   Now TSH suppressed on 6 vials per week     --    Type 2 DM  No CAD , no Stroke  No microvascular disease     Main symptoms, fatigue, weight gain   Now 20 lbs weight loss       ==========================================  Physical Exam  /60   Pulse 94   Ht 157.5 cm (62\")   Wt 82.1 kg (181 lb)   SpO2 97%   BMI 33.11 kg/m²   AOx3  No Goiter , no carotid bruit  RRR  CTA  No Edema     ==========================================    Laboratory Workup    Lab Results   Component Value Date    WBC 5.8 04/07/2023    HGB 12.7 04/07/2023    HCT 40.4 04/07/2023    MCV 81 04/07/2023     04/07/2023       Lab Results   Component Value Date    GLUCOSE 108 (H) 04/07/2023    BUN 19 04/07/2023    CREATININE 0.98 04/07/2023    EGFR 66.6 11/14/2022    EGFRIFNONA >60 10/15/2021    EGFRIFAFRI >59 10/15/2021    BCR 19 04/07/2023     04/07/2023    K 4.4 04/07/2023     04/07/2023    CO2 25 04/07/2023    CALCIUM 9.4 04/07/2023    ALBUMIN 3.9 04/07/2023    GLOB 3.1 11/14/2022    BILITOT 0.4 04/07/2023    ALKPHOS 60 04/07/2023    AST 19 04/07/2023    ALT 23 04/07/2023    AGRATIO 1.3 11/14/2022       ==========================================      ICD-10-CM ICD-9-CM   1. Postoperative hypothyroidism  E89.0 244.0   2. Hypertension associated with diabetes  E11.59 250.80    I15.2 401.9   3. Mixed diabetic hyperlipidemia associated with type 2 diabetes mellitus  E11.69 250.80    E78.2 272.2   4. Controlled type 2 diabetes mellitus without complication, without long-term current use of insulin  E11.9 250.00   -    --    Hypothyroidism     Lab Results   Component Value Date    TSH <0.005 (L) 04/07/2023       Post " "operative    tirosint 200 daily - change to 6  Change to 5    ---    Type 2 DM  Intolerant to metformin   trulicity changed to ozempic , tolerating 0.5 mg    We will increase to 1 mg     Anticipate mounjaro , but needs 3 months of ozempic.      --    HTN   /60   Pulse 94   Ht 157.5 cm (62\")   Wt 82.1 kg (181 lb)   SpO2 97%   BMI 33.11 kg/m²   Controlled    Lipids  Lab Results   Component Value Date    CHLPL 91 (L) 04/07/2023    TRIG 61 04/07/2023    HDL 34 (L) 04/07/2023    LDL 43 04/07/2023     On statin         appt in 6 months , but My Chart message in 5 weeks       Orders Placed This Encounter   Procedures   • TSH     Standing Status:   Future     Standing Expiration Date:   4/13/2024     Order Specific Question:   Release to patient     Answer:   Routine Release   • T4, Free     Standing Status:   Future     Standing Expiration Date:   4/13/2024     Order Specific Question:   Release to patient     Answer:   Routine Release                This document has been electronically signed by Reza Saez MD on April 13, 2023 10:31 CDT                      "

## 2023-04-17 ENCOUNTER — OFFICE VISIT (OUTPATIENT)
Dept: FAMILY MEDICINE CLINIC | Facility: CLINIC | Age: 58
End: 2023-04-17
Payer: MEDICAID

## 2023-04-17 VITALS
RESPIRATION RATE: 16 BRPM | HEIGHT: 62 IN | DIASTOLIC BLOOD PRESSURE: 83 MMHG | OXYGEN SATURATION: 98 % | HEART RATE: 118 BPM | SYSTOLIC BLOOD PRESSURE: 123 MMHG | BODY MASS INDEX: 33.23 KG/M2 | WEIGHT: 180.6 LBS | TEMPERATURE: 98.9 F

## 2023-04-17 DIAGNOSIS — H66.92 LEFT ACUTE OTITIS MEDIA: Primary | ICD-10-CM

## 2023-04-17 RX ORDER — CEFDINIR 300 MG/1
300 CAPSULE ORAL 2 TIMES DAILY
Qty: 14 CAPSULE | Refills: 0 | Status: SHIPPED | OUTPATIENT
Start: 2023-04-17 | End: 2023-04-24

## 2023-04-17 NOTE — PROGRESS NOTES
Subjective     Chief Complaint   Patient presents with   • Earache     Since Saturday. Left ear.        History of Present Illness    Left ear pain since Saturday.  Hurts to yawn or open mouth widely.     Patient's PMR from outside medical facility reviewed and noted.    Review of Systems   Constitutional: Negative for fever.   HENT: Positive for ear pain. Negative for congestion, ear discharge, sinus pressure, sinus pain and sore throat.         Otherwise complete ROS reviewed and negative except as mentioned in the HPI.    Past Medical History:   Past Medical History:   Diagnosis Date   • Anxiety    • Depression    • Diabetes mellitus    • Hyperlipidemia    • Hypertension    • Hyperthyroidism    • Hypothyroidism 2010 2019 had right side removed   • Obesity    • Tremor    • Vasovagal syncope      Past Surgical History:  Past Surgical History:   Procedure Laterality Date   • CARPAL TUNNEL RELEASE Right 06/28/2022    Procedure: RIGHT CARPAL TUNNEL RELEASE;  Surgeon: Zeyad Gilbert MD;  Location: [facility] OR;  Service: Orthopedics;  Laterality: Right;   • CARPAL TUNNEL RELEASE Left 8/2/2022    Procedure: LEFT CARPAL TUNNEL RELEASE;  Surgeon: Zeyad Gilbert MD;  Location: [facility] OR;  Service: Orthopedics;  Laterality: Left;   • FOOT SURGERY Bilateral     HAMMERTOE AND BUNION REPAIR; 2017 & 2019   • HYSTERECTOMY     • THYROIDECTOMY, PARTIAL Right      Social History:  reports that she has never smoked. She has never used smokeless tobacco. She reports that she does not drink alcohol and does not use drugs.    Family History: family history includes Cancer in her sister; Diabetes in her brother, maternal grandmother, and sister; Heart disease in her father; Hypertension in her brother and sister; Pulmonary embolism in her mother; Thyroid disease in her mother; Ulcerative colitis in her brother.      Allergies:  Allergies   Allergen Reactions   • Metformin And Related Nausea And Vomiting   •  Codeine Rash   • Morphine Rash   • Propranolol Hcl GI Intolerance     Medications:  Prior to Admission medications    Medication Sig Start Date End Date Taking? Authorizing Provider   aspirin 81 MG EC tablet Take 1 tablet by mouth.   Yes ProviderPaulina MD   atorvastatin (LIPITOR) 20 MG tablet Take 1 tablet by mouth once daily 10/26/22  Yes Mary Matson APRN   Blood Glucose Monitoring Suppl (FreeStyle Lite) w/Device kit See Admin Instructions. for testing 11/8/22  Yes Paulina Shelby MD   escitalopram (Lexapro) 20 MG tablet Take 1 tablet by mouth Daily. 10/13/22  Yes Mary Matson APRN   glucose blood test strip To test bid 11/8/22  Yes Mary Matson APRN   glucose monitor monitoring kit 1 each Continuous. 11/8/22  Yes Mary Matson APRN   Lancets misc 1 each 2 (Two) Times a Day. 11/8/22  Yes Mary Matson APRN   Levothyroxine Sodium (Tirosint) 200 MCG capsule Take 1 capsule by mouth daily.  Patient taking differently: Take 200 mcg by mouth Daily. Takes one every day except Saturday 12/13/22  Yes Reza Spencer MD   losartan-hydrochlorothiazide (HYZAAR) 50-12.5 MG per tablet Take 1 tablet by mouth once daily 10/21/22  Yes Mary Matson APRN   meclizine (ANTIVERT) 25 MG tablet Take 1 tablet by mouth 3 (Three) Times a Day As Needed for Dizziness. 7/18/22  Yes Mary Matson APRN   metoprolol tartrate (LOPRESSOR) 25 MG tablet Take 1 tablet by mouth 2 (Two) Times a Day. 10/4/22  Yes Mary Matson APRN   Semaglutide, 2 MG/DOSE, (Ozempic, 2 MG/DOSE,) 8 MG/3ML solution pen-injector Inject 2 mg under the skin into the appropriate area as directed 1 (One) Time Per Week. 4/13/23  Yes Reza Spencer MD   traZODone (DESYREL) 50 MG tablet Take 1 tablet by mouth Every Night. 12/19/22  Yes Matosn, Mary Hiwot, APRN           Objective     Vital Signs: /83 (BP Location: Left arm, Patient Position:  "Sitting, Cuff Size: Adult)   Pulse 118   Temp 98.9 °F (37.2 °C) (Infrared)   Resp 16   Ht 157.5 cm (62\")   Wt 81.9 kg (180 lb 9.6 oz)   SpO2 98%   BMI 33.03 kg/m²   Physical Exam  Vitals and nursing note reviewed.   Constitutional:       Appearance: Normal appearance.   HENT:      Head: Normocephalic.      Right Ear: Tympanic membrane normal.      Left Ear: A middle ear effusion is present. Tympanic membrane is erythematous and bulging.      Nose: Nose normal.      Mouth/Throat:      Mouth: Mucous membranes are moist.   Eyes:      Extraocular Movements: Extraocular movements intact.      Pupils: Pupils are equal, round, and reactive to light.   Musculoskeletal:         General: Normal range of motion.      Cervical back: Normal range of motion.   Lymphadenopathy:      Cervical: No cervical adenopathy.   Skin:     General: Skin is warm and dry.   Neurological:      General: No focal deficit present.      Mental Status: She is alert and oriented to person, place, and time.   Psychiatric:         Mood and Affect: Mood normal.         Behavior: Behavior normal.         Thought Content: Thought content normal.         Judgment: Judgment normal.           Results Reviewed:  Glucose   Date Value Ref Range Status   04/07/2023 108 (H) 70 - 99 mg/dL Final   11/14/2022 99 65 - 99 mg/dL Final   10/15/2021 121 (H) 74 - 109 mg/dL Final     BUN   Date Value Ref Range Status   04/07/2023 19 6 - 24 mg/dL Final   11/14/2022 9 6 - 20 mg/dL Final   10/15/2021 13 6 - 20 mg/dL Final     Creatinine   Date Value Ref Range Status   04/07/2023 0.98 0.57 - 1.00 mg/dL Final   11/14/2022 0.98 0.57 - 1.00 mg/dL Final   10/15/2021 0.8 0.5 - 0.9 mg/dL Final     Sodium   Date Value Ref Range Status   04/07/2023 139 134 - 144 mmol/L Final   11/14/2022 141 136 - 145 mmol/L Final   10/15/2021 138 136 - 145 mmol/L Final     Potassium   Date Value Ref Range Status   04/07/2023 4.4 3.5 - 5.2 mmol/L Final   11/14/2022 3.9 3.5 - 5.2 mmol/L Final "   10/15/2021 4.1 3.5 - 5.0 mmol/L Final     Chloride   Date Value Ref Range Status   04/07/2023 102 96 - 106 mmol/L Final   11/14/2022 102 98 - 107 mmol/L Final   10/15/2021 100 98 - 111 mmol/L Final     CO2   Date Value Ref Range Status   11/14/2022 32.0 (H) 22.0 - 29.0 mmol/L Final   10/15/2021 28 22 - 29 mmol/L Final     Total CO2   Date Value Ref Range Status   04/07/2023 25 20 - 29 mmol/L Final     Calcium   Date Value Ref Range Status   04/07/2023 9.4 8.7 - 10.2 mg/dL Final   11/14/2022 9.1 8.6 - 10.5 mg/dL Final   10/15/2021 9.3 8.6 - 10.0 mg/dL Final     ALT (SGPT)   Date Value Ref Range Status   04/07/2023 23 0 - 32 IU/L Final   11/14/2022 30 1 - 33 U/L Final   10/15/2021 31 5 - 33 U/L Final     AST (SGOT)   Date Value Ref Range Status   04/07/2023 19 0 - 40 IU/L Final   11/14/2022 28 1 - 32 U/L Final   10/15/2021 25 5 - 32 U/L Final     WBC   Date Value Ref Range Status   04/07/2023 5.8 3.4 - 10.8 x10E3/uL Final   10/15/2021 5.8 4.8 - 10.8 K/uL Final     Hematocrit   Date Value Ref Range Status   04/07/2023 40.4 34.0 - 46.6 % Final   11/14/2022 41.1 34.0 - 46.6 % Final   10/15/2021 43.2 37.0 - 47.0 % Final     Platelets   Date Value Ref Range Status   04/07/2023 280 150 - 450 x10E3/uL Final   11/14/2022 260 140 - 450 10*3/mm3 Final   10/15/2021 274 130 - 400 K/uL Final     Triglycerides   Date Value Ref Range Status   04/07/2023 61 0 - 149 mg/dL Final   10/15/2021 134 0 - 149 mg/dL Final     HDL Cholesterol   Date Value Ref Range Status   04/07/2023 34 (L) >39 mg/dL Final   10/15/2021 41 (L) 65 - 121 mg/dL Final     Comment:     VALUES>60 MG/DL ARE ASSOCIATED WITH A DECREASED RISK OF  ATHEROSCLEROTIC CARDIOVASCULAR DISEASE     LDL Cholesterol    Date Value Ref Range Status   10/15/2021 95 <100 mg/dL Final     Comment:     <100 MG/DL=OPITIMAL    100-129 MG/DL=DESIRABLE    130-159 MG/DL BORDERLINE=INCREASED RISK OF ATHEROSCLEROTIC  CARDIOVASCULAR DISEASE    > OR = 160 MG/DL=ASSOCIATED WITH AN INCREASE RISK  OF  ATHEROSCLEROTIC CARDIOVASCULAR DISEASE     LDL Chol Calc ([facility])   Date Value Ref Range Status   04/07/2023 43 0 - 99 mg/dL Final     Hemoglobin A1C   Date Value Ref Range Status   04/07/2023 5.9 (H) 4.8 - 5.6 % Final     Comment:              Prediabetes: 5.7 - 6.4           Diabetes: >6.4           Glycemic control for adults with diabetes: <7.0     11/14/2022 6.10 (H) 4.80 - 5.60 % Final   10/15/2021 6.2 (H) 4.0 - 6.0 % Final     Comment:     HbA1c levels >6% are an indication of hyperglycemia during the preceding 2  to 3 months or longer.    HbA1c levels may reach 20% or higher in poorly controlled diabetes.  Therapeutic action is suggested at levels above 8%.    Diabetes patients with HbA1c levels below 7% meet the goal of the American  Diabetes Association.    HbA1c levels below the established reference range may indicate recent  episodes of hypoglycemia, the presence of Hb variants, or shortened lifetime  of erythrocytes.          Assessment / Plan     Assessment/Plan     Diagnoses and all orders for this visit:    1. Left acute otitis media (Primary)  -     cefdinir (OMNICEF) 300 MG capsule; Take 1 capsule by mouth 2 (Two) Times a Day for 7 days.  Dispense: 14 capsule; Refill: 0         An After Visit Summary was printed and given to the patient at discharge.  Return if symptoms worsen or fail to improve.    I have discussed the patient results/orders and plan/recommendation with them at today's visit.      Neetu Cummings, JAKY   04/17/2023

## 2023-05-18 ENCOUNTER — LAB (OUTPATIENT)
Dept: INTERNAL MEDICINE | Facility: CLINIC | Age: 58
End: 2023-05-18
Payer: MEDICAID

## 2023-05-18 ENCOUNTER — SPECIALTY PHARMACY (OUTPATIENT)
Dept: ENDOCRINOLOGY | Facility: CLINIC | Age: 58
End: 2023-05-18
Payer: MEDICAID

## 2023-05-18 DIAGNOSIS — E89.0 POSTOPERATIVE HYPOTHYROIDISM: ICD-10-CM

## 2023-05-18 NOTE — PROGRESS NOTES
Chart reviewed   Patient is doing well  No needs at this time  If fails ozempic we will try mounjaro

## 2023-05-26 ENCOUNTER — OFFICE VISIT (OUTPATIENT)
Dept: INTERNAL MEDICINE | Facility: CLINIC | Age: 58
End: 2023-05-26

## 2023-05-26 VITALS
OXYGEN SATURATION: 96 % | DIASTOLIC BLOOD PRESSURE: 75 MMHG | WEIGHT: 177 LBS | SYSTOLIC BLOOD PRESSURE: 116 MMHG | RESPIRATION RATE: 17 BRPM | HEIGHT: 62 IN | BODY MASS INDEX: 32.57 KG/M2 | HEART RATE: 84 BPM | TEMPERATURE: 98.1 F

## 2023-05-26 DIAGNOSIS — E11.9 CONTROLLED TYPE 2 DIABETES MELLITUS WITHOUT COMPLICATION, WITHOUT LONG-TERM CURRENT USE OF INSULIN: Primary | ICD-10-CM

## 2023-05-26 DIAGNOSIS — Z23 NEED FOR PNEUMOCOCCAL 20-VALENT CONJUGATE VACCINATION: ICD-10-CM

## 2023-05-26 NOTE — PROGRESS NOTES
Subjective     Chief Complaint   Patient presents with    Diabetes    Hypothyroidism       History of Present Illness  Ms. HILL WALTER is currently taking Ozempic for weight loss. Her blood sugar is well controlled. Her A1c was 5.9 percent in 04/2023. Her thyroid levels was checked last week. Her TSH was within normal limits. She feels great. She denies dizziness.    The patient is due for her pneumonia vaccination.    Her last eye exam was this year. She was told she had onset of cataracts. She denies being told that she has diabetes in her eyes.    Review of Systems   Otherwise complete ROS reviewed and negative except as mentioned in the HPI.    Past Medical History:   Past Medical History:   Diagnosis Date    Anxiety     Depression     Diabetes mellitus     Hyperlipidemia     Hypertension     Hyperthyroidism     Hypothyroidism 2010 2019 had right side removed    Obesity     Tremor     Vasovagal syncope      Past Surgical History:  Past Surgical History:   Procedure Laterality Date    CARPAL TUNNEL RELEASE Right 06/28/2022    Procedure: RIGHT CARPAL TUNNEL RELEASE;  Surgeon: Zeyad Gilbert MD;  Location: [facility] OR;  Service: Orthopedics;  Laterality: Right;    CARPAL TUNNEL RELEASE Left 8/2/2022    Procedure: LEFT CARPAL TUNNEL RELEASE;  Surgeon: Zeyad Gilbert MD;  Location: [facility] OR;  Service: Orthopedics;  Laterality: Left;    FOOT SURGERY Bilateral     HAMMERTOE AND BUNION REPAIR; 2017 & 2019    HYSTERECTOMY      THYROIDECTOMY, PARTIAL Right      Social History:  reports that she has never smoked. She has never used smokeless tobacco. She reports that she does not drink alcohol and does not use drugs.    Family History: family history includes Cancer in her sister; Diabetes in her brother, maternal grandmother, and sister; Heart disease in her father; Hypertension in her brother and sister; Pulmonary embolism in her mother; Thyroid disease in her mother; Ulcerative colitis in her  brother.       Allergies:  Allergies   Allergen Reactions    Metformin And Related Nausea And Vomiting    Codeine Rash    Morphine Rash    Propranolol Hcl GI Intolerance     Medications:  Prior to Admission medications    Medication Sig Start Date End Date Taking? Authorizing Provider   aspirin 81 MG EC tablet Take 1 tablet by mouth.    ProviderPaulina MD   atorvastatin (LIPITOR) 20 MG tablet Take 1 tablet by mouth once daily 10/26/22   Mary Matson APRN   Blood Glucose Monitoring Suppl (FreeStyle Lite) w/Device kit See Admin Instructions. for testing 11/8/22   ProviderPaulina MD   escitalopram (Lexapro) 20 MG tablet Take 1 tablet by mouth Daily. 10/13/22   Mary Matson APRN   glucose blood test strip To test bid 11/8/22   Mary Matson APRN   glucose monitor monitoring kit 1 each Continuous. 11/8/22   Mary Matson APRN   Lancets misc 1 each 2 (Two) Times a Day. 11/8/22   Mary Matson APRN   Levothyroxine Sodium (Tirosint) 200 MCG capsule Take 1 capsule by mouth daily.  Patient taking differently: Take 200 mcg by mouth Daily. Takes one every day except Saturday 12/13/22   Reza Spencer MD   losartan-hydrochlorothiazide (HYZAAR) 50-12.5 MG per tablet Take 1 tablet by mouth once daily 10/21/22   Mary Matson APRN   meclizine (ANTIVERT) 25 MG tablet Take 1 tablet by mouth 3 (Three) Times a Day As Needed for Dizziness. 7/18/22   Mary Matson APRN   metoprolol tartrate (LOPRESSOR) 25 MG tablet Take 1 tablet by mouth 2 (Two) Times a Day. 10/4/22   Mary Matson APRN   Semaglutide, 2 MG/DOSE, (Ozempic, 2 MG/DOSE,) 8 MG/3ML solution pen-injector Inject 2 mg under the skin into the appropriate area as directed 1 (One) Time Per Week. 4/13/23   Reza Spencer MD   traZODone (DESYREL) 50 MG tablet Take 1 tablet by mouth Every Night. 12/19/22   Mary Matson APRN       Objective     Vital  "Signs: /75   Pulse 84   Temp 98.1 °F (36.7 °C)   Resp 17   Ht 157.5 cm (62\")   Wt 80.3 kg (177 lb)   SpO2 96%   BMI 32.37 kg/m²   Physical Exam  Vitals reviewed.   Constitutional:       Appearance: She is well-developed. She is obese.   HENT:      Head: Normocephalic and atraumatic.   Eyes:      Pupils: Pupils are equal, round, and reactive to light.   Neck:      Vascular: No JVD.   Cardiovascular:      Rate and Rhythm: Normal rate and regular rhythm.   Pulmonary:      Effort: Pulmonary effort is normal.   Abdominal:      General: Bowel sounds are normal.      Palpations: Abdomen is soft.   Musculoskeletal:         General: No deformity.      Cervical back: Normal range of motion and neck supple.   Lymphadenopathy:      Cervical: No cervical adenopathy.   Skin:     General: Skin is warm and dry.   Neurological:      Mental Status: She is alert and oriented to person, place, and time.   Psychiatric:         Behavior: Behavior normal.         Thought Content: Thought content normal.         Judgment: Judgment normal.       BMI is >= 30 and <35. (Class 1 Obesity). The following options were offered after discussion;: exercise counseling/recommendations and nutrition counseling/recommendations      Results Reviewed:  Glucose   Date Value Ref Range Status   04/07/2023 108 (H) 70 - 99 mg/dL Final   11/14/2022 99 65 - 99 mg/dL Final   10/15/2021 121 (H) 74 - 109 mg/dL Final     BUN   Date Value Ref Range Status   04/07/2023 19 6 - 24 mg/dL Final   11/14/2022 9 6 - 20 mg/dL Final   10/15/2021 13 6 - 20 mg/dL Final     Creatinine   Date Value Ref Range Status   04/07/2023 0.98 0.57 - 1.00 mg/dL Final   11/14/2022 0.98 0.57 - 1.00 mg/dL Final   10/15/2021 0.8 0.5 - 0.9 mg/dL Final     Sodium   Date Value Ref Range Status   04/07/2023 139 134 - 144 mmol/L Final   11/14/2022 141 136 - 145 mmol/L Final   10/15/2021 138 136 - 145 mmol/L Final     Potassium   Date Value Ref Range Status   04/07/2023 4.4 3.5 - 5.2 " mmol/L Final   11/14/2022 3.9 3.5 - 5.2 mmol/L Final   10/15/2021 4.1 3.5 - 5.0 mmol/L Final     Chloride   Date Value Ref Range Status   04/07/2023 102 96 - 106 mmol/L Final   11/14/2022 102 98 - 107 mmol/L Final   10/15/2021 100 98 - 111 mmol/L Final     CO2   Date Value Ref Range Status   11/14/2022 32.0 (H) 22.0 - 29.0 mmol/L Final   10/15/2021 28 22 - 29 mmol/L Final     Total CO2   Date Value Ref Range Status   04/07/2023 25 20 - 29 mmol/L Final     Calcium   Date Value Ref Range Status   04/07/2023 9.4 8.7 - 10.2 mg/dL Final   11/14/2022 9.1 8.6 - 10.5 mg/dL Final   10/15/2021 9.3 8.6 - 10.0 mg/dL Final     ALT (SGPT)   Date Value Ref Range Status   04/07/2023 23 0 - 32 IU/L Final   11/14/2022 30 1 - 33 U/L Final   10/15/2021 31 5 - 33 U/L Final     AST (SGOT)   Date Value Ref Range Status   04/07/2023 19 0 - 40 IU/L Final   11/14/2022 28 1 - 32 U/L Final   10/15/2021 25 5 - 32 U/L Final     WBC   Date Value Ref Range Status   04/07/2023 5.8 3.4 - 10.8 x10E3/uL Final   10/15/2021 5.8 4.8 - 10.8 K/uL Final     Hematocrit   Date Value Ref Range Status   04/07/2023 40.4 34.0 - 46.6 % Final   11/14/2022 41.1 34.0 - 46.6 % Final   10/15/2021 43.2 37.0 - 47.0 % Final     Platelets   Date Value Ref Range Status   04/07/2023 280 150 - 450 x10E3/uL Final   11/14/2022 260 140 - 450 10*3/mm3 Final   10/15/2021 274 130 - 400 K/uL Final     Triglycerides   Date Value Ref Range Status   04/07/2023 61 0 - 149 mg/dL Final   10/15/2021 134 0 - 149 mg/dL Final     HDL Cholesterol   Date Value Ref Range Status   04/07/2023 34 (L) >39 mg/dL Final   10/15/2021 41 (L) 65 - 121 mg/dL Final     Comment:     VALUES>60 MG/DL ARE ASSOCIATED WITH A DECREASED RISK OF  ATHEROSCLEROTIC CARDIOVASCULAR DISEASE     LDL Cholesterol    Date Value Ref Range Status   10/15/2021 95 <100 mg/dL Final     Comment:     <100 MG/DL=OPITIMAL    100-129 MG/DL=DESIRABLE    130-159 MG/DL BORDERLINE=INCREASED RISK OF ATHEROSCLEROTIC  CARDIOVASCULAR  DISEASE    > OR = 160 MG/DL=ASSOCIATED WITH AN INCREASE RISK OF  ATHEROSCLEROTIC CARDIOVASCULAR DISEASE     LDL Chol Calc ([facility])   Date Value Ref Range Status   04/07/2023 43 0 - 99 mg/dL Final     Hemoglobin A1C   Date Value Ref Range Status   04/07/2023 5.9 (H) 4.8 - 5.6 % Final     Comment:              Prediabetes: 5.7 - 6.4           Diabetes: >6.4           Glycemic control for adults with diabetes: <7.0     11/14/2022 6.10 (H) 4.80 - 5.60 % Final   10/15/2021 6.2 (H) 4.0 - 6.0 % Final     Comment:     HbA1c levels >6% are an indication of hyperglycemia during the preceding 2  to 3 months or longer.    HbA1c levels may reach 20% or higher in poorly controlled diabetes.  Therapeutic action is suggested at levels above 8%.    Diabetes patients with HbA1c levels below 7% meet the goal of the American  Diabetes Association.    HbA1c levels below the established reference range may indicate recent  episodes of hypoglycemia, the presence of Hb variants, or shortened lifetime  of erythrocytes.          Assessment / Plan     Assessment/Plan:  Diagnoses and all orders for this visit:    1. Controlled type 2 diabetes mellitus without complication, without long-term current use of insulin (Primary)        - Much improved, follows with endocrinology.     2. Need for pneumococcal 20-valent conjugate vaccination  -     Pneumococcal Conjugate Vaccine 20-Valent (PCV20)    Return in about 6 months (around 11/26/2023). unless patient needs to be seen sooner or acute issues arise.    Code Status: full    I have discussed the patient results/orders and and plan/recommendation with them at today's visit.      JAKY Diaz   05/26/2023  Transcribed from ambient dictation for JAKY Diaz by Shirley Quezada.  05/26/23   15:42 CDT    Patient or patient representative verbalized consent to the visit recording.  I have personally performed the services described in this document as transcribed by the  above individual, and it is both accurate and complete.  Mary Matson, APRN  5/29/2023  16:51 CDT

## 2023-06-07 ENCOUNTER — SPECIALTY PHARMACY (OUTPATIENT)
Dept: ENDOCRINOLOGY | Facility: CLINIC | Age: 58
End: 2023-06-07
Payer: MEDICAID

## 2023-06-07 NOTE — PROGRESS NOTES
Specialty Pharmacy Refill Coordination Note     Heidi is a 57 y.o. female contacted today regarding refills of  tirosint specialty medication(s).    Reviewed and verified with patient:  Allergies       Specialty medication(s) and dose(s) confirmed: yes    Refill Questions      Flowsheet Row Most Recent Value   Changes to allergies? No   Changes to medications? No   New conditions since last clinic visit No   Unplanned office visit, urgent care, ED, or hospital admission in the last 4 weeks  No   How does patient/caregiver feel medication is working? Very good   Financial problems or insurance changes  No   Since the previous refill, were any specialty medication doses or scheduled injections missed or delayed?  No   Does this patient require a clinical escalation to a pharmacist? No            Delivery Questions      Flowsheet Row Most Recent Value   Delivery method [employer]   Delivery address correct? Yes   Delivery phone number [phone]   Number of medications in delivery 1   Medication being filled and delivered ozempic   Is there any medication that is due not being filled? No   Supplies needed? No supplies needed   Cooler needed? No   Do any medications need mixed or dated? No   Copay form of payment Credit card on file   Questions or concerns for the pharmacist? No   Are any medications first time fills? No          Mailing out on Monday         Follow-up: 3 month(s)     Shay L. Nancy  Specialty Pharmacy Technician

## 2023-06-15 RX ORDER — ESCITALOPRAM OXALATE 20 MG/1
TABLET ORAL
Qty: 90 TABLET | Refills: 0 | Status: SHIPPED | OUTPATIENT
Start: 2023-06-15

## 2023-06-15 NOTE — TELEPHONE ENCOUNTER
Rx Refill Note  Requested Prescriptions     Pending Prescriptions Disp Refills    escitalopram (LEXAPRO) 20 MG tablet [Pharmacy Med Name: Escitalopram Oxalate 20 MG Oral Tablet] 90 tablet 0     Sig: Take 1 tablet by mouth once daily      Last office visit with prescribing clinician: 5/26/2023   Last telemedicine visit with prescribing clinician: Visit date not found   Next office visit with prescribing clinician: 8/25/2023                         Would you like a call back once the refill request has been completed: [] Yes [] No    If the office needs to give you a call back, can they leave a voicemail: [] Yes [] No    Elena Wadsworth MA  06/15/23, 11:27 CDT

## 2023-08-23 DIAGNOSIS — I15.2 HYPERTENSION ASSOCIATED WITH DIABETES: ICD-10-CM

## 2023-08-23 DIAGNOSIS — E11.59 HYPERTENSION ASSOCIATED WITH DIABETES: ICD-10-CM

## 2023-08-23 DIAGNOSIS — E11.69 MIXED DIABETIC HYPERLIPIDEMIA ASSOCIATED WITH TYPE 2 DIABETES MELLITUS: ICD-10-CM

## 2023-08-23 DIAGNOSIS — E78.2 MIXED DIABETIC HYPERLIPIDEMIA ASSOCIATED WITH TYPE 2 DIABETES MELLITUS: ICD-10-CM

## 2023-08-23 DIAGNOSIS — E89.0 POSTOPERATIVE HYPOTHYROIDISM: Primary | ICD-10-CM

## 2023-08-25 ENCOUNTER — OFFICE VISIT (OUTPATIENT)
Dept: INTERNAL MEDICINE | Facility: CLINIC | Age: 58
End: 2023-08-25
Payer: MEDICAID

## 2023-08-25 VITALS
OXYGEN SATURATION: 98 % | TEMPERATURE: 97.9 F | HEIGHT: 62 IN | SYSTOLIC BLOOD PRESSURE: 133 MMHG | RESPIRATION RATE: 18 BRPM | WEIGHT: 174 LBS | BODY MASS INDEX: 32.02 KG/M2 | HEART RATE: 67 BPM | DIASTOLIC BLOOD PRESSURE: 83 MMHG

## 2023-08-25 DIAGNOSIS — Z00.00 ROUTINE GENERAL MEDICAL EXAMINATION AT A HEALTH CARE FACILITY: Primary | ICD-10-CM

## 2023-08-25 DIAGNOSIS — Z78.0 POST-MENOPAUSE: ICD-10-CM

## 2023-08-25 DIAGNOSIS — E11.9 ENCOUNTER FOR DIABETIC FOOT EXAM: ICD-10-CM

## 2023-08-25 DIAGNOSIS — R35.0 URINARY FREQUENCY: ICD-10-CM

## 2023-08-25 DIAGNOSIS — Z12.31 ENCOUNTER FOR SCREENING MAMMOGRAM FOR MALIGNANT NEOPLASM OF BREAST: ICD-10-CM

## 2023-08-25 LAB
BILIRUB BLD-MCNC: NEGATIVE MG/DL
CLARITY, POC: CLEAR
COLOR UR: YELLOW
GLUCOSE UR STRIP-MCNC: NEGATIVE MG/DL
KETONES UR QL: NEGATIVE
LEUKOCYTE EST, POC: NEGATIVE
NITRITE UR-MCNC: NEGATIVE MG/ML
PH UR: 7 [PH] (ref 5–8)
PROT UR STRIP-MCNC: NEGATIVE MG/DL
RBC # UR STRIP: NEGATIVE /UL
SP GR UR: 1.01 (ref 1–1.03)
UROBILINOGEN UR QL: NORMAL

## 2023-08-25 NOTE — PROGRESS NOTES
Subjective     Chief Complaint   Patient presents with    Annual Exam       History of Present Illness    Heidi Dhaliwal is a 57-year-old female who presents today for an annual exam.    Ms. Heidi Dhaliwal states her last eye exam was in 12/2022. She states she goes to [facility]. She states she does the pictures instead of the dilation that way they can watch and see if anything is going on. She states she will contact them for a copy of the x-rays.    The patient denies having a Dexcom or FreeStyle. She states she had a bone density test a long time ago. She states she had a hysterectomy.    The patient states her last colonoscopy was normal.    The patient states she has not had the COVID-19 vaccine in the last 30 days.    Patient's PMR from outside medical facility reviewed and noted.    Review of Systems     Otherwise complete ROS reviewed and negative except as mentioned in the HPI.    Past Medical History:   Past Medical History:   Diagnosis Date    Anxiety     Depression     Diabetes mellitus     Hyperlipidemia     Hypertension     Hyperthyroidism     Hypothyroidism 2010 2019 had right side removed    Obesity     Parkinson's disease     Pneumonia 1991, 2002, 2006    The are the years I had pneumonia    Tremor     Vasovagal syncope      Past Surgical History:  Past Surgical History:   Procedure Laterality Date    CARPAL TUNNEL RELEASE Right 06/28/2022    Procedure: RIGHT CARPAL TUNNEL RELEASE;  Surgeon: Zeyad Gilbert MD;  Location:  [facility] OR;  Service: Orthopedics;  Laterality: Right;    CARPAL TUNNEL RELEASE Left 8/2/2022    Procedure: LEFT CARPAL TUNNEL RELEASE;  Surgeon: Zeyad Gilbert MD;  Location:  [facility];  Service: Orthopedics;  Laterality: Left;    FOOT SURGERY Bilateral     HAMMERTOE AND BUNION REPAIR; 2017 & 2019    HYSTERECTOMY      THYROIDECTOMY, PARTIAL Right      Social History:  reports that she has never smoked. She has never used smokeless tobacco. She reports  that she does not drink alcohol and does not use drugs.    Family History: family history includes Cancer in her sister; Diabetes in her brother, maternal grandmother, and sister; Heart disease in her father; Hypertension in her brother and sister; Pulmonary embolism in her mother; Thyroid disease in her mother; Ulcerative colitis in her brother.       Allergies:  Allergies   Allergen Reactions    Metformin And Related Nausea And Vomiting    Codeine Rash    Morphine Rash    Propranolol Hcl GI Intolerance     Medications:  Prior to Admission medications    Medication Sig Start Date End Date Taking? Authorizing Provider   aspirin 81 MG EC tablet Take 1 tablet by mouth.    ProviderPaulina MD   atorvastatin (LIPITOR) 20 MG tablet Take 1 tablet by mouth once daily 10/26/22   Mary Matson APRN   Blood Glucose Monitoring Suppl (FreeStyle Lite) w/Device kit See Admin Instructions. for testing 11/8/22   ProviderPaulina MD   escitalopram (LEXAPRO) 20 MG tablet Take 1 tablet by mouth once daily 6/15/23   Mary Matson APRN   glucose blood test strip To test bid 11/8/22   Mary Matson APRN   glucose monitor monitoring kit 1 each Continuous. 11/8/22   Mary Matson APRN   Lancets misc 1 each 2 (Two) Times a Day. 11/8/22   Mary Matson APRN   Levothyroxine Sodium (Tirosint) 200 MCG capsule Take 1 capsule by mouth daily.  Patient taking differently: Take 200 mcg by mouth Daily. Takes one every day except Saturday 12/13/22   Reza Spencer MD   losartan-hydrochlorothiazide (HYZAAR) 50-12.5 MG per tablet Take 1 tablet by mouth once daily 10/21/22   Mary Matson APRN   meclizine (ANTIVERT) 25 MG tablet Take 1 tablet by mouth 3 (Three) Times a Day As Needed for Dizziness. 7/18/22   Mary Matson APRN   metoprolol tartrate (LOPRESSOR) 25 MG tablet Take 1 tablet by mouth 2 (Two) Times a Day. 10/4/22   Mary Matson APRN  "  Semaglutide, 2 MG/DOSE, (Ozempic, 2 MG/DOSE,) 8 MG/3ML solution pen-injector Inject 2 mg under the skin into the appropriate area as directed 1 (One) Time Per Week. 4/13/23   Reza Spencer MD   traZODone (DESYREL) 50 MG tablet Take 1 tablet by mouth Every Night. 12/19/22   Mary Matson APRN       Objective     Vital Signs: /83   Pulse 67   Temp 97.9 øF (36.6 øC)   Resp 18   Ht 157.5 cm (62\")   Wt 78.9 kg (174 lb)   SpO2 98%   BMI 31.83 kg/mý   Physical Exam  Vitals reviewed.   Constitutional:       Appearance: She is well-developed. She is obese.   HENT:      Head: Normocephalic and atraumatic.   Eyes:      Pupils: Pupils are equal, round, and reactive to light.   Neck:      Vascular: No JVD.   Cardiovascular:      Rate and Rhythm: Normal rate and regular rhythm.      Pulses:           Dorsalis pedis pulses are 2+ on the right side and 2+ on the left side.        Posterior tibial pulses are 2+ on the right side and 2+ on the left side.   Pulmonary:      Effort: Pulmonary effort is normal.   Abdominal:      General: Bowel sounds are normal.      Palpations: Abdomen is soft.   Musculoskeletal:         General: No deformity.      Cervical back: Normal range of motion and neck supple.        Feet:    Feet:      Right foot:      Protective Sensation: 10 sites tested.  10 sites sensed.      Skin integrity: Callus present.      Toenail Condition: Right toenails are normal.      Left foot:      Protective Sensation: 10 sites tested.  10 sites sensed.      Skin integrity: Callus present.      Toenail Condition: Left toenails are normal.      Comments: callus  Lymphadenopathy:      Cervical: No cervical adenopathy.   Skin:     General: Skin is warm and dry.   Neurological:      Mental Status: She is alert and oriented to person, place, and time.   Psychiatric:         Behavior: Behavior normal.         Thought Content: Thought content normal.         Judgment: Judgment normal. "     Results Reviewed:  Glucose   Date Value Ref Range Status   07/14/2023 93 70 - 99 mg/dL Final   11/14/2022 99 65 - 99 mg/dL Final   10/15/2021 121 (H) 74 - 109 mg/dL Final     BUN   Date Value Ref Range Status   07/14/2023 10 6 - 24 mg/dL Final   11/14/2022 9 6 - 20 mg/dL Final   10/15/2021 13 6 - 20 mg/dL Final     Creatinine   Date Value Ref Range Status   07/14/2023 0.93 0.57 - 1.00 mg/dL Final   11/14/2022 0.98 0.57 - 1.00 mg/dL Final   10/15/2021 0.8 0.5 - 0.9 mg/dL Final     Sodium   Date Value Ref Range Status   07/14/2023 140 134 - 144 mmol/L Final   11/14/2022 141 136 - 145 mmol/L Final   10/15/2021 138 136 - 145 mmol/L Final     Potassium   Date Value Ref Range Status   07/14/2023 4.5 3.5 - 5.2 mmol/L Final   11/14/2022 3.9 3.5 - 5.2 mmol/L Final   10/15/2021 4.1 3.5 - 5.0 mmol/L Final     Chloride   Date Value Ref Range Status   07/14/2023 104 96 - 106 mmol/L Final   11/14/2022 102 98 - 107 mmol/L Final   10/15/2021 100 98 - 111 mmol/L Final     CO2   Date Value Ref Range Status   11/14/2022 32.0 (H) 22.0 - 29.0 mmol/L Final   10/15/2021 28 22 - 29 mmol/L Final     Total CO2   Date Value Ref Range Status   07/14/2023 24 20 - 29 mmol/L Final     Calcium   Date Value Ref Range Status   07/14/2023 9.1 8.7 - 10.2 mg/dL Final   11/14/2022 9.1 8.6 - 10.5 mg/dL Final   10/15/2021 9.3 8.6 - 10.0 mg/dL Final     ALT (SGPT)   Date Value Ref Range Status   07/14/2023 22 0 - 32 IU/L Final   11/14/2022 30 1 - 33 U/L Final   10/15/2021 31 5 - 33 U/L Final     AST (SGOT)   Date Value Ref Range Status   07/14/2023 20 0 - 40 IU/L Final   11/14/2022 28 1 - 32 U/L Final   10/15/2021 25 5 - 32 U/L Final     WBC   Date Value Ref Range Status   07/14/2023 5.9 3.4 - 10.8 x10E3/uL Final   10/15/2021 5.8 4.8 - 10.8 K/uL Final     Hematocrit   Date Value Ref Range Status   07/14/2023 41.9 34.0 - 46.6 % Final   11/14/2022 41.1 34.0 - 46.6 % Final   10/15/2021 43.2 37.0 - 47.0 % Final     Platelets   Date Value Ref Range Status    07/14/2023 258 150 - 450 x10E3/uL Final   11/14/2022 260 140 - 450 10*3/mm3 Final   10/15/2021 274 130 - 400 K/uL Final     Triglycerides   Date Value Ref Range Status   04/07/2023 61 0 - 149 mg/dL Final   10/15/2021 134 0 - 149 mg/dL Final     HDL Cholesterol   Date Value Ref Range Status   04/07/2023 34 (L) >39 mg/dL Final   10/15/2021 41 (L) 65 - 121 mg/dL Final     Comment:     VALUES>60 MG/DL ARE ASSOCIATED WITH A DECREASED RISK OF  ATHEROSCLEROTIC CARDIOVASCULAR DISEASE     LDL Cholesterol    Date Value Ref Range Status   10/15/2021 95 <100 mg/dL Final     Comment:     <100 MG/DL=OPITIMAL    100-129 MG/DL=DESIRABLE    130-159 MG/DL BORDERLINE=INCREASED RISK OF ATHEROSCLEROTIC  CARDIOVASCULAR DISEASE    > OR = 160 MG/DL=ASSOCIATED WITH AN INCREASE RISK OF  ATHEROSCLEROTIC CARDIOVASCULAR DISEASE     LDL Chol Calc (NIH)   Date Value Ref Range Status   04/07/2023 43 0 - 99 mg/dL Final     Hemoglobin A1C   Date Value Ref Range Status   04/07/2023 5.9 (H) 4.8 - 5.6 % Final     Comment:              Prediabetes: 5.7 - 6.4           Diabetes: >6.4           Glycemic control for adults with diabetes: <7.0     11/14/2022 6.10 (H) 4.80 - 5.60 % Final   10/15/2021 6.2 (H) 4.0 - 6.0 % Final     Comment:     HbA1c levels >6% are an indication of hyperglycemia during the preceding 2  to 3 months or longer.    HbA1c levels may reach 20% or higher in poorly controlled diabetes.  Therapeutic action is suggested at levels above 8%.    Diabetes patients with HbA1c levels below 7% meet the goal of the American  Diabetes Association.    HbA1c levels below the established reference range may indicate recent  episodes of hypoglycemia, the presence of Hb variants, or shortened lifetime  of erythrocytes.          Assessment / Plan     Assessment/Plan:  Diagnoses and all orders for this visit:    1. Routine general medical examination at a health care facility (Primary)    2. Encounter for screening mammogram for malignant neoplasm of  breast  -     Mammo Screening Digital Tomosynthesis Bilateral With CAD; Future    3. Post-menopause  -     DEXA Bone Density Axial; Future    4. Encounter for diabetic foot exam    5. Urinary frequency  -     POCT urinalysis dipstick, multipro      Will have lab work here today. Encouraged SBE, pt is aware how to do self breast exam and the importance of same. Discussed weight management and importance of maintaining a healthy weight. Discussed Vitamin D intake and the importance of adequate vitamin D for both Bone Health and a healthy immune system.  Discussed Daily exercise and the importance of same, in regards to a healthy heart as well as helping to maintain her weight and improving her mental health.  BMI is elevated. Colonoscopy is up to date.  Mammogram will be scheduled at [facility]    No follow-ups on file. unless patient needs to be seen sooner or acute issues arise.    Code Status: Full.     I have discussed the patient results/orders and and plan/recommendation with them at today's visit.      Transcribed from ambient dictation for JAKY Diaz by Porsha Robertson.  08/25/23   12:06 CDT    Patient or patient representative verbalized consent to the visit recording.  I have personally performed the services described in this document as transcribed by the above individual, and it is both accurate and complete.  JAKY Diaz  8/25/2023  12:59 CDT

## 2023-08-26 DIAGNOSIS — R55 VASOVAGAL SYNCOPE: ICD-10-CM

## 2023-08-26 DIAGNOSIS — I10 ESSENTIAL HYPERTENSION: ICD-10-CM

## 2023-08-26 DIAGNOSIS — E78.5 DYSLIPIDEMIA (HIGH LDL; LOW HDL): ICD-10-CM

## 2023-08-28 RX ORDER — ATORVASTATIN CALCIUM 20 MG/1
TABLET, FILM COATED ORAL
Qty: 90 TABLET | Refills: 0 | Status: SHIPPED | OUTPATIENT
Start: 2023-08-28

## 2023-08-28 RX ORDER — LOSARTAN POTASSIUM AND HYDROCHLOROTHIAZIDE 12.5; 5 MG/1; MG/1
TABLET ORAL
Qty: 90 TABLET | Refills: 0 | Status: SHIPPED | OUTPATIENT
Start: 2023-08-28

## 2023-08-28 NOTE — TELEPHONE ENCOUNTER
Rx Refill Note  Requested Prescriptions     Pending Prescriptions Disp Refills    metoprolol tartrate (LOPRESSOR) 25 MG tablet [Pharmacy Med Name: Metoprolol Tartrate 25 MG Oral Tablet] 60 tablet 0     Sig: Take 1 tablet by mouth twice daily    atorvastatin (LIPITOR) 20 MG tablet [Pharmacy Med Name: Atorvastatin Calcium 20 MG Oral Tablet] 90 tablet 0     Sig: Take 1 tablet by mouth once daily    losartan-hydrochlorothiazide (HYZAAR) 50-12.5 MG per tablet [Pharmacy Med Name: Losartan Potassium-HCTZ 50-12.5 MG Oral Tablet] 90 tablet 0     Sig: Take 1 tablet by mouth once daily      Last office visit with prescribing clinician: 8/25/2023   Next office visit with prescribing clinician: 10/20/2023                         Would you like a call back once the refill request has been completed: [] Yes [] No    If the office needs to give you a call back, can they leave a voicemail: [] Yes [] No    Rubina Cash RN  08/28/23, 08:22 CDT

## 2023-09-08 RX ORDER — ESCITALOPRAM OXALATE 20 MG/1
TABLET ORAL
Qty: 90 TABLET | Refills: 0 | Status: SHIPPED | OUTPATIENT
Start: 2023-09-08

## 2023-09-08 NOTE — TELEPHONE ENCOUNTER
Rx Refill Note  Requested Prescriptions     Pending Prescriptions Disp Refills    escitalopram (LEXAPRO) 20 MG tablet [Pharmacy Med Name: Escitalopram Oxalate 20 MG Oral Tablet] 90 tablet 0     Sig: Take 1 tablet by mouth once daily      Last office visit with prescribing clinician: 8/25/2023   Next office visit with prescribing clinician: 10/20/2023                         Would you like a call back once the refill request has been completed: [] Yes [] No    If the office needs to give you a call back, can they leave a voicemail: [] Yes [] No    Rubina Cash RN  09/08/23, 10:04 CDT

## 2023-09-18 ENCOUNTER — SPECIALTY PHARMACY (OUTPATIENT)
Dept: ENDOCRINOLOGY | Facility: CLINIC | Age: 58
End: 2023-09-18
Payer: MEDICAID

## 2023-09-18 DIAGNOSIS — I10 ESSENTIAL HYPERTENSION: ICD-10-CM

## 2023-09-18 DIAGNOSIS — R55 VASOVAGAL SYNCOPE: ICD-10-CM

## 2023-09-18 NOTE — TELEPHONE ENCOUNTER
Rx Refill Note  Requested Prescriptions     Pending Prescriptions Disp Refills    metoprolol tartrate (LOPRESSOR) 25 MG tablet [Pharmacy Med Name: Metoprolol Tartrate 25 MG Oral Tablet] 60 tablet 0     Sig: Take 1 tablet by mouth twice daily      Last office visit with prescribing clinician: 8/25/2023   Next office visit with prescribing clinician: 10/20/2023                         Would you like a call back once the refill request has been completed: [] Yes [] No    If the office needs to give you a call back, can they leave a voicemail: [] Yes [] No    Rubina Cash RN  09/18/23, 10:10 CDT

## 2023-09-18 NOTE — PROGRESS NOTES
Specialty Pharmacy Refill Coordination Note     Heidi is a 57 y.o. female contacted today regarding refills of  specialty medication(s).      Specialty medication(s) and dose(s) confirmed: yes    Refill Questions      Flowsheet Row Most Recent Value   Changes to allergies? No   Changes to medications? No   New conditions since last clinic visit No   Unplanned office visit, urgent care, ED, or hospital admission in the last 4 weeks  No   How does patient/caregiver feel medication is working? Very good   Financial problems or insurance changes  No   Since the previous refill, were any specialty medication doses or scheduled injections missed or delayed?  No   Does this patient require a clinical escalation to a pharmacist? No            Delivery Questions      Flowsheet Row Most Recent Value   Delivery method [employer]   Delivery address correct? Yes   Number of medications in delivery 1   Medication being filled and delivered ozempic and tirosint   Is there any medication that is due not being filled? No   Cooler needed? No   Do any medications need mixed or dated? No   Questions or concerns for the pharmacist? No   Are any medications first time fills? No                   Follow-up: 3 months.      Shay Cruz  Specialty Pharmacy Technician

## 2023-09-20 ENCOUNTER — LAB (OUTPATIENT)
Dept: INTERNAL MEDICINE | Facility: CLINIC | Age: 58
End: 2023-09-20
Payer: MEDICAID

## 2023-09-20 DIAGNOSIS — E78.2 MIXED DIABETIC HYPERLIPIDEMIA ASSOCIATED WITH TYPE 2 DIABETES MELLITUS: ICD-10-CM

## 2023-09-20 DIAGNOSIS — I15.2 HYPERTENSION ASSOCIATED WITH DIABETES: ICD-10-CM

## 2023-09-20 DIAGNOSIS — E89.0 POSTOPERATIVE HYPOTHYROIDISM: ICD-10-CM

## 2023-09-20 DIAGNOSIS — E11.69 MIXED DIABETIC HYPERLIPIDEMIA ASSOCIATED WITH TYPE 2 DIABETES MELLITUS: ICD-10-CM

## 2023-09-20 DIAGNOSIS — E11.59 HYPERTENSION ASSOCIATED WITH DIABETES: ICD-10-CM

## 2023-09-23 LAB
ALBUMIN SERPL-MCNC: 4.2 G/DL (ref 3.8–4.9)
ALBUMIN/CREAT UR: <3 MG/G CREAT (ref 0–29)
ALBUMIN/GLOB SERPL: 1.6 {RATIO} (ref 1.2–2.2)
ALP SERPL-CCNC: 53 IU/L (ref 44–121)
ALT SERPL-CCNC: 20 IU/L (ref 0–32)
AST SERPL-CCNC: 21 IU/L (ref 0–40)
BASOPHILS # BLD AUTO: 0.1 X10E3/UL (ref 0–0.2)
BASOPHILS NFR BLD AUTO: 1 %
BILIRUB SERPL-MCNC: <0.2 MG/DL (ref 0–1.2)
BUN SERPL-MCNC: 14 MG/DL (ref 6–24)
BUN/CREAT SERPL: 14 (ref 9–23)
CALCIUM SERPL-MCNC: 9.1 MG/DL (ref 8.7–10.2)
CHLORIDE SERPL-SCNC: 101 MMOL/L (ref 96–106)
CHOLEST SERPL-MCNC: 133 MG/DL (ref 100–199)
CO2 SERPL-SCNC: 25 MMOL/L (ref 20–29)
CREAT SERPL-MCNC: 1.02 MG/DL (ref 0.57–1)
CREAT UR-MCNC: 113.7 MG/DL
EGFRCR SERPLBLD CKD-EPI 2021: 63 ML/MIN/1.73
EOSINOPHIL # BLD AUTO: 0.2 X10E3/UL (ref 0–0.4)
EOSINOPHIL NFR BLD AUTO: 4 %
ERYTHROCYTE [DISTWIDTH] IN BLOOD BY AUTOMATED COUNT: 13.1 % (ref 11.7–15.4)
EST. AVERAGE GLUCOSE BLD GHB EST-MCNC: 120 MG/DL
GLOBULIN SER CALC-MCNC: 2.6 G/DL (ref 1.5–4.5)
GLUCOSE SERPL-MCNC: 95 MG/DL (ref 70–99)
HBA1C MFR BLD: 5.8 %HB
HCT VFR BLD AUTO: 40.4 % (ref 34–46.6)
HDLC SERPL-MCNC: 45 MG/DL
HGB BLD-MCNC: 13.1 G/DL (ref 11.1–15.9)
IMM GRANULOCYTES # BLD AUTO: 0 X10E3/UL (ref 0–0.1)
IMM GRANULOCYTES NFR BLD AUTO: 0 %
LDLC SERPL CALC-MCNC: 75 MG/DL (ref 0–99)
LYMPHOCYTES # BLD AUTO: 2.2 X10E3/UL (ref 0.7–3.1)
LYMPHOCYTES NFR BLD AUTO: 42 %
MCH RBC QN AUTO: 26.5 PG (ref 26.6–33)
MCHC RBC AUTO-ENTMCNC: 32.4 G/DL (ref 31.5–35.7)
MCV RBC AUTO: 82 FL (ref 79–97)
MICROALBUMIN UR-MCNC: <3 UG/ML
MONOCYTES # BLD AUTO: 0.4 X10E3/UL (ref 0.1–0.9)
MONOCYTES NFR BLD AUTO: 7 %
NEUTROPHILS # BLD AUTO: 2.4 X10E3/UL (ref 1.4–7)
NEUTROPHILS NFR BLD AUTO: 46 %
PLATELET # BLD AUTO: 270 X10E3/UL (ref 150–450)
POTASSIUM SERPL-SCNC: 4.1 MMOL/L (ref 3.5–5.2)
PROT SERPL-MCNC: 6.8 G/DL (ref 6–8.5)
RBC # BLD AUTO: 4.94 X10E6/UL (ref 3.77–5.28)
SODIUM SERPL-SCNC: 141 MMOL/L (ref 134–144)
T4 SERPL-MCNC: 8.9 UG/DL (ref 4.5–12)
TRIGL SERPL-MCNC: 60 MG/DL (ref 0–149)
TSH SERPL DL<=0.005 MIU/L-ACNC: 4.97 UIU/ML (ref 0.45–4.5)
VIT B12 SERPL-MCNC: 454 PG/ML (ref 232–1245)
VLDLC SERPL CALC-MCNC: 13 MG/DL (ref 5–40)
WBC # BLD AUTO: 5.3 X10E3/UL (ref 3.4–10.8)

## 2023-09-28 ENCOUNTER — OFFICE VISIT (OUTPATIENT)
Dept: ENDOCRINOLOGY | Facility: CLINIC | Age: 58
End: 2023-09-28
Payer: MEDICAID

## 2023-09-28 VITALS
SYSTOLIC BLOOD PRESSURE: 110 MMHG | DIASTOLIC BLOOD PRESSURE: 78 MMHG | BODY MASS INDEX: 30.55 KG/M2 | WEIGHT: 166 LBS | HEART RATE: 71 BPM | OXYGEN SATURATION: 99 % | HEIGHT: 62 IN

## 2023-09-28 DIAGNOSIS — I15.2 HYPERTENSION ASSOCIATED WITH DIABETES: ICD-10-CM

## 2023-09-28 DIAGNOSIS — E89.0 POSTOPERATIVE HYPOTHYROIDISM: Primary | ICD-10-CM

## 2023-09-28 DIAGNOSIS — E78.2 MIXED DIABETIC HYPERLIPIDEMIA ASSOCIATED WITH TYPE 2 DIABETES MELLITUS: ICD-10-CM

## 2023-09-28 DIAGNOSIS — E11.69 MIXED DIABETIC HYPERLIPIDEMIA ASSOCIATED WITH TYPE 2 DIABETES MELLITUS: ICD-10-CM

## 2023-09-28 DIAGNOSIS — E11.59 HYPERTENSION ASSOCIATED WITH DIABETES: ICD-10-CM

## 2023-09-28 DIAGNOSIS — E11.9 CONTROLLED TYPE 2 DIABETES MELLITUS WITHOUT COMPLICATION, WITHOUT LONG-TERM CURRENT USE OF INSULIN: ICD-10-CM

## 2023-09-28 PROCEDURE — 3074F SYST BP LT 130 MM HG: CPT | Performed by: INTERNAL MEDICINE

## 2023-09-28 PROCEDURE — 99214 OFFICE O/P EST MOD 30 MIN: CPT | Performed by: INTERNAL MEDICINE

## 2023-09-28 PROCEDURE — 3044F HG A1C LEVEL LT 7.0%: CPT | Performed by: INTERNAL MEDICINE

## 2023-09-28 PROCEDURE — 3078F DIAST BP <80 MM HG: CPT | Performed by: INTERNAL MEDICINE

## 2023-09-28 RX ORDER — LEVOTHYROXINE SODIUM 13 UG/1
150 CAPSULE ORAL DAILY
Qty: 30 CAPSULE | Refills: 11 | Status: SHIPPED | OUTPATIENT
Start: 2023-09-28

## 2023-09-28 NOTE — PROGRESS NOTES
"Chief Complaint   Patient presents with    Hypothyroidism         History of Present Illness    57 y.o. female   2019 right thyroidectomy for obstructive goiter  On tirosint 200mcg vials  w better absorption than levothyroxine   She is compliant   She is realiable    Variable TSH readings.   Now TSH suppressed on 6 vials per week     --    Type 2 DM  No CAD , no Stroke  No microvascular disease     Main symptoms, fatigue, weight gain   Now 20 lbs weight loss       ==========================================  Physical Exam  /78   Pulse 71   Ht 157.5 cm (62\")   Wt 75.3 kg (166 lb)   SpO2 99%   BMI 30.36 kg/m²   AOx3  No Goiter , no carotid bruit  RRR  CTA  No Edema     ==========================================    Laboratory Workup    Lab Results   Component Value Date    WBC 5.3 09/20/2023    HGB 13.1 09/20/2023    HCT 40.4 09/20/2023    MCV 82 09/20/2023     09/20/2023       Lab Results   Component Value Date    GLUCOSE 95 09/20/2023    BUN 14 09/20/2023    CREATININE 1.02 (H) 09/20/2023    EGFR 66.6 11/14/2022    EGFRIFNONA >60 10/15/2021    EGFRIFAFRI >59 10/15/2021    BCR 14 09/20/2023     09/20/2023    K 4.1 09/20/2023     09/20/2023    CO2 25 09/20/2023    CALCIUM 9.1 09/20/2023    ALBUMIN 4.2 09/20/2023    GLOB 3.1 11/14/2022    BILITOT <0.2 09/20/2023    ALKPHOS 53 09/20/2023    AST 21 09/20/2023    ALT 20 09/20/2023    AGRATIO 1.3 11/14/2022       ==========================================      ICD-10-CM ICD-9-CM   1. Postoperative hypothyroidism  E89.0 244.0   2. Hypertension associated with diabetes  E11.59 250.80    I15.2 401.9   3. Mixed diabetic hyperlipidemia associated with type 2 diabetes mellitus  E11.69 250.80    E78.2 272.2   4. Controlled type 2 diabetes mellitus without complication, without long-term current use of insulin  E11.9 250.00   -    --    Hypothyroidism     Lab Results   Component Value Date    TSH 4.970 (H) 09/20/2023       Post operative    tirosint 200 " "daily - change to 6  Change to 5  Change to tirosint 150 mcgs every day   ---    Type 2 DM  Intolerant to metformin   Ozempic 2 mg          --    HTN   /78   Pulse 71   Ht 157.5 cm (62\")   Wt 75.3 kg (166 lb)   SpO2 99%   BMI 30.36 kg/m²   Controlled    Lipids  Lab Results   Component Value Date    CHLPL 133 09/20/2023    TRIG 60 09/20/2023    HDL 45 09/20/2023    LDL 75 09/20/2023     On statin        No orders of the defined types were placed in this encounter.      No orders of the defined types were placed in this encounter.            This document has been electronically signed by Reza Saez MD on September 28, 2023 08:13 CDT          "

## 2023-10-13 DIAGNOSIS — R05.3 POST-COVID CHRONIC COUGH: Primary | ICD-10-CM

## 2023-10-13 DIAGNOSIS — U09.9 POST-COVID CHRONIC COUGH: Primary | ICD-10-CM

## 2023-10-13 RX ORDER — HYDROCODONE POLISTIREX AND CHLORPHENIRAMINE POLISTIREX 10; 8 MG/5ML; MG/5ML
5 SUSPENSION, EXTENDED RELEASE ORAL EVERY 12 HOURS PRN
Qty: 60 ML | Refills: 0 | Status: SHIPPED | OUTPATIENT
Start: 2023-10-13

## 2023-10-18 DIAGNOSIS — E11.69 MIXED DIABETIC HYPERLIPIDEMIA ASSOCIATED WITH TYPE 2 DIABETES MELLITUS: ICD-10-CM

## 2023-10-18 DIAGNOSIS — Z78.0 POST-MENOPAUSE: ICD-10-CM

## 2023-10-18 DIAGNOSIS — E78.2 MIXED DIABETIC HYPERLIPIDEMIA ASSOCIATED WITH TYPE 2 DIABETES MELLITUS: ICD-10-CM

## 2023-10-18 DIAGNOSIS — E11.9 CONTROLLED TYPE 2 DIABETES MELLITUS WITHOUT COMPLICATION, WITHOUT LONG-TERM CURRENT USE OF INSULIN: ICD-10-CM

## 2023-10-18 DIAGNOSIS — E11.59 HYPERTENSION ASSOCIATED WITH DIABETES: ICD-10-CM

## 2023-10-18 DIAGNOSIS — E89.0 POSTOPERATIVE HYPOTHYROIDISM: ICD-10-CM

## 2023-10-18 DIAGNOSIS — I15.2 HYPERTENSION ASSOCIATED WITH DIABETES: ICD-10-CM

## 2023-10-19 DIAGNOSIS — Z12.31 ENCOUNTER FOR SCREENING MAMMOGRAM FOR MALIGNANT NEOPLASM OF BREAST: ICD-10-CM

## 2023-10-20 ENCOUNTER — OFFICE VISIT (OUTPATIENT)
Dept: INTERNAL MEDICINE | Facility: CLINIC | Age: 58
End: 2023-10-20
Payer: MEDICAID

## 2023-10-20 VITALS
BODY MASS INDEX: 30.95 KG/M2 | TEMPERATURE: 97.3 F | WEIGHT: 168.2 LBS | HEIGHT: 62 IN | OXYGEN SATURATION: 99 % | DIASTOLIC BLOOD PRESSURE: 85 MMHG | SYSTOLIC BLOOD PRESSURE: 128 MMHG | RESPIRATION RATE: 18 BRPM | HEART RATE: 73 BPM

## 2023-10-20 DIAGNOSIS — Z23 NEED FOR INFLUENZA VACCINATION: ICD-10-CM

## 2023-10-20 DIAGNOSIS — E11.59 HYPERTENSION ASSOCIATED WITH DIABETES: Primary | ICD-10-CM

## 2023-10-20 DIAGNOSIS — M85.88 OSTEOPENIA OF LUMBAR SPINE: ICD-10-CM

## 2023-10-20 DIAGNOSIS — I15.2 HYPERTENSION ASSOCIATED WITH DIABETES: Primary | ICD-10-CM

## 2023-10-20 RX ORDER — RISEDRONATE SODIUM 5 MG/1
5 TABLET, FILM COATED ORAL
Qty: 30 TABLET | Refills: 1 | Status: SHIPPED | OUTPATIENT
Start: 2023-10-20

## 2023-10-20 RX ORDER — LEVOTHYROXINE SODIUM 13 UG/1
1 CAPSULE ORAL DAILY
COMMUNITY
Start: 2023-09-28 | End: 2024-09-28

## 2023-10-20 NOTE — PROGRESS NOTES
Subjective     Chief Complaint   Patient presents with    Diabetes     3 month follow up.        History of Present Illness  Heidi Dhaliwal presents today for a follow-up visit.    Ms. Dhaliwal has normal blood pressure and has lost 6 pounds since 08/2023. Her hemoglobin A1c is 5.8 percent. Her mammogram was normal. She completed laboratory studies with Dr. Saez. Her TSH went from 0.005-4.9 uIU/mL. She reports taking 200 MCG of thyroid medication for 5 days and then began 150 MCG for 7 days. She completed a vision exam at [facility] in 03/2023. The patient does not have retinopathy.    She recently had COVID-19 and experienced difficulty with her symptoms. She was prescribed Paxlovid. Additionally, she had an ongoing cough and was prescribed a cough syrup, which her insurance would not cover. The patient has contracted COVID-19 three times, and her first infection was the worst. However, her latest COVID-19 infection was worse than the 2nd. She reports fatigue and weakness. Patient declines COVID-19 vaccine. She requests an influenza vaccine.  Patient's PMR from outside medical facility reviewed and noted.    Review of Systems   Constitutional:  Negative for activity change, appetite change, chills and fever.   HENT:  Negative for hearing loss, nosebleeds, tinnitus and trouble swallowing.    Eyes:  Negative for visual disturbance.   Respiratory:  Negative for cough, chest tightness, shortness of breath and wheezing.    Cardiovascular:  Negative for chest pain, palpitations and leg swelling.   Gastrointestinal:  Negative for abdominal distention, abdominal pain, blood in stool, constipation, diarrhea, nausea and vomiting.   Endocrine: Negative for cold intolerance, heat intolerance, polydipsia, polyphagia and polyuria.   Genitourinary:  Negative for decreased urine volume, difficulty urinating, dysuria, flank pain, frequency and hematuria.   Musculoskeletal:  Negative for arthralgias, joint swelling and  myalgias.   Skin:  Negative for rash.   Allergic/Immunologic: Negative for immunocompromised state.   Neurological:  Negative for dizziness, syncope, weakness, light-headedness and headaches.   Hematological:  Negative for adenopathy. Does not bruise/bleed easily.   Psychiatric/Behavioral:  Negative for confusion and sleep disturbance. The patient is not nervous/anxious.       Otherwise complete ROS reviewed and negative except as mentioned in the HPI.    Past Medical History:   Past Medical History:   Diagnosis Date    Anxiety     Depression     Diabetes mellitus     Hyperlipidemia     Hypertension     Hyperthyroidism     Hypothyroidism 2010 2019 had right side removed    Obesity     Parkinson's disease     Pneumonia 1991, 2002, 2006    The are the years I had pneumonia    Tremor     Vasovagal syncope      Past Surgical History:  Past Surgical History:   Procedure Laterality Date    CARPAL TUNNEL RELEASE Right 06/28/2022    Procedure: RIGHT CARPAL TUNNEL RELEASE;  Surgeon: Zeyad Gilbert MD;  Location: [facility] OR;  Service: Orthopedics;  Laterality: Right;    CARPAL TUNNEL RELEASE Left 8/2/2022    Procedure: LEFT CARPAL TUNNEL RELEASE;  Surgeon: Zeyad Gilbert MD;  Location: [facility] OR;  Service: Orthopedics;  Laterality: Left;    FOOT SURGERY Bilateral     HAMMERTOE AND BUNION REPAIR; 2017 & 2019    HYSTERECTOMY      THYROIDECTOMY, PARTIAL Right      Social History:  reports that she has never smoked. She has never used smokeless tobacco. She reports that she does not drink alcohol and does not use drugs.    Family History: family history includes Cancer in her sister; Diabetes in her brother, maternal grandmother, and sister; Heart disease in her father; Hypertension in her brother and sister; Pulmonary embolism in her mother; Thyroid disease in her mother; Ulcerative colitis in her brother.       Allergies:  Allergies   Allergen Reactions    Metformin And Related Nausea And Vomiting    Codeine  Rash    Morphine Rash    Propranolol Hcl GI Intolerance     Medications:  Prior to Admission medications    Medication Sig Start Date End Date Taking? Authorizing Provider   aspirin 81 MG EC tablet Take 1 tablet by mouth.   Yes Paulina Shelby MD   atorvastatin (LIPITOR) 20 MG tablet Take 1 tablet by mouth once daily 8/28/23  Yes Mary Matson APRN   Blood Glucose Monitoring Suppl (FreeStyle Lite) w/Device kit See Admin Instructions. for testing 11/8/22  Yes Paulina Shelby MD   escitalopram (LEXAPRO) 20 MG tablet Take 1 tablet by mouth once daily 9/8/23  Yes Mary Matson APRN   glucose blood test strip To test bid 11/8/22  Yes Mary Matson APRN   glucose monitor monitoring kit 1 each Continuous. 11/8/22  Yes Mary Matson APRN   Hydrocod Kavon-Chlorphe Kavon ER (TUSSIONEX PENNKINETIC) 10-8 MG/5ML ER suspension Take 5 mL by mouth Every 12 (Twelve) Hours As Needed for Cough. 10/13/23  Yes Mary Matson APRN   Lancets misc 1 each 2 (Two) Times a Day. 11/8/22  Yes Mary Matson APRN   levothyroxine sodium (Tirosint) 150 MCG capsule Take 1 capsule by mouth Daily. 9/28/23  Yes Reza Spencer MD   levothyroxine sodium (TIROSINT) 150 MCG capsule Take 1 capsule by mouth Daily. 9/28/23 9/28/24 Yes Paulina Shelby MD   losartan-hydrochlorothiazide (HYZAAR) 50-12.5 MG per tablet Take 1 tablet by mouth once daily 8/28/23  Yes Mary Matson APRN   meclizine (ANTIVERT) 25 MG tablet Take 1 tablet by mouth 3 (Three) Times a Day As Needed for Dizziness. 7/18/22  Yes Mary Matson APRN   metoprolol tartrate (LOPRESSOR) 25 MG tablet Take 1 tablet by mouth 2 (Two) Times a Day. 9/18/23  Yes Mary Matson APRN   Nirmatrelvir&Ritonavir 300/100 (PAXLOVID) 20 x 150 MG & 10 x 100MG tablet therapy pack tablet Take 2 tablets by mouth 2 (Two) Times a Day. 10/9/23  Yes Mary Matson APRN Semaglutide, 2  "MG/DOSE, (Ozempic, 2 MG/DOSE,) 8 MG/3ML solution pen-injector Inject 2 mg under the skin into the appropriate area as directed 1 (One) Time Per Week. 4/13/23  Yes Reza Spencer MD   traZODone (DESYREL) 50 MG tablet Take 1 tablet by mouth Every Night. 12/19/22  Yes Mary Matson APRN   Levothyroxine Sodium (Tirosint) 200 MCG capsule Take 1 capsule by mouth daily.  Patient taking differently: Take 200 mcg by mouth Daily. Takes one every day except Saturday 12/13/22 10/20/23 Yes Reza Spencer MD       Objective     Vital Signs: /85 (BP Location: Right arm, Patient Position: Sitting, Cuff Size: Adult)   Pulse 73   Temp 97.3 °F (36.3 °C) (Skin)   Resp 18   Ht 157.5 cm (62\")   Wt 76.3 kg (168 lb 3.2 oz)   SpO2 99%   BMI 30.76 kg/m²   Physical Exam  Vitals reviewed.   Constitutional:       Appearance: She is well-developed.   HENT:      Head: Normocephalic and atraumatic.   Eyes:      Pupils: Pupils are equal, round, and reactive to light.   Neck:      Vascular: No JVD.   Cardiovascular:      Rate and Rhythm: Normal rate and regular rhythm.   Pulmonary:      Effort: Pulmonary effort is normal.   Abdominal:      General: Bowel sounds are normal.      Palpations: Abdomen is soft.   Musculoskeletal:         General: No deformity.      Cervical back: Normal range of motion and neck supple.   Lymphadenopathy:      Cervical: No cervical adenopathy.   Skin:     General: Skin is warm and dry.   Neurological:      Mental Status: She is alert and oriented to person, place, and time.   Psychiatric:         Behavior: Behavior normal.         Thought Content: Thought content normal.         Judgment: Judgment normal.       Results Reviewed:  Glucose   Date Value Ref Range Status   09/20/2023 95 70 - 99 mg/dL Final   11/14/2022 99 65 - 99 mg/dL Final   10/15/2021 121 (H) 74 - 109 mg/dL Final     BUN   Date Value Ref Range Status   09/20/2023 14 6 - 24 mg/dL Final   11/14/2022 9 6 - 20 " mg/dL Final   10/15/2021 13 6 - 20 mg/dL Final     Creatinine   Date Value Ref Range Status   09/20/2023 1.02 (H) 0.57 - 1.00 mg/dL Final   11/14/2022 0.98 0.57 - 1.00 mg/dL Final   10/15/2021 0.8 0.5 - 0.9 mg/dL Final     Sodium   Date Value Ref Range Status   09/20/2023 141 134 - 144 mmol/L Final   11/14/2022 141 136 - 145 mmol/L Final   10/15/2021 138 136 - 145 mmol/L Final     Potassium   Date Value Ref Range Status   09/20/2023 4.1 3.5 - 5.2 mmol/L Final   11/14/2022 3.9 3.5 - 5.2 mmol/L Final   10/15/2021 4.1 3.5 - 5.0 mmol/L Final     Chloride   Date Value Ref Range Status   09/20/2023 101 96 - 106 mmol/L Final   11/14/2022 102 98 - 107 mmol/L Final   10/15/2021 100 98 - 111 mmol/L Final     CO2   Date Value Ref Range Status   11/14/2022 32.0 (H) 22.0 - 29.0 mmol/L Final   10/15/2021 28 22 - 29 mmol/L Final     Total CO2   Date Value Ref Range Status   09/20/2023 25 20 - 29 mmol/L Final     Calcium   Date Value Ref Range Status   09/20/2023 9.1 8.7 - 10.2 mg/dL Final   11/14/2022 9.1 8.6 - 10.5 mg/dL Final   10/15/2021 9.3 8.6 - 10.0 mg/dL Final     ALT (SGPT)   Date Value Ref Range Status   09/20/2023 20 0 - 32 IU/L Final   11/14/2022 30 1 - 33 U/L Final   10/15/2021 31 5 - 33 U/L Final     AST (SGOT)   Date Value Ref Range Status   09/20/2023 21 0 - 40 IU/L Final   11/14/2022 28 1 - 32 U/L Final   10/15/2021 25 5 - 32 U/L Final     WBC   Date Value Ref Range Status   09/20/2023 5.3 3.4 - 10.8 x10E3/uL Final   10/15/2021 5.8 4.8 - 10.8 K/uL Final     Hematocrit   Date Value Ref Range Status   09/20/2023 40.4 34.0 - 46.6 % Final   11/14/2022 41.1 34.0 - 46.6 % Final   10/15/2021 43.2 37.0 - 47.0 % Final     Platelets   Date Value Ref Range Status   09/20/2023 270 150 - 450 x10E3/uL Final   11/14/2022 260 140 - 450 10*3/mm3 Final   10/15/2021 274 130 - 400 K/uL Final     Triglycerides   Date Value Ref Range Status   09/20/2023 60 0 - 149 mg/dL Final   10/15/2021 134 0 - 149 mg/dL Final     HDL Cholesterol    Date Value Ref Range Status   09/20/2023 45 >39 mg/dL Final   10/15/2021 41 (L) 65 - 121 mg/dL Final     Comment:     VALUES>60 MG/DL ARE ASSOCIATED WITH A DECREASED RISK OF  ATHEROSCLEROTIC CARDIOVASCULAR DISEASE     LDL Cholesterol    Date Value Ref Range Status   10/15/2021 95 <100 mg/dL Final     Comment:     <100 MG/DL=OPITIMAL    100-129 MG/DL=DESIRABLE    130-159 MG/DL BORDERLINE=INCREASED RISK OF ATHEROSCLEROTIC  CARDIOVASCULAR DISEASE    > OR = 160 MG/DL=ASSOCIATED WITH AN INCREASE RISK OF  ATHEROSCLEROTIC CARDIOVASCULAR DISEASE     LDL Chol Calc (NIH)   Date Value Ref Range Status   09/20/2023 75 0 - 99 mg/dL Final     Hemoglobin A1C   Date Value Ref Range Status   09/20/2023 5.8 (H) %Hb Final     Comment:     Reference Range:  American Diabetes Association (ADA) Guidelines:  <5.7: Decreased risk for diabetes  5.7 - 6.4: Increased risk for diabetes  >6.4: Ongoing Hyperglycemia of any cause  <7.0: Glycemic control for adults with diabetes     11/14/2022 6.10 (H) 4.80 - 5.60 % Final   10/15/2021 6.2 (H) 4.0 - 6.0 % Final     Comment:     HbA1c levels >6% are an indication of hyperglycemia during the preceding 2  to 3 months or longer.    HbA1c levels may reach 20% or higher in poorly controlled diabetes.  Therapeutic action is suggested at levels above 8%.    Diabetes patients with HbA1c levels below 7% meet the goal of the American  Diabetes Association.    HbA1c levels below the established reference range may indicate recent  episodes of hypoglycemia, the presence of Hb variants, or shortened lifetime  of erythrocytes.          Assessment / Plan     Assessment/Plan:  Diagnoses and all orders for this visit:    1. Hypertension associated with diabetes (Primary)   Stable on current medication regimen.  2. Osteopenia of lumbar spine  -     risedronate (Actonel) 5 MG tablet; Take 1 tablet by mouth Every Morning Before Breakfast. with water on empty stomach, nothing by mouth or lie down for next 30 minutes.   Dispense: 30 tablet; Refill: 1    3. Need for influenza vaccination  -     Fluzone >6 Months ([id number])      No follow-ups on file. unless patient needs to be seen sooner or acute issues arise.    Code Status: Full.     I have discussed the patient results/orders and and plan/recommendation with them at today's visit.      JAKY Diaz   10/20/2023  Answers submitted by the patient for this visit:  Other (Submitted on 10/18/2023)  Please describe your symptoms.: Check-up  Have you had these symptoms before?: No  How long have you been having these symptoms?: Greater than 2 weeks  Please list any medications you are currently taking for this condition.: Check-up  Please describe any probable cause for these symptoms. : Check-up  Primary Reason for Visit (Submitted on 10/18/2023)  What is the primary reason for your visit?: Other  Transcribed from ambient dictation for JAKY Diaz by Tiara Quezada.  10/20/23   10:55 CDT    Patient or patient representative verbalized consent to the visit recording.  I have personally performed the services described in this document as transcribed by the above individual, and it is both accurate and complete.  JAKY Diaz  10/20/2023  18:00 CDT

## 2023-10-20 NOTE — LETTER
[facility]  Vaccine Consent Form    Patient Name:  Heidi Salmon :  1965     Vaccine(s) Ordered    Fluzone >6 Months ([id number])        Screening Checklist  The following questions should be completed prior to vaccination. If you answer “yes” to any question, it does not necessarily mean you should not be vaccinated. It just means we may need to clarify or ask more questions. If a question is unclear, please ask your healthcare provider to explain it.    Yes No   Any fever or moderate to severe illness today (mild illness and/or antibiotic treatment are not contraindications)?     Do you have a history of a serious reaction to any previous vaccinations, such as anaphylaxis, encephalopathy within 7 days, Guillain-Langley syndrome within 6 weeks, seizure?     Have you received any live vaccine(s) in the past month (MMR, GARY)?     Do you have an anaphylactic allergy to latex (DTaP, DTaP-IPV, Hep A, Hep B, MenB, RV, Td, Tdap), baker’s yeast (Hep B, HPV), or gelatin (GARY, MMR)?     Do you have an anaphylactic allergy to neomycin (Rabies, GARY, MMR, IPV, Hep A), polymyxin B (IPV), or streptomycin (IPV)?      Any cancer, leukemia, AIDS, or other immune system disorder? (GARY, MMR, RV)     Do you have a parent, brother, or sister with an immune system problem (if immune competence of vaccine recipient clinically verified, can proceed)? (MMR, GARY)     Any recent steroid treatments for >2 weeks, chemotherapy, or radiation treatment? (GARY, MMR)     Have you received antibody-containing blood transfusions or IVIG in the past 11 months (recommended interval is dependent on product)? (MMR, GARY)     Have you taken antiviral drugs (acyclovir, famciclovir, valacyclovir) in the last 24 or 48 hours, respectively (GARY)?      Are you pregnant or planning to become pregnant within 1 month? (GARY, MMR, HPV, IPV, MenB; For hep B- refer to Engerix-B)     For infants, have you ever been told your child has had intussusception or  a medical emergency involving obstruction of the intestine (RV)? If not for an infant, can skip this question.         *Ordering Physician/APC should be consulted if “yes” is checked by the patient or guardian above.      I have received, read, and understand the Vaccine Information Statement (VIS) for each vaccine ordered above.  I have considered my health status as well as the health status of my close contacts.  I have taken the opportunity to discuss my vaccine questions with my health care provider.   I have requested that the ordered vaccine(s) be given to me.  I understand the benefits and risks of the vaccines.  I understand that I should remain in the clinic for 15 minutes after receiving the vaccine(s).  _________________________________________________________  Signature of Patient or Parent/Legal Guardian ____________________  Date

## 2023-11-16 ENCOUNTER — PRIOR AUTHORIZATION (OUTPATIENT)
Dept: INTERNAL MEDICINE | Facility: CLINIC | Age: 58
End: 2023-11-16
Payer: MEDICAID

## 2023-11-16 NOTE — TELEPHONE ENCOUNTER
Key: [id number] - [place] Case ID: [id number] - Rx #: [id number] help? Call us at [phone]    Approved on October 31  The request has been approved. The authorization is effective for a maximum of 12 fills from 10/31/2023 to 10/30/2024, as long as the member is enrolled in their current health plan. The request was approved as submitted. A written notification letter will follow with additional details.    Risedronate Sodium 5MG tablets

## 2023-12-06 DIAGNOSIS — E78.5 DYSLIPIDEMIA (HIGH LDL; LOW HDL): ICD-10-CM

## 2023-12-06 RX ORDER — ATORVASTATIN CALCIUM 20 MG/1
TABLET, FILM COATED ORAL
Qty: 90 TABLET | Refills: 0 | Status: SHIPPED | OUTPATIENT
Start: 2023-12-06

## 2023-12-06 NOTE — TELEPHONE ENCOUNTER
Rx Refill Note  Requested Prescriptions     Pending Prescriptions Disp Refills    atorvastatin (LIPITOR) 20 MG tablet [Pharmacy Med Name: Atorvastatin Calcium 20 MG Oral Tablet] 90 tablet 0     Sig: Take 1 tablet by mouth once daily      Last office visit with prescribing clinician: 10/20/2023   Last telemedicine visit with prescribing clinician: Visit date not found   Next office visit with prescribing clinician: 1/19/2024                         Would you like a call back once the refill request has been completed: [] Yes [] No    If the office needs to give you a call back, can they leave a voicemail: [] Yes [] No    Rubina Cash RN  12/06/23, 07:11 CST

## 2023-12-13 NOTE — TELEPHONE ENCOUNTER
Rx Refill Note  Requested Prescriptions     Pending Prescriptions Disp Refills    escitalopram (LEXAPRO) 20 MG tablet [Pharmacy Med Name: Escitalopram Oxalate 20 MG Oral Tablet] 90 tablet 0     Sig: Take 1 tablet by mouth once daily      Last office visit with prescribing clinician: 10/20/2023   Last telemedicine visit with prescribing clinician: Visit date not found   Next office visit with prescribing clinician: 1/19/2024                         Would you like a call back once the refill request has been completed: [] Yes [] No    If the office needs to give you a call back, can they leave a voicemail: [] Yes [] No    Rubina Cash RN  12/13/23, 10:44 CST

## 2023-12-14 RX ORDER — ESCITALOPRAM OXALATE 20 MG/1
TABLET ORAL
Qty: 90 TABLET | Refills: 0 | Status: SHIPPED | OUTPATIENT
Start: 2023-12-14

## 2023-12-22 ENCOUNTER — OFFICE VISIT (OUTPATIENT)
Dept: INTERNAL MEDICINE | Facility: CLINIC | Age: 58
End: 2023-12-22
Payer: MEDICAID

## 2023-12-22 VITALS
WEIGHT: 170 LBS | DIASTOLIC BLOOD PRESSURE: 84 MMHG | SYSTOLIC BLOOD PRESSURE: 120 MMHG | RESPIRATION RATE: 16 BRPM | TEMPERATURE: 97.7 F | HEIGHT: 62 IN | OXYGEN SATURATION: 97 % | BODY MASS INDEX: 31.28 KG/M2 | HEART RATE: 76 BPM

## 2023-12-22 DIAGNOSIS — J02.9 SORE THROAT: Primary | ICD-10-CM

## 2023-12-22 DIAGNOSIS — R05.1 ACUTE COUGH: ICD-10-CM

## 2023-12-22 DIAGNOSIS — J06.9 UPPER RESPIRATORY TRACT INFECTION, UNSPECIFIED TYPE: ICD-10-CM

## 2023-12-22 LAB
EXPIRATION DATE: NORMAL
EXPIRATION DATE: NORMAL
FLUAV AG UPPER RESP QL IA.RAPID: NOT DETECTED
FLUBV AG UPPER RESP QL IA.RAPID: NOT DETECTED
INTERNAL CONTROL: NORMAL
INTERNAL CONTROL: NORMAL
Lab: NORMAL
Lab: NORMAL
S PYO AG THROAT QL: NEGATIVE
SARS-COV-2 AG UPPER RESP QL IA.RAPID: NOT DETECTED

## 2023-12-22 PROCEDURE — 99213 OFFICE O/P EST LOW 20 MIN: CPT | Performed by: NURSE PRACTITIONER

## 2023-12-22 PROCEDURE — 87880 STREP A ASSAY W/OPTIC: CPT | Performed by: NURSE PRACTITIONER

## 2023-12-22 RX ORDER — AMOXICILLIN AND CLAVULANATE POTASSIUM 875; 125 MG/1; MG/1
1 TABLET, FILM COATED ORAL 2 TIMES DAILY
Qty: 14 TABLET | Refills: 0 | Status: SHIPPED | OUTPATIENT
Start: 2023-12-22

## 2023-12-22 NOTE — PROGRESS NOTES
Subjective     Chief Complaint   Patient presents with    Sore Throat       Sore Throat       Pt comes in today with complaints of congestion sore throat  and just started this am. Her granddaughter had strep throat earlier in the week and she was concerned.  No fevers.     Review of Systems   HENT:  Positive for sore throat.       Otherwise complete ROS reviewed and negative except as mentioned in the HPI.    Past Medical History:   Past Medical History:   Diagnosis Date    Anxiety     Depression     Diabetes mellitus     Hyperlipidemia     Hypertension     Hyperthyroidism     Hypothyroidism 2010 2019 had right side removed    Obesity     Parkinson's disease     Pneumonia 1991, 2002, 2006    The are the years I had pneumonia    Tremor     Vasovagal syncope      Past Surgical History:  Past Surgical History:   Procedure Laterality Date    CARPAL TUNNEL RELEASE Right 06/28/2022    Procedure: RIGHT CARPAL TUNNEL RELEASE;  Surgeon: Zeyad Gilbert MD;  Location:  [facility] OR;  Service: Orthopedics;  Laterality: Right;    CARPAL TUNNEL RELEASE Left 8/2/2022    Procedure: LEFT CARPAL TUNNEL RELEASE;  Surgeon: Zeyad Gilbert MD;  Location: [facility] OR;  Service: Orthopedics;  Laterality: Left;    FOOT SURGERY Bilateral     HAMMERTOE AND BUNION REPAIR; 2017 & 2019    HYSTERECTOMY      THYROIDECTOMY, PARTIAL Right      Social History:  reports that she has never smoked. She has never used smokeless tobacco. She reports that she does not drink alcohol and does not use drugs.    Family History: family history includes Cancer in her sister; Diabetes in her brother, maternal grandmother, and sister; Heart disease in her father; Hypertension in her brother and sister; Pulmonary embolism in her mother; Thyroid disease in her mother; Ulcerative colitis in her brother.       Allergies:  Allergies   Allergen Reactions    Metformin And Related Nausea And Vomiting    Codeine Rash    Morphine Rash    Propranolol  Hcl GI Intolerance     Medications:  Prior to Admission medications    Medication Sig Start Date End Date Taking? Authorizing Provider   atorvastatin (LIPITOR) 20 MG tablet Take 1 tablet by mouth once daily 12/6/23   Mary Matson APRN   Tirzepatide (MOUNJARO) 5 MG/0.5ML solution pen-injector pen Inject 0.5 mL under the skin into the appropriate area as directed. 11/20/23 11/21/24 Yes Paulina Shelby MD   aspirin 81 MG EC tablet Take 1 tablet by mouth.    Paulina Shelby MD   Blood Glucose Monitoring Suppl (FreeStyle Lite) w/Device kit See Admin Instructions. for testing 11/8/22   Paulina Shelby MD   escitalopram (LEXAPRO) 20 MG tablet Take 1 tablet by mouth once daily 12/14/23   Mary Matson APRN   glucose blood test strip To test bid 11/8/22   Mary Matson APRN   glucose monitor monitoring kit 1 each Continuous. 11/8/22   Mary Matson APRN   Hydrocod Kavon-Chlorphe Kavon ER (TUSSIONEX PENNKINETIC) 10-8 MG/5ML ER suspension Take 5 mL by mouth Every 12 (Twelve) Hours As Needed for Cough. 10/13/23   Mary Matson APRN   Lancets misc 1 each 2 (Two) Times a Day. 11/8/22   Mary Matson APRN   levothyroxine sodium (Tirosint) 150 MCG capsule Take 1 capsule by mouth Daily. 9/28/23   Reza Spencer MD   levothyroxine sodium (TIROSINT) 150 MCG capsule Take 1 capsule by mouth Daily. 9/28/23 9/28/24  Paulina Shelby MD   losartan-hydrochlorothiazide (HYZAAR) 50-12.5 MG per tablet Take 1 tablet by mouth once daily 8/28/23   Mary Matson APRN   meclizine (ANTIVERT) 25 MG tablet Take 1 tablet by mouth 3 (Three) Times a Day As Needed for Dizziness. 7/18/22   Mary Matson APRN   metoprolol tartrate (LOPRESSOR) 25 MG tablet Take 1 tablet by mouth 2 (Two) Times a Day. 9/18/23   Mary Matson APRN   Nirmatrelvir&Ritonavir 300/100 (PAXLOVID) 20 x 150 MG & 10 x 100MG tablet therapy pack tablet Take 2  "tablets by mouth 2 (Two) Times a Day. 10/9/23   Mary Matson APRN   risedronate (Actonel) 5 MG tablet Take 1 tablet by mouth Every Morning Before Breakfast. with water on empty stomach, nothing by mouth or lie down for next 30 minutes. 10/20/23   Mary Matson APRN   Semaglutide, 2 MG/DOSE, (Ozempic, 2 MG/DOSE,) 8 MG/3ML solution pen-injector Inject 2 mg under the skin into the appropriate area as directed 1 (One) Time Per Week. 4/13/23   Reza Spencer MD   traZODone (DESYREL) 50 MG tablet Take 1 tablet by mouth Every Night. 12/19/22   Mary Matson APRN       Objective     Vital Signs: /84   Pulse 76   Temp 97.7 °F (36.5 °C)   Resp 16   Ht 157.5 cm (62\")   Wt 77.1 kg (170 lb)   SpO2 97%   BMI 31.09 kg/m²   Physical Exam  Vitals reviewed.   Constitutional:       Appearance: She is well-developed.   HENT:      Head: Normocephalic and atraumatic.   Eyes:      Pupils: Pupils are equal, round, and reactive to light.   Neck:      Vascular: No JVD.   Cardiovascular:      Rate and Rhythm: Normal rate and regular rhythm.   Pulmonary:      Effort: Pulmonary effort is normal.   Abdominal:      General: Bowel sounds are normal.      Palpations: Abdomen is soft.   Musculoskeletal:         General: No deformity.      Cervical back: Normal range of motion and neck supple.   Lymphadenopathy:      Cervical: No cervical adenopathy.   Skin:     General: Skin is warm and dry.   Neurological:      Mental Status: She is alert and oriented to person, place, and time.   Psychiatric:         Behavior: Behavior normal.         Thought Content: Thought content normal.         Judgment: Judgment normal.       Results Reviewed:  Glucose   Date Value Ref Range Status   09/20/2023 95 70 - 99 mg/dL Final   11/14/2022 99 65 - 99 mg/dL Final   10/15/2021 121 (H) 74 - 109 mg/dL Final     BUN   Date Value Ref Range Status   09/20/2023 14 6 - 24 mg/dL Final   11/14/2022 9 6 - 20 mg/dL Final "   10/15/2021 13 6 - 20 mg/dL Final     Creatinine   Date Value Ref Range Status   09/20/2023 1.02 (H) 0.57 - 1.00 mg/dL Final   11/14/2022 0.98 0.57 - 1.00 mg/dL Final   10/15/2021 0.8 0.5 - 0.9 mg/dL Final     Sodium   Date Value Ref Range Status   09/20/2023 141 134 - 144 mmol/L Final   11/14/2022 141 136 - 145 mmol/L Final   10/15/2021 138 136 - 145 mmol/L Final     Potassium   Date Value Ref Range Status   09/20/2023 4.1 3.5 - 5.2 mmol/L Final   11/14/2022 3.9 3.5 - 5.2 mmol/L Final   10/15/2021 4.1 3.5 - 5.0 mmol/L Final     Chloride   Date Value Ref Range Status   09/20/2023 101 96 - 106 mmol/L Final   11/14/2022 102 98 - 107 mmol/L Final   10/15/2021 100 98 - 111 mmol/L Final     CO2   Date Value Ref Range Status   11/14/2022 32.0 (H) 22.0 - 29.0 mmol/L Final   10/15/2021 28 22 - 29 mmol/L Final     Total CO2   Date Value Ref Range Status   09/20/2023 25 20 - 29 mmol/L Final     Calcium   Date Value Ref Range Status   09/20/2023 9.1 8.7 - 10.2 mg/dL Final   11/14/2022 9.1 8.6 - 10.5 mg/dL Final   10/15/2021 9.3 8.6 - 10.0 mg/dL Final     ALT (SGPT)   Date Value Ref Range Status   09/20/2023 20 0 - 32 IU/L Final   11/14/2022 30 1 - 33 U/L Final   10/15/2021 31 5 - 33 U/L Final     AST (SGOT)   Date Value Ref Range Status   09/20/2023 21 0 - 40 IU/L Final   11/14/2022 28 1 - 32 U/L Final   10/15/2021 25 5 - 32 U/L Final     WBC   Date Value Ref Range Status   09/20/2023 5.3 3.4 - 10.8 x10E3/uL Final   10/15/2021 5.8 4.8 - 10.8 K/uL Final     Hematocrit   Date Value Ref Range Status   09/20/2023 40.4 34.0 - 46.6 % Final   11/14/2022 41.1 34.0 - 46.6 % Final   10/15/2021 43.2 37.0 - 47.0 % Final     Platelets   Date Value Ref Range Status   09/20/2023 270 150 - 450 x10E3/uL Final   11/14/2022 260 140 - 450 10*3/mm3 Final   10/15/2021 274 130 - 400 K/uL Final     Triglycerides   Date Value Ref Range Status   09/20/2023 60 0 - 149 mg/dL Final   10/15/2021 134 0 - 149 mg/dL Final     HDL Cholesterol   Date Value Ref  Range Status   09/20/2023 45 >39 mg/dL Final   10/15/2021 41 (L) 65 - 121 mg/dL Final     Comment:     VALUES>60 MG/DL ARE ASSOCIATED WITH A DECREASED RISK OF  ATHEROSCLEROTIC CARDIOVASCULAR DISEASE     LDL Cholesterol    Date Value Ref Range Status   10/15/2021 95 <100 mg/dL Final     Comment:     <100 MG/DL=OPITIMAL    100-129 MG/DL=DESIRABLE    130-159 MG/DL BORDERLINE=INCREASED RISK OF ATHEROSCLEROTIC  CARDIOVASCULAR DISEASE    > OR = 160 MG/DL=ASSOCIATED WITH AN INCREASE RISK OF  ATHEROSCLEROTIC CARDIOVASCULAR DISEASE     LDL Chol Calc (NIH)   Date Value Ref Range Status   09/20/2023 75 0 - 99 mg/dL Final     Hemoglobin A1C   Date Value Ref Range Status   09/20/2023 5.8 (H) %Hb Final     Comment:     Reference Range:  American Diabetes Association (ADA) Guidelines:  <5.7: Decreased risk for diabetes  5.7 - 6.4: Increased risk for diabetes  >6.4: Ongoing Hyperglycemia of any cause  <7.0: Glycemic control for adults with diabetes     11/14/2022 6.10 (H) 4.80 - 5.60 % Final   10/15/2021 6.2 (H) 4.0 - 6.0 % Final     Comment:     HbA1c levels >6% are an indication of hyperglycemia during the preceding 2  to 3 months or longer.    HbA1c levels may reach 20% or higher in poorly controlled diabetes.  Therapeutic action is suggested at levels above 8%.    Diabetes patients with HbA1c levels below 7% meet the goal of the American  Diabetes Association.    HbA1c levels below the established reference range may indicate recent  episodes of hypoglycemia, the presence of Hb variants, or shortened lifetime  of erythrocytes.          Assessment / Plan     Assessment/Plan:  Diagnoses and all orders for this visit:    1. Sore throat (Primary)  -     POCT SARS-CoV-2 Antigen ROSANA + Flu  -     POCT rapid strep A    2. Acute cough  -     POCT SARS-CoV-2 Antigen ROSANA + Flu  -     POCT rapid strep A    3. Upper respiratory tract infection, unspecified type  -     amoxicillin-clavulanate (AUGMENTIN) 875-125 MG per tablet; Take 1 tablet  by mouth 2 (Two) Times a Day.  Dispense: 14 tablet; Refill: 0    I have given her antibiotics in case she worsens. She does not have any COVID/FLU/STREP currently; however she just started having symptoms.   We discussed side effects of the medication and to take with food and make sure she is getting probiotics in to help with GI side effects.     No follow-ups on file. unless patient needs to be seen sooner or acute issues arise.    Code Status: Full.     I have discussed the patient results/orders and and plan/recommendation with them at today's visit.      Mary Matson, APRN   12/22/2023

## 2024-01-19 ENCOUNTER — OFFICE VISIT (OUTPATIENT)
Dept: INTERNAL MEDICINE | Facility: CLINIC | Age: 59
End: 2024-01-19
Payer: MEDICAID

## 2024-01-19 VITALS
HEIGHT: 62 IN | TEMPERATURE: 98.4 F | WEIGHT: 164 LBS | BODY MASS INDEX: 30.18 KG/M2 | HEART RATE: 93 BPM | OXYGEN SATURATION: 98 % | RESPIRATION RATE: 16 BRPM | SYSTOLIC BLOOD PRESSURE: 130 MMHG | DIASTOLIC BLOOD PRESSURE: 84 MMHG

## 2024-01-19 DIAGNOSIS — G89.29 CHRONIC PAIN OF RIGHT KNEE: ICD-10-CM

## 2024-01-19 DIAGNOSIS — E11.59 HYPERTENSION ASSOCIATED WITH DIABETES: Primary | ICD-10-CM

## 2024-01-19 DIAGNOSIS — I15.2 HYPERTENSION ASSOCIATED WITH DIABETES: Primary | ICD-10-CM

## 2024-01-19 DIAGNOSIS — M25.561 CHRONIC PAIN OF RIGHT KNEE: ICD-10-CM

## 2024-01-19 PROCEDURE — 3079F DIAST BP 80-89 MM HG: CPT | Performed by: NURSE PRACTITIONER

## 2024-01-19 PROCEDURE — 1159F MED LIST DOCD IN RCRD: CPT | Performed by: NURSE PRACTITIONER

## 2024-01-19 PROCEDURE — 99213 OFFICE O/P EST LOW 20 MIN: CPT | Performed by: NURSE PRACTITIONER

## 2024-01-19 PROCEDURE — 3075F SYST BP GE 130 - 139MM HG: CPT | Performed by: NURSE PRACTITIONER

## 2024-01-19 PROCEDURE — 1160F RVW MEDS BY RX/DR IN RCRD: CPT | Performed by: NURSE PRACTITIONER

## 2024-01-19 NOTE — PROGRESS NOTES
"        Subjective     Chief Complaint   Patient presents with    Hypertension    Knee Pain     Right Knee       Hypertension    Knee Pain     Heidi Dhaliwal presents to the clinic today for evaluation of chronic right knee pain.    Ms. Dhaliwal has been experiencing pain in her right knee when standing up. Her knee will lock up, and she nearly falls due to the pain. She reports that her knee used to \"crunch\" similarly to her left knee, but this does not occur much anymore. When she is going up or down stairs, she has to use her left leg first. The patient has experienced her knee pain for quite some time, but it has been worsening.    The patient has an upcoming visit scheduled with Dr. Saez in 03/2024. She had been taking Ozempic 2 mg, but she began to gain some of her weight back. Dr. Saez ultimately transitioned her to Mounjaro 0.5 mg, and she has been able to lose 6 pounds since 12/22/2023. She was previously taking levothyroxine 200 mcg 5 days a week, but this was reduced to 150 mcg daily. The patient denies any chest pain or shortness of breath. She feels that her dizziness is well controlled at this time.      Review of Systems   Otherwise complete ROS reviewed and negative except as mentioned in the HPI.    Past Medical History:   Past Medical History:   Diagnosis Date    Anxiety     Depression     Diabetes mellitus     Hyperlipidemia     Hypertension     Hyperthyroidism     Hypothyroidism 2010 2019 had right side removed    Obesity     Parkinson's disease     Pneumonia 1991, 2002, 2006    The are the years I had pneumonia    Tremor     Vasovagal syncope      Past Surgical History:  Past Surgical History:   Procedure Laterality Date    CARPAL TUNNEL RELEASE Right 06/28/2022    Procedure: RIGHT CARPAL TUNNEL RELEASE;  Surgeon: Zeyad Gilbert MD;  Location: [facility];  Service: Orthopedics;  Laterality: Right;    CARPAL TUNNEL RELEASE Left 8/2/2022    Procedure: LEFT CARPAL TUNNEL RELEASE;  " Surgeon: Zeyad Gilbert MD;  Location: [facility];  Service: Orthopedics;  Laterality: Left;    FOOT SURGERY Bilateral     HAMMERTOE AND BUNION REPAIR; 2017 & 2019    HYSTERECTOMY      THYROIDECTOMY, PARTIAL Right      Social History:  reports that she has never smoked. She has never used smokeless tobacco. She reports that she does not drink alcohol and does not use drugs.    Family History: family history includes Cancer in her sister; Diabetes in her brother, maternal grandmother, and sister; Heart disease in her father; Hypertension in her brother and sister; Pulmonary embolism in her mother; Thyroid disease in her mother; Ulcerative colitis in her brother.       Allergies:  Allergies   Allergen Reactions    Metformin And Related Nausea And Vomiting    Codeine Rash    Morphine Rash    Propranolol Hcl GI Intolerance     Medications:  Prior to Admission medications    Medication Sig Start Date End Date Taking? Authorizing Provider   atorvastatin (LIPITOR) 20 MG tablet Take 1 tablet by mouth once daily 12/6/23   Mary Matson APRN   amoxicillin-clavulanate (AUGMENTIN) 875-125 MG per tablet Take 1 tablet by mouth 2 (Two) Times a Day. 12/22/23   Mary Matson APRN   aspirin 81 MG EC tablet Take 1 tablet by mouth.    Provider, MD Paulina   Blood Glucose Monitoring Suppl (FreeStyle Lite) w/Device kit See Admin Instructions. for testing 11/8/22   ProviderPaulina MD   escitalopram (LEXAPRO) 20 MG tablet Take 1 tablet by mouth once daily 12/14/23   Mary Matson APRN   glucose blood test strip To test bid 11/8/22   Mary Matson APRN   glucose monitor monitoring kit 1 each Continuous. 11/8/22   Mary Matson APRN   Hydrocod Kavon-Chlorphe Kavon ER (TUSSIONEX PENNKINETIC) 10-8 MG/5ML ER suspension Take 5 mL by mouth Every 12 (Twelve) Hours As Needed for Cough. 10/13/23   Mary Matson APRN   Lancets misc 1 each 2 (Two) Times a Day. 11/8/22    "Mary Matson APRN   levothyroxine sodium (Tirosint) 150 MCG capsule Take 1 capsule by mouth Daily. 9/28/23   Reza Spencer MD   levothyroxine sodium (TIROSINT) 150 MCG capsule Take 1 capsule by mouth Daily. 9/28/23 9/28/24  Provider, MD Paulina   losartan-hydrochlorothiazide (HYZAAR) 50-12.5 MG per tablet Take 1 tablet by mouth once daily 8/28/23   Mary Matson APRN   meclizine (ANTIVERT) 25 MG tablet Take 1 tablet by mouth 3 (Three) Times a Day As Needed for Dizziness. 7/18/22   Mary Matson APRN   metoprolol tartrate (LOPRESSOR) 25 MG tablet Take 1 tablet by mouth 2 (Two) Times a Day. 9/18/23   Mary Matson APRN   Nirmatrelvir&Ritonavir 300/100 (PAXLOVID) 20 x 150 MG & 10 x 100MG tablet therapy pack tablet Take 2 tablets by mouth 2 (Two) Times a Day. 10/9/23   Mary Matson APRN   risedronate (Actonel) 5 MG tablet Take 1 tablet by mouth Every Morning Before Breakfast. with water on empty stomach, nothing by mouth or lie down for next 30 minutes. 10/20/23   Mary Matson APRN   Semaglutide, 2 MG/DOSE, (Ozempic, 2 MG/DOSE,) 8 MG/3ML solution pen-injector Inject 2 mg under the skin into the appropriate area as directed 1 (One) Time Per Week. 4/13/23   Reza Spencer MD   Tirzepatide (MOUNJARO) 5 MG/0.5ML solution pen-injector pen Inject 0.5 mL under the skin into the appropriate area as directed. 11/20/23 11/21/24  Provider, MD Paulina   traZODone (DESYREL) 50 MG tablet Take 1 tablet by mouth Every Night. 12/19/22   Mary Matson APRN       Objective     Vital Signs: /84   Pulse 93   Temp 98.4 °F (36.9 °C)   Resp 16   Ht 157.5 cm (62\")   Wt 74.4 kg (164 lb)   SpO2 98%   BMI 30.00 kg/m²   Physical Exam  Vitals reviewed.   Constitutional:       Appearance: She is well-developed. She is obese.   HENT:      Head: Normocephalic and atraumatic.   Eyes:      Pupils: Pupils are equal, round, and " reactive to light.   Neck:      Vascular: No JVD.   Cardiovascular:      Rate and Rhythm: Normal rate and regular rhythm.   Pulmonary:      Effort: Pulmonary effort is normal.   Abdominal:      General: Bowel sounds are normal.      Palpations: Abdomen is soft.   Musculoskeletal:         General: Tenderness (right knee lateral patellar tenderness.) present. No deformity.      Cervical back: Normal range of motion and neck supple.   Lymphadenopathy:      Cervical: No cervical adenopathy.   Skin:     General: Skin is warm and dry.   Neurological:      Mental Status: She is alert and oriented to person, place, and time.   Psychiatric:         Behavior: Behavior normal.         Thought Content: Thought content normal.         Judgment: Judgment normal.     Results Reviewed:  Glucose   Date Value Ref Range Status   09/20/2023 95 70 - 99 mg/dL Final   11/14/2022 99 65 - 99 mg/dL Final   10/15/2021 121 (H) 74 - 109 mg/dL Final     BUN   Date Value Ref Range Status   09/20/2023 14 6 - 24 mg/dL Final   11/14/2022 9 6 - 20 mg/dL Final   10/15/2021 13 6 - 20 mg/dL Final     Creatinine   Date Value Ref Range Status   09/20/2023 1.02 (H) 0.57 - 1.00 mg/dL Final   11/14/2022 0.98 0.57 - 1.00 mg/dL Final   10/15/2021 0.8 0.5 - 0.9 mg/dL Final     Sodium   Date Value Ref Range Status   09/20/2023 141 134 - 144 mmol/L Final   11/14/2022 141 136 - 145 mmol/L Final   10/15/2021 138 136 - 145 mmol/L Final     Potassium   Date Value Ref Range Status   09/20/2023 4.1 3.5 - 5.2 mmol/L Final   11/14/2022 3.9 3.5 - 5.2 mmol/L Final   10/15/2021 4.1 3.5 - 5.0 mmol/L Final     Chloride   Date Value Ref Range Status   09/20/2023 101 96 - 106 mmol/L Final   11/14/2022 102 98 - 107 mmol/L Final   10/15/2021 100 98 - 111 mmol/L Final     CO2   Date Value Ref Range Status   11/14/2022 32.0 (H) 22.0 - 29.0 mmol/L Final   10/15/2021 28 22 - 29 mmol/L Final     Total CO2   Date Value Ref Range Status   09/20/2023 25 20 - 29 mmol/L Final     Calcium    Date Value Ref Range Status   09/20/2023 9.1 8.7 - 10.2 mg/dL Final   11/14/2022 9.1 8.6 - 10.5 mg/dL Final   10/15/2021 9.3 8.6 - 10.0 mg/dL Final     ALT (SGPT)   Date Value Ref Range Status   09/20/2023 20 0 - 32 IU/L Final   11/14/2022 30 1 - 33 U/L Final   10/15/2021 31 5 - 33 U/L Final     AST (SGOT)   Date Value Ref Range Status   09/20/2023 21 0 - 40 IU/L Final   11/14/2022 28 1 - 32 U/L Final   10/15/2021 25 5 - 32 U/L Final     WBC   Date Value Ref Range Status   09/20/2023 5.3 3.4 - 10.8 x10E3/uL Final   10/15/2021 5.8 4.8 - 10.8 K/uL Final     Hematocrit   Date Value Ref Range Status   09/20/2023 40.4 34.0 - 46.6 % Final   11/14/2022 41.1 34.0 - 46.6 % Final   10/15/2021 43.2 37.0 - 47.0 % Final     Platelets   Date Value Ref Range Status   09/20/2023 270 150 - 450 x10E3/uL Final   11/14/2022 260 140 - 450 10*3/mm3 Final   10/15/2021 274 130 - 400 K/uL Final     Triglycerides   Date Value Ref Range Status   09/20/2023 60 0 - 149 mg/dL Final   10/15/2021 134 0 - 149 mg/dL Final     HDL Cholesterol   Date Value Ref Range Status   09/20/2023 45 >39 mg/dL Final   10/15/2021 41 (L) 65 - 121 mg/dL Final     Comment:     VALUES>60 MG/DL ARE ASSOCIATED WITH A DECREASED RISK OF  ATHEROSCLEROTIC CARDIOVASCULAR DISEASE     LDL Cholesterol    Date Value Ref Range Status   10/15/2021 95 <100 mg/dL Final     Comment:     <100 MG/DL=OPITIMAL    100-129 MG/DL=DESIRABLE    130-159 MG/DL BORDERLINE=INCREASED RISK OF ATHEROSCLEROTIC  CARDIOVASCULAR DISEASE    > OR = 160 MG/DL=ASSOCIATED WITH AN INCREASE RISK OF  ATHEROSCLEROTIC CARDIOVASCULAR DISEASE     LDL Chol Calc (NIH)   Date Value Ref Range Status   09/20/2023 75 0 - 99 mg/dL Final     Hemoglobin A1C   Date Value Ref Range Status   09/20/2023 5.8 (H) %Hb Final     Comment:     Reference Range:  American Diabetes Association (ADA) Guidelines:  <5.7: Decreased risk for diabetes  5.7 - 6.4: Increased risk for diabetes  >6.4: Ongoing Hyperglycemia of any cause  <7.0:  Glycemic control for adults with diabetes     11/14/2022 6.10 (H) 4.80 - 5.60 % Final   10/15/2021 6.2 (H) 4.0 - 6.0 % Final     Comment:     HbA1c levels >6% are an indication of hyperglycemia during the preceding 2  to 3 months or longer.    HbA1c levels may reach 20% or higher in poorly controlled diabetes.  Therapeutic action is suggested at levels above 8%.    Diabetes patients with HbA1c levels below 7% meet the goal of the American  Diabetes Association.    HbA1c levels below the established reference range may indicate recent  episodes of hypoglycemia, the presence of Hb variants, or shortened lifetime  of erythrocytes.          Assessment / Plan     Assessment/Plan:  Diagnoses and all orders for this visit:    1. Hypertension associated with diabetes (Primary)    2. Chronic pain of right knee  -     XR Knee 1 or 2 View Right (In Office)      Hypertension associated with diabetes  The patient will continue her current medications.    Chronic pain of right knee  An x-ray of the right knee has been ordered.    Return in about 3 months (around 4/19/2024). unless patient needs to be seen sooner or acute issues arise.    Code Status: full    I have discussed the patient results/orders and and plan/recommendation with them at today's visit.      Signed by:    JAKY Diaz Date: 01/19/24    Transcribed from ambient dictation for JAKY Diaz by Mary Hui.  01/19/24   10:15 CST    Patient or patient representative verbalized consent to the visit recording.  I have personally performed the services described in this document as transcribed by the above individual, and it is both accurate and complete.  JAKY Diaz  1/21/2024  19:50 CST   Answers submitted by the patient for this visit:  Other (Submitted on 1/17/2024)  Please describe your symptoms.: 3 month check-up  Have you had these symptoms before?: No  How long have you been having these symptoms?: 1-4  days  Please list any medications you are currently taking for this condition.: Check-up  Please describe any probable cause for these symptoms. : Check-up  Primary Reason for Visit (Submitted on 1/17/2024)  What is the primary reason for your visit?: Other

## 2024-01-22 DIAGNOSIS — G89.29 CHRONIC PAIN OF RIGHT KNEE: ICD-10-CM

## 2024-01-22 DIAGNOSIS — M25.561 CHRONIC PAIN OF RIGHT KNEE: ICD-10-CM

## 2024-01-22 DIAGNOSIS — M19.90 ARTHRITIS: Primary | ICD-10-CM

## 2024-01-25 DIAGNOSIS — M85.88 OSTEOPENIA OF LUMBAR SPINE: ICD-10-CM

## 2024-01-25 RX ORDER — RISEDRONATE SODIUM 5 MG/1
5 TABLET, FILM COATED ORAL
Qty: 30 TABLET | Refills: 1 | Status: SHIPPED | OUTPATIENT
Start: 2024-01-25

## 2024-01-25 NOTE — TELEPHONE ENCOUNTER
Rx Refill Note  Requested Prescriptions     Pending Prescriptions Disp Refills    risedronate (Actonel) 5 MG tablet 30 tablet 1     Sig: Take 1 tablet by mouth Every Morning Before Breakfast. with water on empty stomach, nothing by mouth or lie down for next 30 minutes.      Last office visit with prescribing clinician: 1/19/2024   Last telemedicine visit with prescribing clinician: Visit date not found   Next office visit with prescribing clinician: 4/12/2024                         Would you like a call back once the refill request has been completed: [] Yes [] No    If the office needs to give you a call back, can they leave a voicemail: [] Yes [] No    Elena Wadsworth MA  01/25/24, 09:34 CST

## 2024-01-26 ENCOUNTER — PATIENT MESSAGE (OUTPATIENT)
Dept: INTERNAL MEDICINE | Facility: CLINIC | Age: 59
End: 2024-01-26
Payer: MEDICAID

## 2024-01-26 ENCOUNTER — REFERRAL TRIAGE (OUTPATIENT)
Dept: CASE MANAGEMENT | Facility: OTHER | Age: 59
End: 2024-01-26
Payer: MEDICAID

## 2024-01-26 DIAGNOSIS — N39.498 OTHER URINARY INCONTINENCE: ICD-10-CM

## 2024-01-26 DIAGNOSIS — R35.0 URINARY FREQUENCY: Primary | ICD-10-CM

## 2024-01-31 ENCOUNTER — PATIENT OUTREACH (OUTPATIENT)
Dept: CASE MANAGEMENT | Facility: OTHER | Age: 59
End: 2024-01-31
Payer: MEDICAID

## 2024-01-31 NOTE — OUTREACH NOTE
AMBULATORY CASE MANAGEMENT NOTE    Name and Relationship of Patient/Support Person: Heidi Dhaliwal - Self    Patient Outreach    Received PCP referral for assistance with incontinent supplies. Per chart review, patient sent MyChart communication that her insurance would contact PCP office for orders. Spoke with patient to request she contact her insurance to see if they have sent an order request for incontinent supplies to PCP office. Patient will update ACM.         Negra JACOME  Ambulatory Case Management    1/31/2024, 10:44 CST

## 2024-02-29 DIAGNOSIS — E78.5 DYSLIPIDEMIA (HIGH LDL; LOW HDL): ICD-10-CM

## 2024-02-29 RX ORDER — ATORVASTATIN CALCIUM 20 MG/1
TABLET, FILM COATED ORAL
Qty: 90 TABLET | Refills: 1 | Status: SHIPPED | OUTPATIENT
Start: 2024-02-29

## 2024-02-29 NOTE — TELEPHONE ENCOUNTER
Rx Refill Note  Requested Prescriptions     Pending Prescriptions Disp Refills    atorvastatin (LIPITOR) 20 MG tablet [Pharmacy Med Name: Atorvastatin Calcium 20 MG Oral Tablet] 90 tablet 0     Sig: Take 1 tablet by mouth once daily      Last office visit with prescribing clinician: 1/19/2024   Last telemedicine visit with prescribing clinician: Visit date not found   Next office visit with prescribing clinician: 4/12/2024                         Would you like a call back once the refill request has been completed: [] Yes [] No    If the office needs to give you a call back, can they leave a voicemail: [] Yes [] No    Rubina Cash RN  02/29/24, 10:28 CST

## 2024-03-12 RX ORDER — ESCITALOPRAM OXALATE 20 MG/1
TABLET ORAL
Qty: 90 TABLET | Refills: 1 | Status: SHIPPED | OUTPATIENT
Start: 2024-03-12

## 2024-03-12 NOTE — TELEPHONE ENCOUNTER
Rx Refill Note  Requested Prescriptions     Pending Prescriptions Disp Refills    escitalopram (LEXAPRO) 20 MG tablet [Pharmacy Med Name: Escitalopram Oxalate 20 MG Oral Tablet] 90 tablet 0     Sig: Take 1 tablet by mouth once daily      Last office visit with prescribing clinician: 1/19/2024   Last telemedicine visit with prescribing clinician: Visit date not found   Next office visit with prescribing clinician: 4/12/2024                         Would you like a call back once the refill request has been completed: [] Yes [] No    If the office needs to give you a call back, can they leave a voicemail: [] Yes [] No    Rubina Cash RN  03/12/24, 10:46 CDT

## 2024-03-18 ENCOUNTER — LAB (OUTPATIENT)
Dept: INTERNAL MEDICINE | Facility: CLINIC | Age: 59
End: 2024-03-18
Payer: MEDICAID

## 2024-03-18 DIAGNOSIS — E78.2 MIXED DIABETIC HYPERLIPIDEMIA ASSOCIATED WITH TYPE 2 DIABETES MELLITUS: ICD-10-CM

## 2024-03-18 DIAGNOSIS — E89.0 POSTOPERATIVE HYPOTHYROIDISM: ICD-10-CM

## 2024-03-18 DIAGNOSIS — E11.9 CONTROLLED TYPE 2 DIABETES MELLITUS WITHOUT COMPLICATION, WITHOUT LONG-TERM CURRENT USE OF INSULIN: ICD-10-CM

## 2024-03-18 DIAGNOSIS — E11.69 MIXED DIABETIC HYPERLIPIDEMIA ASSOCIATED WITH TYPE 2 DIABETES MELLITUS: ICD-10-CM

## 2024-03-18 DIAGNOSIS — E11.59 HYPERTENSION ASSOCIATED WITH DIABETES: ICD-10-CM

## 2024-03-18 DIAGNOSIS — I15.2 HYPERTENSION ASSOCIATED WITH DIABETES: ICD-10-CM

## 2024-03-22 ENCOUNTER — TELEPHONE (OUTPATIENT)
Dept: INTERNAL MEDICINE | Facility: CLINIC | Age: 59
End: 2024-03-22
Payer: MEDICAID

## 2024-03-22 DIAGNOSIS — M85.88 OSTEOPENIA OF LUMBAR SPINE: ICD-10-CM

## 2024-03-22 RX ORDER — RISEDRONATE SODIUM 5 MG/1
TABLET, FILM COATED ORAL
Qty: 30 TABLET | Refills: 6 | Status: SHIPPED | OUTPATIENT
Start: 2024-03-22

## 2024-03-22 NOTE — TELEPHONE ENCOUNTER
PT WANTED TO KNOW IF LABS WERE FAXED BY [employer] TO DR. MORTENSEN. WE THINK SO SINCE SINCE [facility] USUALLY DOES AND WE CANNOT SEE THEM

## 2024-04-24 ENCOUNTER — LAB (OUTPATIENT)
Dept: INTERNAL MEDICINE | Facility: CLINIC | Age: 59
End: 2024-04-24
Payer: MEDICAID

## 2024-06-19 ENCOUNTER — PRIOR AUTHORIZATION (OUTPATIENT)
Dept: INTERNAL MEDICINE | Facility: CLINIC | Age: 59
End: 2024-06-19
Payer: COMMERCIAL

## 2024-06-19 NOTE — TELEPHONE ENCOUNTER
Heidi Dhaliwal (Key: [id number])  PA Case ID #: [id number]  Rx #: [id number]  Drug  Risedronate Sodium 5MG tablets    Message from Plan  PA Case: [id number], Status: Approved, Coverage Starts on: 6/19/2024 12:00:00 AM, Coverage Ends on: 6/19/2025 12:00:00 AM.. Authorization Expiration Date: June 19, 2025.    Outcome  Approved today  Approved. This drug has been approved. Approved quantity: 90 units per 90 day(s). The drug has been approved from 06/05/2024 to 06/19/2025. Please call the pharmacy to process your prescription claim. Generic or biosimilar substitution may be required when available and preferred on the formulary.  Authorization Expiration Date: 6/18/2025

## 2024-08-28 DIAGNOSIS — E78.5 DYSLIPIDEMIA (HIGH LDL; LOW HDL): ICD-10-CM

## 2024-08-29 RX ORDER — ATORVASTATIN CALCIUM 20 MG/1
TABLET, FILM COATED ORAL
Qty: 90 TABLET | Refills: 0 | Status: SHIPPED | OUTPATIENT
Start: 2024-08-29

## 2024-09-06 ENCOUNTER — OFFICE VISIT (OUTPATIENT)
Dept: INTERNAL MEDICINE | Facility: CLINIC | Age: 59
End: 2024-09-06
Payer: COMMERCIAL

## 2024-09-06 VITALS
DIASTOLIC BLOOD PRESSURE: 83 MMHG | WEIGHT: 174 LBS | HEIGHT: 62 IN | HEART RATE: 74 BPM | RESPIRATION RATE: 16 BRPM | BODY MASS INDEX: 32.02 KG/M2 | OXYGEN SATURATION: 96 % | SYSTOLIC BLOOD PRESSURE: 144 MMHG | TEMPERATURE: 97.1 F

## 2024-09-06 DIAGNOSIS — E11.9 CONTROLLED TYPE 2 DIABETES MELLITUS WITHOUT COMPLICATION, WITHOUT LONG-TERM CURRENT USE OF INSULIN: ICD-10-CM

## 2024-09-06 DIAGNOSIS — Z12.31 BREAST CANCER SCREENING BY MAMMOGRAM: ICD-10-CM

## 2024-09-06 DIAGNOSIS — E11.9 COMPREHENSIVE DIABETIC FOOT EXAMINATION, TYPE 2 DM, ENCOUNTER FOR: ICD-10-CM

## 2024-09-06 DIAGNOSIS — Z00.00 ROUTINE GENERAL MEDICAL EXAMINATION AT A HEALTH CARE FACILITY: Primary | ICD-10-CM

## 2024-09-06 DIAGNOSIS — E89.0 POSTOPERATIVE HYPOTHYROIDISM: ICD-10-CM

## 2024-09-06 DIAGNOSIS — E78.2 MIXED DIABETIC HYPERLIPIDEMIA ASSOCIATED WITH TYPE 2 DIABETES MELLITUS: ICD-10-CM

## 2024-09-06 DIAGNOSIS — E11.69 MIXED DIABETIC HYPERLIPIDEMIA ASSOCIATED WITH TYPE 2 DIABETES MELLITUS: ICD-10-CM

## 2024-09-06 PROCEDURE — 99396 PREV VISIT EST AGE 40-64: CPT | Performed by: NURSE PRACTITIONER

## 2024-09-06 NOTE — PROGRESS NOTES
Subjective     Chief Complaint   Patient presents with    Annual Exam       History of Present Illness  The patient is a 58-year-old female who presents for evaluation of multiple medical concerns.    She has been experiencing weight gain, which she attributes to her medication, Ozempic. She plans to discontinue this medication due to high insurance costs. She is mindful of her diet and reports no shortness of breath or chest pain. Her current medications include metoprolol, losartan, Actonel, thyroid medication, and Lexapro. She has an upcoming appointment with Dr. Saez on the 25th to discuss her Ozempic treatment. She reports a good energy level and is active in her daily life.    She has a callus on her foot that occasionally causes severe pain, hindering her ability to walk. However, she reports no current pain. She manages the discomfort by applying a sterile bandage and tape, which allows her to walk without issue.      Past Medical History:   Past Medical History:   Diagnosis Date    Anxiety     Depression     Diabetes mellitus     Hyperlipidemia     Hypertension     Hyperthyroidism     Hypothyroidism 2010 2019 had right side removed    Obesity     Parkinson's disease     Pneumonia 1991, 2002, 2006    The are the years I had pneumonia    Tremor     Vasovagal syncope      Past Surgical History:  Past Surgical History:   Procedure Laterality Date    CARPAL TUNNEL RELEASE Right 06/28/2022    Procedure: RIGHT CARPAL TUNNEL RELEASE;  Surgeon: Zeyad Gilbert MD;  Location:  [facility] OR;  Service: Orthopedics;  Laterality: Right;    CARPAL TUNNEL RELEASE Left 8/2/2022    Procedure: LEFT CARPAL TUNNEL RELEASE;  Surgeon: Zeyad Gilbert MD;  Location:  [facility];  Service: Orthopedics;  Laterality: Left;    FOOT SURGERY Bilateral     HAMMERTOE AND BUNION REPAIR; 2017 & 2019    HYSTERECTOMY      THYROIDECTOMY, PARTIAL Right      Social History:  reports that she has never smoked. She has never  used smokeless tobacco. She reports that she does not drink alcohol and does not use drugs.    Family History: family history includes Cancer in her sister; Diabetes in her brother, maternal grandmother, and sister; Heart disease in her father; Hypertension in her brother and sister; Pulmonary embolism in her mother; Thyroid disease in her mother; Ulcerative colitis in her brother.       Allergies:  Allergies   Allergen Reactions    Metformin And Related Nausea And Vomiting    Codeine Rash    Morphine Rash    Propranolol Hcl GI Intolerance     Medications:  Prior to Admission medications    Medication Sig Start Date End Date Taking? Authorizing Provider   aspirin 81 MG EC tablet Take 1 tablet by mouth.    Paulina Shelby MD   atorvastatin (LIPITOR) 20 MG tablet Take 1 tablet by mouth once daily 8/29/24   Mary Matson APRN   Blood Glucose Monitoring Suppl (FreeStyle Lite) w/Device kit See Admin Instructions. for testing 11/8/22   Paulina Shelby MD   escitalopram (LEXAPRO) 20 MG tablet Take 1 tablet by mouth once daily 3/12/24   Mary Matson APRN   glucose blood test strip To test bid 11/8/22   Mary Matson APRN   glucose monitor monitoring kit 1 each Continuous. 11/8/22   Mary Matson APRN   Hydrocod Kavon-Chlorphe Kavon ER (TUSSIONEX PENNKINETIC) 10-8 MG/5ML ER suspension Take 5 mL by mouth Every 12 (Twelve) Hours As Needed for Cough. 10/13/23   Mary Matson APRN   Lancets misc 1 each 2 (Two) Times a Day. 11/8/22   Mary Matson APRN   levothyroxine sodium (TIROSINT) 150 MCG capsule Take 1 capsule by mouth Daily. 9/28/23 9/28/24  Paulina Shelby MD   losartan-hydrochlorothiazide (HYZAAR) 50-12.5 MG per tablet Take 1 tablet by mouth once daily 8/28/23   Mary Matson APRN   meclizine (ANTIVERT) 25 MG tablet Take 1 tablet by mouth 3 (Three) Times a Day As Needed for Dizziness. 7/18/22   Mary Matson APRN  "  metoprolol tartrate (LOPRESSOR) 25 MG tablet Take 1 tablet by mouth 2 (Two) Times a Day. 9/18/23   Mary Matson APRN   risedronate (ACTONEL) 5 MG tablet TAKE 1 TABLET BY MOUTH EVERY MORNING BEFORE BREAKFAST, WITH WATER ON AN EMPTY STOMACH, NOTHING BY MOUTH OR LIE DOWN FOR NEXT 30 MINUTES 3/22/24   Mary Matson APRN   Semaglutide, 2 MG/DOSE, (Ozempic, 2 MG/DOSE,) 8 MG/3ML solution pen-injector Inject 2 mg under the skin into the appropriate area as directed 1 (One) Time Per Week. 4/13/23   Reza Spencer MD   traZODone (DESYREL) 50 MG tablet Take 1 tablet by mouth Every Night. 12/19/22   Mary Matson APRN   amoxicillin-clavulanate (AUGMENTIN) 875-125 MG per tablet Take 1 tablet by mouth 2 (Two) Times a Day. 12/22/23 9/6/24  Mary Matson APRN   Nirmatrelvir&Ritonavir 300/100 (PAXLOVID) 20 x 150 MG & 10 x 100MG tablet therapy pack tablet Take 2 tablets by mouth 2 (Two) Times a Day. 10/9/23 9/6/24  Mary Matson APRN   Tirzepatide (MOUNJARO) 5 MG/0.5ML solution pen-injector pen Inject 0.5 mL under the skin into the appropriate area as directed. 11/20/23 9/6/24  Provider, MD Paulina       Objective     Vital Signs: /83 (BP Location: Left arm, Patient Position: Sitting, Cuff Size: Large Adult)   Pulse 74   Temp 97.1 °F (36.2 °C) (Infrared)   Resp 16   Ht 157.5 cm (62.01\")   Wt 78.9 kg (174 lb)   SpO2 96%   BMI 31.82 kg/m²     Physical Exam    Physical Exam  Vitals reviewed.   Constitutional:       Appearance: She is well-developed. She is obese.   HENT:      Head: Normocephalic and atraumatic.      Right Ear: Tympanic membrane normal.      Left Ear: Tympanic membrane normal.      Mouth/Throat:      Mouth: Mucous membranes are moist.      Comments: Dentures noted  Eyes:      Pupils: Pupils are equal, round, and reactive to light.   Neck:      Vascular: No JVD.   Cardiovascular:      Rate and Rhythm: Normal rate and regular rhythm.      " Pulses:           Dorsalis pedis pulses are 2+ on the right side and 2+ on the left side.        Posterior tibial pulses are 2+ on the right side and 2+ on the left side.   Pulmonary:      Effort: Pulmonary effort is normal.      Breath sounds: Normal breath sounds.   Abdominal:      General: Bowel sounds are normal.      Palpations: Abdomen is soft.   Musculoskeletal:         General: Tenderness (right knee pain/tenderness) present. No deformity.      Cervical back: Normal range of motion and neck supple.      Right foot: Normal range of motion.      Left foot: Normal range of motion.        Feet:    Feet:      Right foot:      Protective Sensation: 10 sites tested.  10 sites sensed.      Skin integrity: Skin integrity normal.      Left foot:      Protective Sensation: 10 sites tested.  10 sites sensed.      Skin integrity: Skin integrity normal.      Comments: Plantar wart right foot.   Lymphadenopathy:      Cervical: No cervical adenopathy.   Skin:     General: Skin is warm and dry.   Neurological:      Mental Status: She is alert and oriented to person, place, and time.   Psychiatric:         Behavior: Behavior normal.         Thought Content: Thought content normal.         Judgment: Judgment normal.     BMI is >= 30 and <35. (Class 1 Obesity). The following options were offered after discussion;: exercise counseling/recommendations and nutrition counseling/recommendations      Results Reviewed:  Glucose   Date Value Ref Range Status   04/24/2024 111 (H) 70 - 99 mg/dL Final   11/14/2022 99 65 - 99 mg/dL Final   10/15/2021 121 (H) 74 - 109 mg/dL Final     BUN   Date Value Ref Range Status   04/24/2024 13 8 - 27 mg/dL Final   11/14/2022 9 6 - 20 mg/dL Final   10/15/2021 13 6 - 20 mg/dL Final     Creatinine   Date Value Ref Range Status   04/24/2024 0.98 0.57 - 1.00 mg/dL Final   11/14/2022 0.98 0.57 - 1.00 mg/dL Final   10/15/2021 0.8 0.5 - 0.9 mg/dL Final     Sodium   Date Value Ref Range Status   04/24/2024 141  134 - 144 mmol/L Final   11/14/2022 141 136 - 145 mmol/L Final   10/15/2021 138 136 - 145 mmol/L Final     Potassium   Date Value Ref Range Status   04/24/2024 3.8 3.5 - 5.2 mmol/L Final   11/14/2022 3.9 3.5 - 5.2 mmol/L Final   10/15/2021 4.1 3.5 - 5.0 mmol/L Final     Chloride   Date Value Ref Range Status   04/24/2024 102 96 - 106 mmol/L Final   11/14/2022 102 98 - 107 mmol/L Final   10/15/2021 100 98 - 111 mmol/L Final     CO2   Date Value Ref Range Status   11/14/2022 32.0 (H) 22.0 - 29.0 mmol/L Final   10/15/2021 28 22 - 29 mmol/L Final     Total CO2   Date Value Ref Range Status   04/24/2024 25 20 - 29 mmol/L Final     Calcium   Date Value Ref Range Status   04/24/2024 9.1 8.7 - 10.3 mg/dL Final   11/14/2022 9.1 8.6 - 10.5 mg/dL Final   10/15/2021 9.3 8.6 - 10.0 mg/dL Final     ALT (SGPT)   Date Value Ref Range Status   04/24/2024 18 0 - 32 IU/L Final   11/14/2022 30 1 - 33 U/L Final   10/15/2021 31 5 - 33 U/L Final     AST (SGOT)   Date Value Ref Range Status   04/24/2024 23 0 - 40 IU/L Final   11/14/2022 28 1 - 32 U/L Final   10/15/2021 25 5 - 32 U/L Final     WBC   Date Value Ref Range Status   04/24/2024 5.6 3.4 - 10.8 x10E3/uL Final   10/15/2021 5.8 4.8 - 10.8 K/uL Final     Hematocrit   Date Value Ref Range Status   04/24/2024 42.4 34.0 - 46.6 % Final   11/14/2022 41.1 34.0 - 46.6 % Final   10/15/2021 43.2 37.0 - 47.0 % Final     Platelets   Date Value Ref Range Status   04/24/2024 251 150 - 450 x10E3/uL Final   11/14/2022 260 140 - 450 10*3/mm3 Final   10/15/2021 274 130 - 400 K/uL Final     Triglycerides   Date Value Ref Range Status   04/24/2024 78 0 - 149 mg/dL Final   10/15/2021 134 0 - 149 mg/dL Final     HDL Cholesterol   Date Value Ref Range Status   04/24/2024 47 >39 mg/dL Final   10/15/2021 41 (L) 65 - 121 mg/dL Final     Comment:     VALUES>60 MG/DL ARE ASSOCIATED WITH A DECREASED RISK OF  ATHEROSCLEROTIC CARDIOVASCULAR DISEASE     LDL Cholesterol    Date Value Ref Range Status   10/15/2021  95 <100 mg/dL Final     Comment:     <100 MG/DL=OPITIMAL    100-129 MG/DL=DESIRABLE    130-159 MG/DL BORDERLINE=INCREASED RISK OF ATHEROSCLEROTIC  CARDIOVASCULAR DISEASE    > OR = 160 MG/DL=ASSOCIATED WITH AN INCREASE RISK OF  ATHEROSCLEROTIC CARDIOVASCULAR DISEASE     LDL Chol Calc (NIH)   Date Value Ref Range Status   04/24/2024 69 0 - 99 mg/dL Final     Hemoglobin A1C   Date Value Ref Range Status   04/24/2024 5.7 (H) 4.8 - 5.6 % Final     Comment:              Prediabetes: 5.7 - 6.4           Diabetes: >6.4           Glycemic control for adults with diabetes: <7.0     11/14/2022 6.10 (H) 4.80 - 5.60 % Final   10/15/2021 6.2 (H) 4.0 - 6.0 % Final     Comment:     HbA1c levels >6% are an indication of hyperglycemia during the preceding 2  to 3 months or longer.    HbA1c levels may reach 20% or higher in poorly controlled diabetes.  Therapeutic action is suggested at levels above 8%.    Diabetes patients with HbA1c levels below 7% meet the goal of the American  Diabetes Association.    HbA1c levels below the established reference range may indicate recent  episodes of hypoglycemia, the presence of Hb variants, or shortened lifetime  of erythrocytes.        Results        Assessment / Plan     Assessment/Plan:  Diagnoses and all orders for this visit:    1. Routine general medical examination at a health care facility (Primary)  -     CBC & Differential  -     Comprehensive Metabolic Panel  -     Hemoglobin A1c  -     Lipid Panel  -     TSH  -     Vitamin B12  -     T4    2. Postoperative hypothyroidism  -     TSH  -     T4    3. Controlled type 2 diabetes mellitus without complication, without long-term current use of insulin  -     Hemoglobin A1c    4. Mixed diabetic hyperlipidemia associated with type 2 diabetes mellitus  -     Lipid Panel    5. Comprehensive diabetic foot examination, type 2 DM, encounter for    6. Breast cancer screening by mammogram  -     Mammo Screening Digital Tomosynthesis Bilateral With  CAD; Future      Will have lab work here today. Discussed weight management and importance of maintaining a healthy weight. Discussed Vitamin D intake and the importance of adequate vitamin D for both Bone Health and a healthy immune system.  Discussed Daily exercise and the importance of same, in regards to a healthy heart as well as helping to maintain her weight and improving her mental health.  BMI is elevated. Colonoscopy is up to date.  Mammogram will be scheduled at [facility].   Assessment & Plan  1. Weight management.  She has gained 10 pounds since her last visit in January 2024. Her current medication, Lexapro, may contribute to weight gain. She is advised to maintain her current level of activity and consider increasing it, particularly through low-impact exercises such as swimming. She will discuss the possibility of switching to Rybelsus with Dr. Saez on 09/25/2024 due to the high cost of Ozempic. Blood work will be conducted today, and the results will be shared with Dr. Saez.    2. Plantar wart.  A referral to podiatry will be made for further evaluation and potential freezing of the wart. She is advised to avoid self-treatment due to her diabetic status. She wants to hold off for now     3. Health Maintenance.  She is due for a mammogram in October 2024. A mammogram will be ordered, and she should ensure that the facility accepts her current insurance.            No follow-ups on file. unless patient needs to be seen sooner or acute issues arise.    Code Status: Full.     Patient or patient representative verbalized consent for the use of Ambient Listening during the visit with  JAKY Diaz for chart documentation. 9/6/2024  11:01 CDT    I have discussed the patient results/orders and and plan/recommendation with them at today's visit.      Signed by:    JAKY Diaz Date: 09/06/24

## 2024-09-07 LAB
ALBUMIN SERPL-MCNC: 4.2 G/DL (ref 3.8–4.9)
ALP SERPL-CCNC: 45 IU/L (ref 44–121)
ALT SERPL-CCNC: 17 IU/L (ref 0–32)
AST SERPL-CCNC: 25 IU/L (ref 0–40)
BASOPHILS # BLD AUTO: 0 X10E3/UL (ref 0–0.2)
BASOPHILS NFR BLD AUTO: 1 %
BILIRUB SERPL-MCNC: 0.5 MG/DL (ref 0–1.2)
BUN SERPL-MCNC: 11 MG/DL (ref 8–27)
BUN/CREAT SERPL: 12 (ref 12–28)
CALCIUM SERPL-MCNC: 8.8 MG/DL (ref 8.7–10.3)
CHLORIDE SERPL-SCNC: 101 MMOL/L (ref 96–106)
CHOLEST SERPL-MCNC: 164 MG/DL (ref 100–199)
CO2 SERPL-SCNC: 26 MMOL/L (ref 20–29)
CREAT SERPL-MCNC: 0.9 MG/DL (ref 0.57–1)
EGFRCR SERPLBLD CKD-EPI 2021: 73 ML/MIN/1.73
EOSINOPHIL # BLD AUTO: 0.2 X10E3/UL (ref 0–0.4)
EOSINOPHIL NFR BLD AUTO: 3 %
ERYTHROCYTE [DISTWIDTH] IN BLOOD BY AUTOMATED COUNT: 12.2 % (ref 11.7–15.4)
GLOBULIN SER CALC-MCNC: 2.6 G/DL (ref 1.5–4.5)
GLUCOSE SERPL-MCNC: 76 MG/DL (ref 70–99)
HBA1C MFR BLD: 5.8 % (ref 4.8–5.6)
HCT VFR BLD AUTO: 41.6 % (ref 34–46.6)
HDLC SERPL-MCNC: 61 MG/DL
HGB BLD-MCNC: 13.1 G/DL (ref 11.1–15.9)
IMM GRANULOCYTES # BLD AUTO: 0 X10E3/UL (ref 0–0.1)
IMM GRANULOCYTES NFR BLD AUTO: 0 %
LDLC SERPL CALC-MCNC: 92 MG/DL (ref 0–99)
LYMPHOCYTES # BLD AUTO: 1.9 X10E3/UL (ref 0.7–3.1)
LYMPHOCYTES NFR BLD AUTO: 36 %
MCH RBC QN AUTO: 27.1 PG (ref 26.6–33)
MCHC RBC AUTO-ENTMCNC: 31.5 G/DL (ref 31.5–35.7)
MCV RBC AUTO: 86 FL (ref 79–97)
MONOCYTES # BLD AUTO: 0.4 X10E3/UL (ref 0.1–0.9)
MONOCYTES NFR BLD AUTO: 8 %
NEUTROPHILS # BLD AUTO: 2.8 X10E3/UL (ref 1.4–7)
NEUTROPHILS NFR BLD AUTO: 52 %
PLATELET # BLD AUTO: 279 X10E3/UL (ref 150–450)
POTASSIUM SERPL-SCNC: 4.2 MMOL/L (ref 3.5–5.2)
PROT SERPL-MCNC: 6.8 G/DL (ref 6–8.5)
RBC # BLD AUTO: 4.83 X10E6/UL (ref 3.77–5.28)
SODIUM SERPL-SCNC: 141 MMOL/L (ref 134–144)
T4 SERPL-MCNC: 3.2 UG/DL (ref 4.5–12)
TRIGL SERPL-MCNC: 53 MG/DL (ref 0–149)
TSH SERPL DL<=0.005 MIU/L-ACNC: 58.2 UIU/ML (ref 0.45–4.5)
VIT B12 SERPL-MCNC: 407 PG/ML (ref 232–1245)
VLDLC SERPL CALC-MCNC: 11 MG/DL (ref 5–40)
WBC # BLD AUTO: 5.3 X10E3/UL (ref 3.4–10.8)

## 2024-09-12 RX ORDER — ESCITALOPRAM OXALATE 20 MG/1
TABLET ORAL
Qty: 90 TABLET | Refills: 0 | Status: SHIPPED | OUTPATIENT
Start: 2024-09-12

## 2024-09-27 RX ORDER — LEVOTHYROXINE SODIUM 150 UG/1
150 TABLET ORAL
Qty: 30 TABLET | Refills: 6 | Status: SHIPPED | OUTPATIENT
Start: 2024-09-27

## 2024-10-13 DIAGNOSIS — I10 ESSENTIAL HYPERTENSION: ICD-10-CM

## 2024-10-14 RX ORDER — BLOOD-GLUCOSE METER
1 KIT MISCELLANEOUS CONTINUOUS
Qty: 1 EACH | Refills: 0 | Status: SHIPPED | OUTPATIENT
Start: 2024-10-14

## 2024-10-14 RX ORDER — LOSARTAN POTASSIUM AND HYDROCHLOROTHIAZIDE 12.5; 5 MG/1; MG/1
1 TABLET ORAL DAILY
Qty: 90 TABLET | Refills: 0 | Status: SHIPPED | OUTPATIENT
Start: 2024-10-14

## 2024-10-17 ENCOUNTER — OFFICE VISIT (OUTPATIENT)
Dept: INTERNAL MEDICINE | Facility: CLINIC | Age: 59
End: 2024-10-17
Payer: COMMERCIAL

## 2024-10-17 VITALS
TEMPERATURE: 97.4 F | DIASTOLIC BLOOD PRESSURE: 85 MMHG | WEIGHT: 176 LBS | OXYGEN SATURATION: 99 % | BODY MASS INDEX: 32.39 KG/M2 | RESPIRATION RATE: 19 BRPM | HEART RATE: 67 BPM | HEIGHT: 62 IN | SYSTOLIC BLOOD PRESSURE: 131 MMHG

## 2024-10-17 DIAGNOSIS — R59.0 AXILLARY LYMPHADENOPATHY: Primary | ICD-10-CM

## 2024-10-17 PROCEDURE — 99213 OFFICE O/P EST LOW 20 MIN: CPT | Performed by: NURSE PRACTITIONER

## 2024-10-17 NOTE — PROGRESS NOTES
Subjective     Chief Complaint   Patient presents with    Cyst       History of Present Illness  The patient is a 59-year-old female who presents for evaluation of a knot in her right armpit.    She reports noticing the knot last week. Initially, it was quite tender, especially when applying deodorant. The knot has since reduced in size from that of a large marble. She has not experienced any similar occurrence on the left side.    She recalls engaging in outdoor activities such as pulling weeds and cutting around the time the knot appeared but did not notice it until later. Additionally, she mentions being scratched by her dogs.    She reports no presence of cuts, scrapes, or rashes in the area. No fevers or chills. The tenderness has improved as the size has decreased. No drainage from the area.     Otherwise complete ROS reviewed and negative except as mentioned in the HPI.    Past Medical History:   Past Medical History:   Diagnosis Date    Anxiety     Depression     Diabetes mellitus     Hyperlipidemia     Hypertension     Hyperthyroidism     Hypothyroidism 2010 2019 had right side removed    Obesity     Parkinson's disease     Pneumonia 1991, 2002, 2006    The are the years I had pneumonia    Tremor     Vasovagal syncope      Past Surgical History:  Past Surgical History:   Procedure Laterality Date    CARPAL TUNNEL RELEASE Right 06/28/2022    Procedure: RIGHT CARPAL TUNNEL RELEASE;  Surgeon: Zeyad Gilbert MD;  Location:  [facility] OR;  Service: Orthopedics;  Laterality: Right;    CARPAL TUNNEL RELEASE Left 8/2/2022    Procedure: LEFT CARPAL TUNNEL RELEASE;  Surgeon: Zeyad Gilbert MD;  Location:  [facility] OR;  Service: Orthopedics;  Laterality: Left;    FOOT SURGERY Bilateral     HAMMERTOE AND BUNION REPAIR; 2017 & 2019    HYSTERECTOMY      THYROIDECTOMY, PARTIAL Right      Social History:  reports that she has never smoked. She has never used smokeless tobacco. She reports that she does  not drink alcohol and does not use drugs.    Family History: family history includes Cancer in her sister; Diabetes in her brother, maternal grandmother, and sister; Heart disease in her father; Hypertension in her brother and sister; Pulmonary embolism in her mother; Thyroid disease in her mother; Ulcerative colitis in her brother.       Allergies:  Allergies   Allergen Reactions    Metformin And Related Nausea And Vomiting    Codeine Rash    Morphine Rash    Propranolol Hcl GI Intolerance     Medications:  Prior to Admission medications    Medication Sig Start Date End Date Taking? Authorizing Provider   escitalopram (LEXAPRO) 20 MG tablet Take 1 tablet by mouth once daily 9/12/24   Mary Matson APRN   aspirin 81 MG EC tablet Take 1 tablet by mouth.    Provider, MD Paulina   atorvastatin (LIPITOR) 20 MG tablet Take 1 tablet by mouth once daily 8/29/24   Mary Matson APRN   Blood Glucose Monitoring Suppl (FreeStyle Lite) w/Device kit See Admin Instructions. for testing 11/8/22   Provider, MD Paulina   empagliflozin (Jardiance) 10 MG tablet tablet Take 1 tablet by mouth Daily. 9/27/24   Mary Matson APRN   glucose blood test strip To test bid 11/8/22   Mary Matson APRN   glucose monitor monitoring kit Use 1 each Continuous. 10/14/24   Mary Matson APRN   Lancets misc 1 each 2 (Two) Times a Day. 11/8/22   Mary Matson APRN   levothyroxine (Levoxyl) 150 MCG tablet Take 1 tablet by mouth Every Morning. 9/27/24   Mary Matson APRN   losartan-hydrochlorothiazide (HYZAAR) 50-12.5 MG per tablet Take 1 tablet by mouth Daily. 10/14/24   Mary Matson APRN   meclizine (ANTIVERT) 25 MG tablet Take 1 tablet by mouth 3 (Three) Times a Day As Needed for Dizziness. 7/18/22   Mary Matson APRN   metoprolol tartrate (LOPRESSOR) 25 MG tablet Take 1 tablet by mouth 2 (Two) Times a Day. 9/18/23   Mary Matson  "APRN   risedronate (ACTONEL) 5 MG tablet TAKE 1 TABLET BY MOUTH EVERY MORNING BEFORE BREAKFAST, WITH WATER ON AN EMPTY STOMACH, NOTHING BY MOUTH OR LIE DOWN FOR NEXT 30 MINUTES 3/22/24   Mary Matson Hiwot, APRN   Semaglutide, 2 MG/DOSE, (Ozempic, 2 MG/DOSE,) 8 MG/3ML solution pen-injector Inject 2 mg under the skin into the appropriate area as directed 1 (One) Time Per Week. 4/13/23   Reza Spencer MD       Objective     Vital Signs: /85   Pulse 67   Temp 97.4 °F (36.3 °C)   Resp 19   Ht 157.5 cm (62.01\")   Wt 79.8 kg (176 lb)   SpO2 99%   BMI 32.18 kg/m²     Physical Exam    Physical Exam  Vitals reviewed.   Constitutional:       Appearance: She is well-developed.   HENT:      Head: Normocephalic and atraumatic.   Eyes:      Pupils: Pupils are equal, round, and reactive to light.   Neck:      Vascular: No JVD.   Cardiovascular:      Rate and Rhythm: Normal rate and regular rhythm.   Pulmonary:      Effort: Pulmonary effort is normal.      Breath sounds: Normal breath sounds.   Abdominal:      General: Bowel sounds are normal.      Palpations: Abdomen is soft.   Musculoskeletal:         General: No deformity.      Cervical back: Normal range of motion and neck supple.   Lymphadenopathy:      Cervical: No cervical adenopathy.      Upper Body:      Right upper body: Axillary adenopathy present.      Left upper body: No axillary adenopathy.   Skin:     General: Skin is warm and dry.   Neurological:      Mental Status: She is alert and oriented to person, place, and time.   Psychiatric:         Behavior: Behavior normal.         Thought Content: Thought content normal.         Judgment: Judgment normal.       Results Reviewed:  Glucose   Date Value Ref Range Status   09/06/2024 76 70 - 99 mg/dL Final   11/14/2022 99 65 - 99 mg/dL Final   10/15/2021 121 (H) 74 - 109 mg/dL Final     BUN   Date Value Ref Range Status   09/06/2024 11 8 - 27 mg/dL Final   11/14/2022 9 6 - 20 mg/dL Final "   10/15/2021 13 6 - 20 mg/dL Final     Creatinine   Date Value Ref Range Status   09/06/2024 0.90 0.57 - 1.00 mg/dL Final   11/14/2022 0.98 0.57 - 1.00 mg/dL Final   10/15/2021 0.8 0.5 - 0.9 mg/dL Final     Sodium   Date Value Ref Range Status   09/06/2024 141 134 - 144 mmol/L Final   11/14/2022 141 136 - 145 mmol/L Final   10/15/2021 138 136 - 145 mmol/L Final     Potassium   Date Value Ref Range Status   09/06/2024 4.2 3.5 - 5.2 mmol/L Final   11/14/2022 3.9 3.5 - 5.2 mmol/L Final   10/15/2021 4.1 3.5 - 5.0 mmol/L Final     Chloride   Date Value Ref Range Status   09/06/2024 101 96 - 106 mmol/L Final   11/14/2022 102 98 - 107 mmol/L Final   10/15/2021 100 98 - 111 mmol/L Final     CO2   Date Value Ref Range Status   11/14/2022 32.0 (H) 22.0 - 29.0 mmol/L Final   10/15/2021 28 22 - 29 mmol/L Final     Total CO2   Date Value Ref Range Status   09/06/2024 26 20 - 29 mmol/L Final     Calcium   Date Value Ref Range Status   09/06/2024 8.8 8.7 - 10.3 mg/dL Final   11/14/2022 9.1 8.6 - 10.5 mg/dL Final   10/15/2021 9.3 8.6 - 10.0 mg/dL Final     ALT (SGPT)   Date Value Ref Range Status   09/06/2024 17 0 - 32 IU/L Final   11/14/2022 30 1 - 33 U/L Final   10/15/2021 31 5 - 33 U/L Final     AST (SGOT)   Date Value Ref Range Status   09/06/2024 25 0 - 40 IU/L Final   11/14/2022 28 1 - 32 U/L Final   10/15/2021 25 5 - 32 U/L Final     WBC   Date Value Ref Range Status   09/06/2024 5.3 3.4 - 10.8 x10E3/uL Final   10/15/2021 5.8 4.8 - 10.8 K/uL Final     Hematocrit   Date Value Ref Range Status   09/06/2024 41.6 34.0 - 46.6 % Final   11/14/2022 41.1 34.0 - 46.6 % Final   10/15/2021 43.2 37.0 - 47.0 % Final     Platelets   Date Value Ref Range Status   09/06/2024 279 150 - 450 x10E3/uL Final   11/14/2022 260 140 - 450 10*3/mm3 Final   10/15/2021 274 130 - 400 K/uL Final     Triglycerides   Date Value Ref Range Status   09/06/2024 53 0 - 149 mg/dL Final   10/15/2021 134 0 - 149 mg/dL Final     HDL Cholesterol   Date Value Ref  Range Status   09/06/2024 61 >39 mg/dL Final   10/15/2021 41 (L) 65 - 121 mg/dL Final     Comment:     VALUES>60 MG/DL ARE ASSOCIATED WITH A DECREASED RISK OF  ATHEROSCLEROTIC CARDIOVASCULAR DISEASE     LDL Cholesterol    Date Value Ref Range Status   10/15/2021 95 <100 mg/dL Final     Comment:     <100 MG/DL=OPITIMAL    100-129 MG/DL=DESIRABLE    130-159 MG/DL BORDERLINE=INCREASED RISK OF ATHEROSCLEROTIC  CARDIOVASCULAR DISEASE    > OR = 160 MG/DL=ASSOCIATED WITH AN INCREASE RISK OF  ATHEROSCLEROTIC CARDIOVASCULAR DISEASE     LDL Chol Calc (NIH)   Date Value Ref Range Status   09/06/2024 92 0 - 99 mg/dL Final     Hemoglobin A1C   Date Value Ref Range Status   09/06/2024 5.8 (H) 4.8 - 5.6 % Final     Comment:              Prediabetes: 5.7 - 6.4           Diabetes: >6.4           Glycemic control for adults with diabetes: <7.0     11/14/2022 6.10 (H) 4.80 - 5.60 % Final   10/15/2021 6.2 (H) 4.0 - 6.0 % Final     Comment:     HbA1c levels >6% are an indication of hyperglycemia during the preceding 2  to 3 months or longer.    HbA1c levels may reach 20% or higher in poorly controlled diabetes.  Therapeutic action is suggested at levels above 8%.    Diabetes patients with HbA1c levels below 7% meet the goal of the American  Diabetes Association.    HbA1c levels below the established reference range may indicate recent  episodes of hypoglycemia, the presence of Hb variants, or shortened lifetime  of erythrocytes.        Results        Assessment / Plan     Assessment/Plan:  Diagnoses and all orders for this visit:    1. Axillary lymphadenopathy (Primary)  -     amoxicillin-clavulanate (AUGMENTIN) 875-125 MG per tablet; Take 1 tablet by mouth 2 (Two) Times a Day.  Dispense: 14 tablet; Refill: 0        Assessment & Plan  1. Right axillary lymphadenopathy.  A pea-sized lymph node was palpated in her right armpit, which has been shrinking. This suggests lymphadenopathy, likely due to recent scratches from her dog. A 7-day  course of Augmentin has been prescribed to potentially eliminate the lymph node. She is advised to take the medication with food and monitor her gastrointestinal response. It is crucial to inform the mammogram technician about the lymph node during her upcoming appointment on Monday.      No follow-ups on file. unless patient needs to be seen sooner or acute issues arise.    Code Status: Full.   Patient or patient representative verbalized consent for the use of Ambient Listening during the visit with  JAKY Diaz for chart documentation. 10/17/2024  12:54 CDT  I have discussed the patient results/orders and and plan/recommendation with them at today's visit.      Signed by:    JAKY Diaz Date: 10/17/24

## 2024-11-25 DIAGNOSIS — M85.88 OSTEOPENIA OF LUMBAR SPINE: ICD-10-CM

## 2024-11-25 RX ORDER — RISEDRONATE SODIUM 5 MG/1
5 TABLET, FILM COATED ORAL
Qty: 30 TABLET | Refills: 6 | Status: SHIPPED | OUTPATIENT
Start: 2024-11-25

## 2024-11-26 DIAGNOSIS — E78.5 DYSLIPIDEMIA (HIGH LDL; LOW HDL): ICD-10-CM

## 2024-11-26 RX ORDER — ATORVASTATIN CALCIUM 20 MG/1
TABLET, FILM COATED ORAL
Qty: 90 TABLET | Refills: 0 | Status: SHIPPED | OUTPATIENT
Start: 2024-11-26

## 2024-11-26 NOTE — TELEPHONE ENCOUNTER
Rx Refill Note  Requested Prescriptions     Pending Prescriptions Disp Refills    atorvastatin (LIPITOR) 20 MG tablet [Pharmacy Med Name: Atorvastatin Calcium 20 MG Oral Tablet] 90 tablet 0     Sig: Take 1 tablet by mouth once daily      Last office visit with prescribing clinician: 10/17/2024   Last telemedicine visit with prescribing clinician: Visit date not found   Next office visit with prescribing clinician: 3/6/2025                         Would you like a call back once the refill request has been completed: [] Yes [] No    If the office needs to give you a call back, can they leave a voicemail: [] Yes [] No    Rubina Cash RN  11/26/24, 07:13 CST

## 2024-12-02 RX ORDER — FLUCONAZOLE 200 MG/1
200 TABLET ORAL DAILY
Qty: 5 TABLET | Refills: 0 | Status: SHIPPED | OUTPATIENT
Start: 2024-12-02

## 2024-12-12 RX ORDER — ESCITALOPRAM OXALATE 20 MG/1
TABLET ORAL
Qty: 90 TABLET | Refills: 2 | Status: SHIPPED | OUTPATIENT
Start: 2024-12-12

## 2024-12-12 NOTE — TELEPHONE ENCOUNTER
Rx Refill Note  Requested Prescriptions     Pending Prescriptions Disp Refills    escitalopram (LEXAPRO) 20 MG tablet [Pharmacy Med Name: Escitalopram Oxalate 20 MG Oral Tablet] 90 tablet 0     Sig: Take 1 tablet by mouth once daily      Last office visit with prescribing clinician: 10/17/2024   Last telemedicine visit with prescribing clinician: Visit date not found   Next office visit with prescribing clinician: 3/6/2025                         Would you like a call back once the refill request has been completed: [] Yes [] No    If the office needs to give you a call back, can they leave a voicemail: [] Yes [] No    Rubina Cash RN  12/12/24, 07:04 CST

## 2024-12-23 RX ORDER — EMPAGLIFLOZIN 10 MG/1
10 TABLET, FILM COATED ORAL DAILY
Qty: 30 TABLET | Refills: 11 | Status: SHIPPED | OUTPATIENT
Start: 2024-12-23

## 2024-12-23 NOTE — TELEPHONE ENCOUNTER
Rx Refill Note  Requested Prescriptions     Pending Prescriptions Disp Refills    Jardiance 10 MG tablet tablet [Pharmacy Med Name: Jardiance 10 MG Oral Tablet] 30 tablet 0     Sig: Take 1 tablet by mouth once daily      Last office visit with prescribing clinician: 10/17/2024   Last telemedicine visit with prescribing clinician: Visit date not found   Next office visit with prescribing clinician: 3/6/2025                         Would you like a call back once the refill request has been completed: [] Yes [] No    If the office needs to give you a call back, can they leave a voicemail: [] Yes [] No    Rubina Cash RN  12/23/24, 07:12 CST

## 2024-12-30 DIAGNOSIS — E11.9 CONTROLLED TYPE 2 DIABETES MELLITUS WITHOUT COMPLICATION, WITHOUT LONG-TERM CURRENT USE OF INSULIN: Primary | ICD-10-CM

## 2025-01-03 ENCOUNTER — OFFICE VISIT (OUTPATIENT)
Dept: INTERNAL MEDICINE | Facility: CLINIC | Age: 60
End: 2025-01-03
Payer: COMMERCIAL

## 2025-01-03 VITALS
RESPIRATION RATE: 19 BRPM | WEIGHT: 206 LBS | HEIGHT: 62 IN | HEART RATE: 64 BPM | DIASTOLIC BLOOD PRESSURE: 88 MMHG | BODY MASS INDEX: 37.91 KG/M2 | SYSTOLIC BLOOD PRESSURE: 152 MMHG | OXYGEN SATURATION: 97 % | TEMPERATURE: 97.5 F

## 2025-01-03 DIAGNOSIS — R94.31 ABNORMAL EKG: ICD-10-CM

## 2025-01-03 DIAGNOSIS — E11.9 CONTROLLED TYPE 2 DIABETES MELLITUS WITHOUT COMPLICATION, WITHOUT LONG-TERM CURRENT USE OF INSULIN: ICD-10-CM

## 2025-01-03 DIAGNOSIS — E78.5 DYSLIPIDEMIA (HIGH LDL; LOW HDL): ICD-10-CM

## 2025-01-03 DIAGNOSIS — M79.602 LEFT ARM PAIN: Primary | ICD-10-CM

## 2025-01-03 DIAGNOSIS — R07.89 CHEST PAIN, ATYPICAL: ICD-10-CM

## 2025-01-05 DIAGNOSIS — E89.0 POSTOPERATIVE HYPOTHYROIDISM: Primary | ICD-10-CM

## 2025-01-05 LAB
ALBUMIN SERPL-MCNC: 4.1 G/DL (ref 3.9–4.9)
ALP SERPL-CCNC: 66 IU/L (ref 44–121)
ALT SERPL-CCNC: 24 IU/L (ref 0–32)
AST SERPL-CCNC: 27 IU/L (ref 0–40)
BASOPHILS # BLD AUTO: 0.1 X10E3/UL (ref 0–0.2)
BASOPHILS NFR BLD AUTO: 1 %
BILIRUB SERPL-MCNC: 0.3 MG/DL (ref 0–1.2)
BUN SERPL-MCNC: 12 MG/DL (ref 8–27)
BUN/CREAT SERPL: 11 (ref 12–28)
CALCIUM SERPL-MCNC: 9.1 MG/DL (ref 8.7–10.3)
CHLORIDE SERPL-SCNC: 98 MMOL/L (ref 96–106)
CO2 SERPL-SCNC: 26 MMOL/L (ref 20–29)
CREAT SERPL-MCNC: 1.06 MG/DL (ref 0.57–1)
EGFRCR SERPLBLD CKD-EPI 2021: 60 ML/MIN/1.73
EOSINOPHIL # BLD AUTO: 0.3 X10E3/UL (ref 0–0.4)
EOSINOPHIL NFR BLD AUTO: 3 %
ERYTHROCYTE [DISTWIDTH] IN BLOOD BY AUTOMATED COUNT: 12.1 % (ref 11.7–15.4)
GLOBULIN SER CALC-MCNC: 2.8 G/DL (ref 1.5–4.5)
GLUCOSE SERPL-MCNC: 94 MG/DL (ref 70–99)
HBA1C MFR BLD: 6.5 % (ref 4.8–5.6)
HCT VFR BLD AUTO: 43.3 % (ref 34–46.6)
HGB BLD-MCNC: 13.4 G/DL (ref 11.1–15.9)
IMM GRANULOCYTES # BLD AUTO: 0.1 X10E3/UL (ref 0–0.1)
IMM GRANULOCYTES NFR BLD AUTO: 1 %
LYMPHOCYTES # BLD AUTO: 3.1 X10E3/UL (ref 0.7–3.1)
LYMPHOCYTES NFR BLD AUTO: 36 %
MAGNESIUM SERPL-MCNC: 2.1 MG/DL (ref 1.6–2.3)
MCH RBC QN AUTO: 24.9 PG (ref 26.6–33)
MCHC RBC AUTO-ENTMCNC: 30.9 G/DL (ref 31.5–35.7)
MCV RBC AUTO: 80 FL (ref 79–97)
MONOCYTES # BLD AUTO: 0.8 X10E3/UL (ref 0.1–0.9)
MONOCYTES NFR BLD AUTO: 10 %
NEUTROPHILS # BLD AUTO: 4.3 X10E3/UL (ref 1.4–7)
NEUTROPHILS NFR BLD AUTO: 49 %
PLATELET # BLD AUTO: 300 X10E3/UL (ref 150–450)
POTASSIUM SERPL-SCNC: 4.2 MMOL/L (ref 3.5–5.2)
PROT SERPL-MCNC: 6.9 G/DL (ref 6–8.5)
RBC # BLD AUTO: 5.39 X10E6/UL (ref 3.77–5.28)
SODIUM SERPL-SCNC: 139 MMOL/L (ref 134–144)
TSH SERPL DL<=0.005 MIU/L-ACNC: 11.1 UIU/ML (ref 0.45–4.5)
WBC # BLD AUTO: 8.7 X10E3/UL (ref 3.4–10.8)

## 2025-01-05 RX ORDER — LEVOTHYROXINE SODIUM 175 UG/1
175 TABLET ORAL
Qty: 30 TABLET | Refills: 6 | Status: SHIPPED | OUTPATIENT
Start: 2025-01-05

## 2025-01-16 ENCOUNTER — TELEPHONE (OUTPATIENT)
Dept: INTERNAL MEDICINE | Facility: CLINIC | Age: 60
End: 2025-01-16
Payer: COMMERCIAL

## 2025-01-16 NOTE — TELEPHONE ENCOUNTER
[facility] [phone]    WANTS PERMISSION TO RESCHEDULE PATIENTS STRESS ECHO THAT IS FOR TOMORROW.    WAITING ON PATIENTS INS - STILL PENDING.    PLEASE CALL [place] - [phone]

## 2025-01-20 ENCOUNTER — TELEPHONE (OUTPATIENT)
Dept: INTERNAL MEDICINE | Facility: CLINIC | Age: 60
End: 2025-01-20
Payer: COMMERCIAL

## 2025-01-20 NOTE — TELEPHONE ENCOUNTER
It appears insurance only wants a treadmill stress test where ultrasound pictures are not involved. Unless there is a contraindication

## 2025-01-20 NOTE — TELEPHONE ENCOUNTER
[facility] [phone]    INS DENIED AND WANTED TO KNOW WHY CAN'T THE PATIENT DO A WALKING STRESS TEST? NEED TO RESCHEDULE 01/22?      [facility] PUT THE PEER TO PEER INFO IN THE CHART.    PLEASE ADVISE

## 2025-01-21 NOTE — TELEPHONE ENCOUNTER
If it turns out she can not do it, we can do a peer to peer. Information for that is in her chart .

## 2025-01-23 DIAGNOSIS — R07.89 CHEST PAIN, ATYPICAL: Primary | ICD-10-CM

## 2025-01-23 DIAGNOSIS — E11.9 CONTROLLED TYPE 2 DIABETES MELLITUS WITHOUT COMPLICATION, WITHOUT LONG-TERM CURRENT USE OF INSULIN: ICD-10-CM

## 2025-01-23 DIAGNOSIS — E78.5 DYSLIPIDEMIA (HIGH LDL; LOW HDL): ICD-10-CM

## 2025-01-23 DIAGNOSIS — M79.602 LEFT ARM PAIN: ICD-10-CM

## 2025-02-03 ENCOUNTER — HOSPITAL ENCOUNTER (OUTPATIENT)
Dept: CARDIOLOGY | Facility: HOSPITAL | Age: 60
Discharge: HOME | End: 2025-02-03
Admitting: NURSE PRACTITIONER
Payer: COMMERCIAL

## 2025-02-03 VITALS
BODY MASS INDEX: 36.8 KG/M2 | DIASTOLIC BLOOD PRESSURE: 69 MMHG | WEIGHT: 200 LBS | SYSTOLIC BLOOD PRESSURE: 169 MMHG | HEART RATE: 66 BPM | HEIGHT: 62 IN

## 2025-02-03 DIAGNOSIS — E11.9 CONTROLLED TYPE 2 DIABETES MELLITUS WITHOUT COMPLICATION, WITHOUT LONG-TERM CURRENT USE OF INSULIN: ICD-10-CM

## 2025-02-03 DIAGNOSIS — R07.89 CHEST PAIN, ATYPICAL: ICD-10-CM

## 2025-02-03 DIAGNOSIS — M79.602 LEFT ARM PAIN: ICD-10-CM

## 2025-02-03 DIAGNOSIS — E78.5 DYSLIPIDEMIA (HIGH LDL; LOW HDL): ICD-10-CM

## 2025-02-03 PROCEDURE — 93017 CV STRESS TEST TRACING ONLY: CPT

## 2025-02-04 DIAGNOSIS — R94.39 ABNORMAL STRESS TEST: ICD-10-CM

## 2025-02-04 DIAGNOSIS — R07.89 CHEST PAIN, ATYPICAL: Primary | ICD-10-CM

## 2025-02-04 LAB
BH CV STRESS BP STAGE 1: NORMAL
BH CV STRESS BP STAGE 2: NORMAL
BH CV STRESS DURATION MIN STAGE 1: 3
BH CV STRESS DURATION MIN STAGE 2: 2
BH CV STRESS DURATION SEC STAGE 1: 0
BH CV STRESS DURATION SEC STAGE 2: 50
BH CV STRESS GRADE STAGE 1: 10
BH CV STRESS GRADE STAGE 2: 12
BH CV STRESS HR STAGE 1: 105
BH CV STRESS HR STAGE 2: 113
BH CV STRESS METS STAGE 1: 5
BH CV STRESS METS STAGE 2: 7.5
BH CV STRESS PROTOCOL 1: NORMAL
BH CV STRESS RECOVERY BP: NORMAL MMHG
BH CV STRESS RECOVERY HR: 79 BPM
BH CV STRESS SPEED STAGE 1: 1.7
BH CV STRESS SPEED STAGE 2: 2.5
BH CV STRESS STAGE 1: 1
BH CV STRESS STAGE 2: 2
MAXIMAL PREDICTED HEART RATE: 159 BPM
PERCENT MAX PREDICTED HR: 71.07 %
STRESS BASELINE BP: NORMAL MMHG
STRESS BASELINE HR: 64 BPM
STRESS PERCENT HR: 84 %
STRESS POST ESTIMATED WORKLOAD: 7.5 METS
STRESS POST EXERCISE DUR MIN: 5 MIN
STRESS POST EXERCISE DUR SEC: 50 SEC
STRESS POST PEAK BP: NORMAL MMHG
STRESS POST PEAK HR: 113 BPM
STRESS TARGET HR: 135 BPM

## 2025-02-25 DIAGNOSIS — E78.5 DYSLIPIDEMIA (HIGH LDL; LOW HDL): ICD-10-CM

## 2025-02-25 RX ORDER — ATORVASTATIN CALCIUM 20 MG/1
TABLET, FILM COATED ORAL
Qty: 90 TABLET | Refills: 2 | Status: SHIPPED | OUTPATIENT
Start: 2025-02-25

## 2025-02-25 NOTE — TELEPHONE ENCOUNTER
Rx Refill Note  Requested Prescriptions     Pending Prescriptions Disp Refills    atorvastatin (LIPITOR) 20 MG tablet [Pharmacy Med Name: Atorvastatin Calcium 20 MG Oral Tablet] 90 tablet 0     Sig: Take 1 tablet by mouth once daily      Last office visit with prescribing clinician: 1/3/2025   Last telemedicine visit with prescribing clinician: Visit date not found   Next office visit with prescribing clinician: 3/18/2025                         Would you like a call back once the refill request has been completed: [] Yes [] No    If the office needs to give you a call back, can they leave a voicemail: [] Yes [] No    Rubina Cash RN  02/25/25, 07:02 CST

## 2025-03-11 ENCOUNTER — TELEPHONE (OUTPATIENT)
Dept: INTERNAL MEDICINE | Facility: CLINIC | Age: 60
End: 2025-03-11
Payer: COMMERCIAL

## 2025-04-08 ENCOUNTER — HOSPITAL ENCOUNTER (OUTPATIENT)
Dept: CARDIOLOGY | Facility: HOSPITAL | Age: 60
Discharge: HOME | End: 2025-04-08
Payer: COMMERCIAL

## 2025-04-08 VITALS
DIASTOLIC BLOOD PRESSURE: 74 MMHG | SYSTOLIC BLOOD PRESSURE: 149 MMHG | HEART RATE: 70 BPM | HEIGHT: 62 IN | BODY MASS INDEX: 36.8 KG/M2 | WEIGHT: 200 LBS

## 2025-04-08 DIAGNOSIS — R07.89 CHEST PAIN, ATYPICAL: ICD-10-CM

## 2025-04-08 DIAGNOSIS — R94.39 ABNORMAL STRESS TEST: ICD-10-CM

## 2025-04-08 LAB
BH CV REST NUCLEAR ISOTOPE DOSE: 11.5 MCI
BH CV STRESS BP STAGE 1: NORMAL
BH CV STRESS COMMENTS STAGE 1: NORMAL
BH CV STRESS DOSE REGADENOSON STAGE 1: 0.4
BH CV STRESS DURATION MIN STAGE 1: 0
BH CV STRESS DURATION SEC STAGE 1: 10
BH CV STRESS HR STAGE 1: 93
BH CV STRESS NUCLEAR ISOTOPE DOSE: 35.8 MCI
BH CV STRESS PROTOCOL 1: NORMAL
BH CV STRESS RECOVERY BP: NORMAL MMHG
BH CV STRESS RECOVERY HR: 82 BPM
BH CV STRESS STAGE 1: 1
MAXIMAL PREDICTED HEART RATE: 159 BPM
PERCENT MAX PREDICTED HR: 58.49 %
SPECT HRT GATED+EF W RNC IV: 76 %
STRESS BASELINE BP: NORMAL MMHG
STRESS BASELINE HR: 70 BPM
STRESS PERCENT HR: 69 %
STRESS POST EXERCISE DUR SEC: 10 SEC
STRESS POST PEAK BP: NORMAL MMHG
STRESS POST PEAK HR: 93 BPM
STRESS TARGET HR: 135 BPM

## 2025-04-08 PROCEDURE — A9502 TC99M TETROFOSMIN: HCPCS | Performed by: NURSE PRACTITIONER

## 2025-04-08 PROCEDURE — 93017 CV STRESS TEST TRACING ONLY: CPT

## 2025-04-08 PROCEDURE — 25010000002 REGADENOSON 0.4 MG/5ML SOLUTION: Performed by: HOSPITALIST

## 2025-04-08 PROCEDURE — 93016 CV STRESS TEST SUPVJ ONLY: CPT | Performed by: HOSPITALIST

## 2025-04-08 PROCEDURE — 34310000005 TECHNETIUM TETROFOSMIN KIT: Performed by: NURSE PRACTITIONER

## 2025-04-08 PROCEDURE — 93018 CV STRESS TEST I&R ONLY: CPT | Performed by: HOSPITALIST

## 2025-04-08 PROCEDURE — 78452 HT MUSCLE IMAGE SPECT MULT: CPT

## 2025-04-08 PROCEDURE — 78452 HT MUSCLE IMAGE SPECT MULT: CPT | Performed by: HOSPITALIST

## 2025-04-08 RX ORDER — REGADENOSON 0.08 MG/ML
0.4 INJECTION, SOLUTION INTRAVENOUS ONCE
Status: COMPLETED | OUTPATIENT
Start: 2025-04-08 | End: 2025-04-08

## 2025-04-08 RX ADMIN — TETROFOSMIN 1 DOSE: 1.38 INJECTION, POWDER, LYOPHILIZED, FOR SOLUTION INTRAVENOUS at 09:05

## 2025-04-08 RX ADMIN — REGADENOSON 0.4 MG: 0.08 INJECTION, SOLUTION INTRAVENOUS at 09:00

## 2025-04-08 RX ADMIN — TETROFOSMIN 1 DOSE: 1.38 INJECTION, POWDER, LYOPHILIZED, FOR SOLUTION INTRAVENOUS at 08:00

## 2025-04-15 ENCOUNTER — OFFICE VISIT (OUTPATIENT)
Dept: INTERNAL MEDICINE | Facility: CLINIC | Age: 60
End: 2025-04-15
Payer: COMMERCIAL

## 2025-04-15 VITALS
TEMPERATURE: 97.6 F | DIASTOLIC BLOOD PRESSURE: 83 MMHG | RESPIRATION RATE: 18 BRPM | OXYGEN SATURATION: 95 % | HEIGHT: 62 IN | WEIGHT: 214 LBS | BODY MASS INDEX: 39.38 KG/M2 | SYSTOLIC BLOOD PRESSURE: 133 MMHG | HEART RATE: 69 BPM

## 2025-04-15 DIAGNOSIS — E11.9 TYPE 2 DIABETES MELLITUS WITHOUT COMPLICATION, WITHOUT LONG-TERM CURRENT USE OF INSULIN: Primary | ICD-10-CM

## 2025-04-15 DIAGNOSIS — E78.2 MIXED HYPERLIPIDEMIA: ICD-10-CM

## 2025-04-15 DIAGNOSIS — E66.812 OBESITY, CLASS II, BMI 35-39.9: ICD-10-CM

## 2025-04-15 DIAGNOSIS — I10 PRIMARY HYPERTENSION: ICD-10-CM

## 2025-04-15 RX ORDER — TOPIRAMATE 25 MG/1
12.5 TABLET, FILM COATED ORAL 2 TIMES DAILY
Qty: 60 TABLET | Refills: 1 | Status: SHIPPED | OUTPATIENT
Start: 2025-04-15

## 2025-04-15 NOTE — PROGRESS NOTES
Answers submitted by the patient for this visit:  Problem not listed (Submitted on 4/10/2025)  Chief Complaint: Other medical problem  Reason for appointment: Check up  abdominal pain: No  anorexia: No  joint pain: No  change in stool: No  chest pain: No  chills: Yes  nasal congestion: No  cough: No  diaphoresis: No  fatigue: Yes  fever: No  headaches: No  joint swelling: No  myalgias: No  nausea: No  neck pain: No  numbness: No  rash: No  sore throat: No  swollen glands: No  dysuria: No  vertigo: No  visual change: No  vomiting: No  weakness: No  Onset: at an unknown time  Chronicity: new  Frequency: daily  Medications tried: None          Subjective     Chief Complaint   Patient presents with    Diabetes       History of Present Illness  The patient is a 59-year-old female who presents for evaluation of diabetes and weight management.    A persistent increase in weight is reported since discontinuing Ozempic, which is attributed to the insurance company's refusal to cover the medication. Transition to Medicare is not expected for another four years, and Medicaid qualification is not met due to income exceeding the threshold by $200. Previous success was noted with an unspecified weight loss medication, but Topamax has never been used. The diet includes a high intake of carbonated beverages. A bicycle has been recently acquired for physical activity.    No chest pain or respiratory distress is reported. Episodes of dizziness are denied. Concern is expressed about constant fatigue, believed to be related to weight gain.    FAMILY HISTORY  Her father's side of the family is big and heavy.    Otherwise complete ROS reviewed and negative except as mentioned in the HPI.    Past Medical History:   Past Medical History:   Diagnosis Date    Anxiety     Depression     Diabetes mellitus     Hyperlipidemia     Hypertension     Hyperthyroidism     Hypothyroidism 2010 2019 had right side removed    Obesity     Parkinson's  disease     Pneumonia 1991, 2002, 2006    The are the years I had pneumonia    Tremor     Type 2 diabetes mellitus 2010    Vasovagal syncope      Past Surgical History:  Past Surgical History:   Procedure Laterality Date    CARPAL TUNNEL RELEASE Right 06/28/2022    Procedure: RIGHT CARPAL TUNNEL RELEASE;  Surgeon: Zeyad Gilbert MD;  Location:  [facility];  Service: Orthopedics;  Laterality: Right;    CARPAL TUNNEL RELEASE Left 8/2/2022    Procedure: LEFT CARPAL TUNNEL RELEASE;  Surgeon: Zeyad Gilbert MD;  Location:  [facility];  Service: Orthopedics;  Laterality: Left;    FOOT SURGERY Bilateral     HAMMERTOE AND BUNION REPAIR; 2017 & 2019    HYSTERECTOMY      THYROIDECTOMY, PARTIAL Right      Social History:  reports that she has never smoked. She has never used smokeless tobacco. She reports that she does not currently use alcohol. She reports that she does not use drugs.    Family History: family history includes Anemia in her brother; Cancer in her sister and sister; Diabetes in her brother, maternal grandmother, sister, and sister; Heart disease in her brother, brother, father, mother, and sister; Hypertension in her brother, sister, and sister; Obesity in her maternal grandmother and sister; Pulmonary embolism in her mother; Thyroid disease in her mother; Ulcerative colitis in her brother.       Allergies:  Allergies   Allergen Reactions    Metformin And Related Nausea And Vomiting    Codeine Rash    Morphine Rash    Propranolol Hcl GI Intolerance     Medications:  Prior to Admission medications    Medication Sig Start Date End Date Taking? Authorizing Provider   aspirin 81 MG EC tablet Take 1 tablet by mouth.    Provider, MD Paulina   atorvastatin (LIPITOR) 20 MG tablet Take 1 tablet by mouth once daily 2/25/25   Mary Matson APRN   escitalopram (LEXAPRO) 20 MG tablet Take 1 tablet by mouth once daily 12/12/24   Mary Matson APRN   fluconazole (Diflucan) 200 MG tablet  "Take 1 tablet by mouth Daily. 12/2/24   Mary Matson APRN   levothyroxine (Levoxyl) 175 MCG tablet Take 1 tablet by mouth Every Morning. 1/5/25   Mary Matson APRN   losartan-hydrochlorothiazide (HYZAAR) 50-12.5 MG per tablet Take 1 tablet by mouth Daily. 10/14/24   Mary Matson APRN   meclizine (ANTIVERT) 25 MG tablet Take 1 tablet by mouth 3 (Three) Times a Day As Needed for Dizziness. 7/18/22   Mary Matson APRN   metoprolol tartrate (LOPRESSOR) 25 MG tablet Take 1 tablet by mouth 2 (Two) Times a Day. 9/18/23   Mary Matson APRN   risedronate (ACTONEL) 5 MG tablet Take 1 tablet by mouth Every Morning Before Breakfast. with water on empty stomach, nothing by mouth or lie down for next 30 minutes. 11/25/24   Mary Matson APRN   SITagliptin (Januvia) 50 MG tablet Take 1 tablet by mouth Daily. 12/30/24   Mary Matson APRN   amoxicillin-clavulanate (AUGMENTIN) 875-125 MG per tablet Take 1 tablet by mouth 2 (Two) Times a Day. 10/17/24 4/15/25  Mary Matson APRN   Blood Glucose Monitoring Suppl (FreeStyle Lite) w/Device kit See Admin Instructions. for testing 11/8/22 4/15/25  Provider, MD Paulina   empagliflozin (Jardiance) 10 MG tablet tablet Take 1 tablet by mouth once daily 12/23/24 4/15/25  Mary Matson APRN   glucose blood test strip To test bid 11/8/22 4/15/25  Mary Matson APRN   glucose monitor monitoring kit Use 1 each Continuous. 10/14/24 4/15/25  Mary Matson APRN   Lancets misc 1 each 2 (Two) Times a Day. 11/8/22 4/15/25  Mary Matson APRN       Objective     Vital Signs: /83   Pulse 69   Temp 97.6 °F (36.4 °C)   Resp 18   Ht 157.5 cm (62\")   Wt 97.1 kg (214 lb)   SpO2 95%   BMI 39.14 kg/m²     Physical Exam  Cardiovascular: Regular rate and rhythm, no murmurs, rubs, or gallops  Physical Exam  Vitals reviewed.   Constitutional:       Appearance: She " is well-developed. She is obese.   HENT:      Head: Normocephalic and atraumatic.   Eyes:      Pupils: Pupils are equal, round, and reactive to light.   Neck:      Vascular: No JVD.   Cardiovascular:      Rate and Rhythm: Normal rate and regular rhythm.   Pulmonary:      Effort: Pulmonary effort is normal.      Breath sounds: Normal breath sounds.   Abdominal:      General: Bowel sounds are normal.      Palpations: Abdomen is soft.   Musculoskeletal:         General: No deformity. Normal range of motion.      Cervical back: Normal range of motion and neck supple.   Lymphadenopathy:      Cervical: No cervical adenopathy.   Skin:     General: Skin is warm and dry.   Neurological:      General: No focal deficit present.      Mental Status: She is alert and oriented to person, place, and time.   Psychiatric:         Behavior: Behavior normal.         Thought Content: Thought content normal.         Judgment: Judgment normal.     Results Reviewed:  Glucose   Date Value Ref Range Status   01/03/2025 94 70 - 99 mg/dL Final   11/14/2022 99 65 - 99 mg/dL Final   10/15/2021 121 (H) 74 - 109 mg/dL Final     BUN   Date Value Ref Range Status   01/03/2025 12 8 - 27 mg/dL Final   11/14/2022 9 6 - 20 mg/dL Final   10/15/2021 13 6 - 20 mg/dL Final     Creatinine   Date Value Ref Range Status   01/03/2025 1.06 (H) 0.57 - 1.00 mg/dL Final   11/14/2022 0.98 0.57 - 1.00 mg/dL Final   10/15/2021 0.8 0.5 - 0.9 mg/dL Final     Sodium   Date Value Ref Range Status   01/03/2025 139 134 - 144 mmol/L Final   11/14/2022 141 136 - 145 mmol/L Final   10/15/2021 138 136 - 145 mmol/L Final     Potassium   Date Value Ref Range Status   01/03/2025 4.2 3.5 - 5.2 mmol/L Final   11/14/2022 3.9 3.5 - 5.2 mmol/L Final   10/15/2021 4.1 3.5 - 5.0 mmol/L Final     Chloride   Date Value Ref Range Status   01/03/2025 98 96 - 106 mmol/L Final   11/14/2022 102 98 - 107 mmol/L Final   10/15/2021 100 98 - 111 mmol/L Final     CO2   Date Value Ref Range Status    11/14/2022 32.0 (H) 22.0 - 29.0 mmol/L Final   10/15/2021 28 22 - 29 mmol/L Final     Total CO2   Date Value Ref Range Status   01/03/2025 26 20 - 29 mmol/L Final     Calcium   Date Value Ref Range Status   01/03/2025 9.1 8.7 - 10.3 mg/dL Final   11/14/2022 9.1 8.6 - 10.5 mg/dL Final   10/15/2021 9.3 8.6 - 10.0 mg/dL Final     ALT (SGPT)   Date Value Ref Range Status   01/03/2025 24 0 - 32 IU/L Final   11/14/2022 30 1 - 33 U/L Final   10/15/2021 31 5 - 33 U/L Final     AST (SGOT)   Date Value Ref Range Status   01/03/2025 27 0 - 40 IU/L Final   11/14/2022 28 1 - 32 U/L Final   10/15/2021 25 5 - 32 U/L Final     WBC   Date Value Ref Range Status   01/03/2025 8.7 3.4 - 10.8 x10E3/uL Final   10/15/2021 5.8 4.8 - 10.8 K/uL Final     Hematocrit   Date Value Ref Range Status   01/03/2025 43.3 34.0 - 46.6 % Final   11/14/2022 41.1 34.0 - 46.6 % Final   10/15/2021 43.2 37.0 - 47.0 % Final     Platelets   Date Value Ref Range Status   01/03/2025 300 150 - 450 x10E3/uL Final   11/14/2022 260 140 - 450 10*3/mm3 Final   10/15/2021 274 130 - 400 K/uL Final     Triglycerides   Date Value Ref Range Status   09/06/2024 53 0 - 149 mg/dL Final   10/15/2021 134 0 - 149 mg/dL Final     HDL Cholesterol   Date Value Ref Range Status   09/06/2024 61 >39 mg/dL Final   10/15/2021 41 (L) 65 - 121 mg/dL Final     Comment:     VALUES>60 MG/DL ARE ASSOCIATED WITH A DECREASED RISK OF  ATHEROSCLEROTIC CARDIOVASCULAR DISEASE     LDL Cholesterol    Date Value Ref Range Status   10/15/2021 95 <100 mg/dL Final     Comment:     <100 MG/DL=OPITIMAL    100-129 MG/DL=DESIRABLE    130-159 MG/DL BORDERLINE=INCREASED RISK OF ATHEROSCLEROTIC  CARDIOVASCULAR DISEASE    > OR = 160 MG/DL=ASSOCIATED WITH AN INCREASE RISK OF  ATHEROSCLEROTIC CARDIOVASCULAR DISEASE     LDL Chol Calc ([facility])   Date Value Ref Range Status   09/06/2024 92 0 - 99 mg/dL Final     Hemoglobin A1C   Date Value Ref Range Status   01/03/2025 6.5 (H) 4.8 - 5.6 % Final     Comment:               Prediabetes: 5.7 - 6.4           Diabetes: >6.4           Glycemic control for adults with diabetes: <7.0     11/14/2022 6.10 (H) 4.80 - 5.60 % Final   10/15/2021 6.2 (H) 4.0 - 6.0 % Final     Comment:     HbA1c levels >6% are an indication of hyperglycemia during the preceding 2  to 3 months or longer.    HbA1c levels may reach 20% or higher in poorly controlled diabetes.  Therapeutic action is suggested at levels above 8%.    Diabetes patients with HbA1c levels below 7% meet the goal of the American  Diabetes Association.    HbA1c levels below the established reference range may indicate recent  episodes of hypoglycemia, the presence of Hb variants, or shortened lifetime  of erythrocytes.        Results  Labs   - A1c: 6.5%      Assessment / Plan     Assessment/Plan:  Diagnoses and all orders for this visit:    1. Type 2 diabetes mellitus without complication, without long-term current use of insulin (Primary)  -     Microalbumin / Creatinine Urine Ratio - Urine, Clean Catch  -     Comprehensive Metabolic Panel  -     Hemoglobin A1c    2. Primary hypertension  -     Comprehensive Metabolic Panel    3. Mixed hyperlipidemia  -     Comprehensive Metabolic Panel  -     Lipid Panel    4. Obesity, Class II, BMI 35-39.9  -     topiramate (Topamax) 25 MG tablet; Take 0.5 tablets by mouth 2 (Two) Times a Day.  Dispense: 60 tablet; Refill: 1        Assessment & Plan  1. Diabetes Mellitus.  - A1c level has increased to 6.5, which is above the target of under 6.4.  - Blood work will be conducted today to reassess A1c and cholesterol levels. A urine sample will be collected for microalbumin testing.  - Discussion on the importance of maintaining A1c levels and monitoring diabetes progression.  - Plan includes conducting blood work and urine test today to monitor diabetes and reassess in 3 months.    2. Weight Management.  - Weight has increased since discontinuing Ozempic.  - Topamax has been discussed as an alternative for  weight reduction, with potential side effects including numbness, tingling, and cognitive impairment.  - Counseling provided on maintaining a healthy diet, focusing on fresh vegetables, lean meats, and avoiding processed foods.  - Prescription for Topamax 12.5 mg, to be taken as half a tablet twice daily, has been provided. Discontinue if side effects occur and inform the office.    Follow-up  The patient will follow up in 3 months.      Return in about 3 months (around 7/15/2025). unless patient needs to be seen sooner or acute issues arise.    Code Status: Full  Patient or patient representative verbalized consent for the use of Ambient Listening during the visit with  JAKY Diaz for chart documentation. 4/15/2025  08:47 CDT  I have discussed the patient results/orders and and plan/recommendation with them at today's visit.      Signed by:    JAKY Diaz Date: 04/15/25

## 2025-04-16 LAB
ALBUMIN SERPL-MCNC: 4.2 G/DL (ref 3.9–4.9)
ALBUMIN/CREAT UR: 7 MG/G CREAT (ref 0–29)
ALP SERPL-CCNC: 66 IU/L (ref 44–121)
ALT SERPL-CCNC: 29 IU/L (ref 0–32)
AST SERPL-CCNC: 24 IU/L (ref 0–40)
BILIRUB SERPL-MCNC: 0.4 MG/DL (ref 0–1.2)
BUN SERPL-MCNC: 12 MG/DL (ref 8–27)
BUN/CREAT SERPL: 14 (ref 12–28)
CALCIUM SERPL-MCNC: 9.1 MG/DL (ref 8.7–10.3)
CHLORIDE SERPL-SCNC: 100 MMOL/L (ref 96–106)
CHOLEST SERPL-MCNC: 151 MG/DL (ref 100–199)
CO2 SERPL-SCNC: 27 MMOL/L (ref 20–29)
CREAT SERPL-MCNC: 0.87 MG/DL (ref 0.57–1)
CREAT UR-MCNC: 158.9 MG/DL
EGFRCR SERPLBLD CKD-EPI 2021: 76 ML/MIN/1.73
GLOBULIN SER CALC-MCNC: 2.9 G/DL (ref 1.5–4.5)
GLUCOSE SERPL-MCNC: 134 MG/DL (ref 70–99)
HBA1C MFR BLD: 7 % (ref 4.8–5.6)
HDLC SERPL-MCNC: 40 MG/DL
LDLC SERPL CALC-MCNC: 90 MG/DL (ref 0–99)
MICROALBUMIN UR-MCNC: 11 UG/ML
POTASSIUM SERPL-SCNC: 4.4 MMOL/L (ref 3.5–5.2)
PROT SERPL-MCNC: 7.1 G/DL (ref 6–8.5)
SODIUM SERPL-SCNC: 141 MMOL/L (ref 134–144)
TRIGL SERPL-MCNC: 118 MG/DL (ref 0–149)
VLDLC SERPL CALC-MCNC: 21 MG/DL (ref 5–40)

## 2025-06-02 DIAGNOSIS — M85.88 OSTEOPENIA OF LUMBAR SPINE: ICD-10-CM

## 2025-06-02 RX ORDER — RISEDRONATE SODIUM 5 MG/1
5 TABLET, FILM COATED ORAL
Qty: 30 TABLET | Refills: 6 | Status: SHIPPED | OUTPATIENT
Start: 2025-06-02

## 2025-06-26 DIAGNOSIS — E11.9 CONTROLLED TYPE 2 DIABETES MELLITUS WITHOUT COMPLICATION, WITHOUT LONG-TERM CURRENT USE OF INSULIN: ICD-10-CM

## 2025-06-26 RX ORDER — SITAGLIPTIN 50 MG/1
50 TABLET, FILM COATED ORAL DAILY
Qty: 90 TABLET | Refills: 3 | Status: SHIPPED | OUTPATIENT
Start: 2025-06-26

## 2025-07-16 ENCOUNTER — TELEPHONE (OUTPATIENT)
Dept: INTERNAL MEDICINE | Facility: CLINIC | Age: 60
End: 2025-07-16
Payer: COMMERCIAL

## (undated) DEVICE — TRAP FLD MINIVAC MEGADYNE 100ML

## (undated) DEVICE — 3M™ STERI-STRIP™ REINFORCED ADHESIVE SKIN CLOSURES, R1547, 1/2 IN X 4 IN (12 MM X 100 MM), 6 STRIPS/ENVELOPE: Brand: 3M™ STERI-STRIP™

## (undated) DEVICE — PK EXTRM 30

## (undated) DEVICE — BAPTIST TURNOVER KIT: Brand: MEDLINE INDUSTRIES, INC.

## (undated) DEVICE — GLV SURG DERMASSURE GRN LF PF 8.0

## (undated) DEVICE — GLV SURG SENSICARE PI ORTHO SZ8 LF STRL

## (undated) DEVICE — SUT ETHLN 3/0 FS1 30IN 669H

## (undated) DEVICE — TBG PENCL TELESCP MEGADYNE SMOKE EVAC 10FT

## (undated) DEVICE — BNDG ELAS ECON W/CLIP 3IN 5YD LF STRL

## (undated) DEVICE — DISPOSABLE TOURNIQUET CUFF SINGLE BLADDER, SINGLE PORT AND QUICK CONNECT CONNECTOR: Brand: COLOR CUFF

## (undated) DEVICE — ANTIBACTERIAL UNDYED BRAIDED (POLYGLACTIN 910), SYNTHETIC ABSORBABLE SUTURE: Brand: COATED VICRYL

## (undated) DEVICE — 4-PORT MANIFOLD: Brand: NEPTUNE 2

## (undated) DEVICE — SPNG GZ WOVN 4X4IN 12PLY 10/BX STRL

## (undated) DEVICE — UNDERCAST PADDING: Brand: DEROYAL